# Patient Record
Sex: MALE | Race: WHITE | NOT HISPANIC OR LATINO | Employment: OTHER | URBAN - METROPOLITAN AREA
[De-identification: names, ages, dates, MRNs, and addresses within clinical notes are randomized per-mention and may not be internally consistent; named-entity substitution may affect disease eponyms.]

---

## 2017-11-24 ENCOUNTER — OFFICE VISIT - HEALTHEAST (OUTPATIENT)
Dept: FAMILY MEDICINE | Facility: CLINIC | Age: 72
End: 2017-11-24

## 2017-11-24 DIAGNOSIS — H66.91 RIGHT OTITIS MEDIA, UNSPECIFIED OTITIS MEDIA TYPE: ICD-10-CM

## 2017-11-24 DIAGNOSIS — J02.9 SORE THROAT: ICD-10-CM

## 2017-12-06 ENCOUNTER — RECORDS - HEALTHEAST (OUTPATIENT)
Dept: ADMINISTRATIVE | Facility: OTHER | Age: 72
End: 2017-12-06

## 2018-04-27 ENCOUNTER — OFFICE VISIT - HEALTHEAST (OUTPATIENT)
Dept: FAMILY MEDICINE | Facility: CLINIC | Age: 73
End: 2018-04-27

## 2018-04-27 DIAGNOSIS — K21.9 GASTROESOPHAGEAL REFLUX DISEASE WITHOUT ESOPHAGITIS: ICD-10-CM

## 2018-04-27 DIAGNOSIS — L98.9 NON-HEALING SKIN LESION: ICD-10-CM

## 2018-04-27 DIAGNOSIS — Z13.220 ENCOUNTER FOR SCREENING FOR LIPOID DISORDERS: ICD-10-CM

## 2018-04-27 DIAGNOSIS — Z13.6 SCREENING FOR CARDIOVASCULAR CONDITION: ICD-10-CM

## 2018-04-27 DIAGNOSIS — M10.9 GOUT: ICD-10-CM

## 2018-04-27 DIAGNOSIS — Z00.00 ROUTINE GENERAL MEDICAL EXAMINATION AT A HEALTH CARE FACILITY: ICD-10-CM

## 2018-04-27 DIAGNOSIS — Z12.5 SCREENING FOR MALIGNANT NEOPLASM OF PROSTATE: ICD-10-CM

## 2018-04-27 LAB
ALBUMIN SERPL-MCNC: 3.7 G/DL (ref 3.5–5)
ALP SERPL-CCNC: 84 U/L (ref 45–120)
ALT SERPL W P-5'-P-CCNC: 18 U/L (ref 0–45)
ANION GAP SERPL CALCULATED.3IONS-SCNC: 9 MMOL/L (ref 5–18)
AST SERPL W P-5'-P-CCNC: 24 U/L (ref 0–40)
BILIRUB SERPL-MCNC: 1 MG/DL (ref 0–1)
BUN SERPL-MCNC: 20 MG/DL (ref 8–28)
CALCIUM SERPL-MCNC: 9.8 MG/DL (ref 8.5–10.5)
CHLORIDE BLD-SCNC: 106 MMOL/L (ref 98–107)
CHOLEST SERPL-MCNC: 186 MG/DL
CO2 SERPL-SCNC: 25 MMOL/L (ref 22–31)
CREAT SERPL-MCNC: 1.22 MG/DL (ref 0.7–1.3)
FASTING STATUS PATIENT QL REPORTED: YES
GFR SERPL CREATININE-BSD FRML MDRD: 58 ML/MIN/1.73M2
GLUCOSE BLD-MCNC: 89 MG/DL (ref 70–125)
HDLC SERPL-MCNC: 73 MG/DL
LDLC SERPL CALC-MCNC: 100 MG/DL
POTASSIUM BLD-SCNC: 4.7 MMOL/L (ref 3.5–5)
PROT SERPL-MCNC: 6.5 G/DL (ref 6–8)
PSA SERPL-MCNC: 3.6 NG/ML (ref 0–6.5)
SODIUM SERPL-SCNC: 140 MMOL/L (ref 136–145)
TRIGL SERPL-MCNC: 66 MG/DL

## 2018-04-27 ASSESSMENT — MIFFLIN-ST. JEOR: SCORE: 1380.8

## 2018-05-01 ENCOUNTER — COMMUNICATION - HEALTHEAST (OUTPATIENT)
Dept: FAMILY MEDICINE | Facility: CLINIC | Age: 73
End: 2018-05-01

## 2018-05-01 ENCOUNTER — HOSPITAL ENCOUNTER (OUTPATIENT)
Dept: CT IMAGING | Facility: CLINIC | Age: 73
Discharge: HOME | End: 2018-05-01
Attending: FAMILY MEDICINE

## 2018-05-01 DIAGNOSIS — R93.89 ABNORMAL CT OF THE CHEST: ICD-10-CM

## 2018-05-01 DIAGNOSIS — Z13.6 SCREENING FOR CARDIOVASCULAR CONDITION: ICD-10-CM

## 2018-05-01 LAB
CV CALCIUM SCORE AGATSTON LM: 0
CV CALCIUM SCORING AGATSON LAD: 0
CV CALCIUM SCORING AGATSTON CX: 0
CV CALCIUM SCORING AGATSTON RCA: 0
CV CALCIUM SCORING AGATSTON TOTAL: 0

## 2018-05-02 ENCOUNTER — RECORDS - HEALTHEAST (OUTPATIENT)
Dept: ADMINISTRATIVE | Facility: OTHER | Age: 73
End: 2018-05-02

## 2018-05-03 ENCOUNTER — RECORDS - HEALTHEAST (OUTPATIENT)
Dept: ADMINISTRATIVE | Facility: OTHER | Age: 73
End: 2018-05-03

## 2018-05-07 ENCOUNTER — HOSPITAL ENCOUNTER (OUTPATIENT)
Dept: CT IMAGING | Facility: CLINIC | Age: 73
Discharge: HOME | End: 2018-05-07
Attending: FAMILY MEDICINE

## 2018-05-07 DIAGNOSIS — R93.89 ABNORMAL CT OF THE CHEST: ICD-10-CM

## 2018-05-08 ENCOUNTER — RECORDS - HEALTHEAST (OUTPATIENT)
Dept: ADMINISTRATIVE | Facility: OTHER | Age: 73
End: 2018-05-08

## 2018-06-05 ENCOUNTER — COMMUNICATION - HEALTHEAST (OUTPATIENT)
Dept: SCHEDULING | Facility: CLINIC | Age: 73
End: 2018-06-05

## 2018-06-05 ENCOUNTER — OFFICE VISIT - HEALTHEAST (OUTPATIENT)
Dept: FAMILY MEDICINE | Facility: CLINIC | Age: 73
End: 2018-06-05

## 2018-06-05 DIAGNOSIS — C44.311 BASAL CELL CARCINOMA OF ALA NASI: ICD-10-CM

## 2018-06-05 DIAGNOSIS — H92.21 EAR BLEEDING, RIGHT: ICD-10-CM

## 2018-06-06 ENCOUNTER — RECORDS - HEALTHEAST (OUTPATIENT)
Dept: ADMINISTRATIVE | Facility: OTHER | Age: 73
End: 2018-06-06

## 2019-02-11 ENCOUNTER — RECORDS - HEALTHEAST (OUTPATIENT)
Dept: GENERAL RADIOLOGY | Facility: CLINIC | Age: 74
End: 2019-02-11

## 2019-02-11 ENCOUNTER — OFFICE VISIT - HEALTHEAST (OUTPATIENT)
Dept: FAMILY MEDICINE | Facility: CLINIC | Age: 74
End: 2019-02-11

## 2019-02-11 DIAGNOSIS — M54.42 LUMBAGO WITH SCIATICA, LEFT SIDE: ICD-10-CM

## 2019-02-11 DIAGNOSIS — M54.42 LEFT-SIDED LOW BACK PAIN WITH LEFT-SIDED SCIATICA, UNSPECIFIED CHRONICITY: ICD-10-CM
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
## 2019-02-11 RX ORDER — OXYCODONE HYDROCHLORIDE 5 MG/1
5 TABLET ORAL
Qty: 13 TABLET | Refills: 0 | Status: SHIPPED | OUTPATIENT
Start: 2019-02-11 | End: 2021-09-10

## 2019-02-12 ENCOUNTER — HOSPITAL ENCOUNTER (OUTPATIENT)
Dept: MRI IMAGING | Facility: CLINIC | Age: 74
Discharge: HOME | End: 2019-02-12
Attending: FAMILY MEDICINE

## 2019-02-12 ENCOUNTER — AMBULATORY - HEALTHEAST (OUTPATIENT)
Dept: FAMILY MEDICINE | Facility: CLINIC | Age: 74
End: 2019-02-12

## 2019-02-12 ENCOUNTER — COMMUNICATION - HEALTHEAST (OUTPATIENT)
Dept: FAMILY MEDICINE | Facility: CLINIC | Age: 74
End: 2019-02-12

## 2019-02-12 DIAGNOSIS — M54.16 LUMBAR RADICULOPATHY, CHRONIC: ICD-10-CM

## 2019-02-12 DIAGNOSIS — M54.42 LEFT-SIDED LOW BACK PAIN WITH LEFT-SIDED SCIATICA, UNSPECIFIED CHRONICITY: ICD-10-CM

## 2019-02-12 DIAGNOSIS — M10.9 GOUT: ICD-10-CM

## 2019-02-13 ENCOUNTER — HOSPITAL ENCOUNTER (OUTPATIENT)
Dept: PHYSICAL MEDICINE AND REHAB | Facility: CLINIC | Age: 74
Discharge: HOME | End: 2019-02-13
Attending: PHYSICAL MEDICINE & REHABILITATION

## 2019-02-13 DIAGNOSIS — M54.16 LUMBAR RADICULITIS: ICD-10-CM

## 2019-02-13 DIAGNOSIS — M79.662 PAIN OF LEFT LOWER LEG: ICD-10-CM

## 2019-02-13 DIAGNOSIS — M54.50 ACUTE LEFT-SIDED LOW BACK PAIN WITHOUT SCIATICA: ICD-10-CM
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
## 2019-02-13 DIAGNOSIS — M41.26 OTHER IDIOPATHIC SCOLIOSIS, LUMBAR REGION: ICD-10-CM

## 2019-02-13 DIAGNOSIS — M53.3 SACROILIAC JOINT PAIN: ICD-10-CM

## 2019-02-13 ASSESSMENT — MIFFLIN-ST. JEOR: SCORE: 1366.29

## 2019-03-06 ENCOUNTER — COMMUNICATION - HEALTHEAST (OUTPATIENT)
Dept: PHYSICAL MEDICINE AND REHAB | Facility: CLINIC | Age: 74
End: 2019-03-06

## 2019-04-03 ENCOUNTER — RECORDS - HEALTHEAST (OUTPATIENT)
Dept: ADMINISTRATIVE | Facility: OTHER | Age: 74
End: 2019-04-03

## 2019-05-10 ENCOUNTER — COMMUNICATION - HEALTHEAST (OUTPATIENT)
Dept: FAMILY MEDICINE | Facility: CLINIC | Age: 74
End: 2019-05-10

## 2019-05-10 DIAGNOSIS — M10.9 GOUT: ICD-10-CM

## 2019-05-13 ENCOUNTER — COMMUNICATION - HEALTHEAST (OUTPATIENT)
Dept: FAMILY MEDICINE | Facility: CLINIC | Age: 74
End: 2019-05-13

## 2019-05-13 DIAGNOSIS — M10.9 GOUT: ICD-10-CM

## 2020-05-05 ENCOUNTER — RECORDS - HEALTHEAST (OUTPATIENT)
Dept: ADMINISTRATIVE | Facility: OTHER | Age: 75
End: 2020-05-05

## 2020-08-18 ENCOUNTER — OFFICE VISIT - HEALTHEAST (OUTPATIENT)
Dept: FAMILY MEDICINE | Facility: CLINIC | Age: 75
End: 2020-08-18

## 2020-08-18 DIAGNOSIS — N52.9 ERECTILE DYSFUNCTION, UNSPECIFIED ERECTILE DYSFUNCTION TYPE: ICD-10-CM

## 2020-08-18 DIAGNOSIS — Z13.220 ENCOUNTER FOR SCREENING FOR LIPOID DISORDERS: ICD-10-CM

## 2020-08-18 DIAGNOSIS — Z00.00 ROUTINE GENERAL MEDICAL EXAMINATION AT A HEALTH CARE FACILITY: ICD-10-CM

## 2020-08-18 DIAGNOSIS — Z23 TETANUS-DIPHTHERIA (TD) VACCINATION: ICD-10-CM

## 2020-08-18 DIAGNOSIS — M10.9 GOUT: ICD-10-CM

## 2020-08-18 DIAGNOSIS — Z12.5 SCREENING FOR MALIGNANT NEOPLASM OF PROSTATE: ICD-10-CM

## 2020-08-18 LAB
ALBUMIN SERPL-MCNC: 4 G/DL (ref 3.5–5)
ALP SERPL-CCNC: 73 U/L (ref 45–120)
ALT SERPL W P-5'-P-CCNC: 19 U/L (ref 0–45)
ANION GAP SERPL CALCULATED.3IONS-SCNC: 10 MMOL/L (ref 5–18)
AST SERPL W P-5'-P-CCNC: 32 U/L (ref 0–40)
BILIRUB SERPL-MCNC: 1 MG/DL (ref 0–1)
BUN SERPL-MCNC: 26 MG/DL (ref 8–28)
CALCIUM SERPL-MCNC: 9.2 MG/DL (ref 8.5–10.5)
CHLORIDE BLD-SCNC: 107 MMOL/L (ref 98–107)
CHOLEST SERPL-MCNC: 177 MG/DL
CO2 SERPL-SCNC: 23 MMOL/L (ref 22–31)
CREAT SERPL-MCNC: 1.2 MG/DL (ref 0.7–1.3)
FASTING STATUS PATIENT QL REPORTED: YES
GFR SERPL CREATININE-BSD FRML MDRD: 59 ML/MIN/1.73M2
GLUCOSE BLD-MCNC: 83 MG/DL (ref 70–125)
HDLC SERPL-MCNC: 69 MG/DL
LDLC SERPL CALC-MCNC: 95 MG/DL
POTASSIUM BLD-SCNC: 4.7 MMOL/L (ref 3.5–5)
PROT SERPL-MCNC: 6.5 G/DL (ref 6–8)
PSA SERPL-MCNC: 4.7 NG/ML (ref 0–6.5)
SODIUM SERPL-SCNC: 140 MMOL/L (ref 136–145)
TRIGL SERPL-MCNC: 65 MG/DL

## 2020-08-18 RX ORDER — ALLOPURINOL 100 MG/1
100 TABLET ORAL DAILY
Qty: 90 TABLET | Refills: 3 | Status: SHIPPED | OUTPATIENT
Start: 2020-08-18 | End: 2021-09-10

## 2020-08-18 RX ORDER — TADALAFIL 10 MG/1
10 TABLET ORAL DAILY PRN
Qty: 30 TABLET | Refills: 0 | Status: SHIPPED | OUTPATIENT
Start: 2020-08-18 | End: 2021-09-10

## 2020-08-18 ASSESSMENT — MIFFLIN-ST. JEOR: SCORE: 1373.55

## 2020-09-01 ENCOUNTER — COMMUNICATION - HEALTHEAST (OUTPATIENT)
Dept: FAMILY MEDICINE | Facility: CLINIC | Age: 75
End: 2020-09-01

## 2020-09-01 DIAGNOSIS — M71.9 DISORDER OF BURSAE AND TENDONS IN SHOULDER REGION: ICD-10-CM

## 2020-09-01 DIAGNOSIS — M67.919 DISORDER OF BURSAE AND TENDONS IN SHOULDER REGION: ICD-10-CM

## 2020-09-21 ENCOUNTER — RECORDS - HEALTHEAST (OUTPATIENT)
Dept: ADMINISTRATIVE | Facility: OTHER | Age: 75
End: 2020-09-21

## 2020-11-02 ENCOUNTER — RECORDS - HEALTHEAST (OUTPATIENT)
Dept: ADMINISTRATIVE | Facility: OTHER | Age: 75
End: 2020-11-02

## 2020-11-29 ENCOUNTER — COMMUNICATION - HEALTHEAST (OUTPATIENT)
Dept: FAMILY MEDICINE | Facility: CLINIC | Age: 75
End: 2020-11-29

## 2021-01-20 ENCOUNTER — RECORDS - HEALTHEAST (OUTPATIENT)
Dept: ADMINISTRATIVE | Facility: OTHER | Age: 76
End: 2021-01-20

## 2021-01-21 ENCOUNTER — COMMUNICATION - HEALTHEAST (OUTPATIENT)
Dept: FAMILY MEDICINE | Facility: CLINIC | Age: 76
End: 2021-01-21

## 2021-02-12 ENCOUNTER — AMBULATORY - HEALTHEAST (OUTPATIENT)
Dept: NURSING | Facility: CLINIC | Age: 76
End: 2021-02-12

## 2021-02-16 ENCOUNTER — COMMUNICATION - HEALTHEAST (OUTPATIENT)
Dept: FAMILY MEDICINE | Facility: CLINIC | Age: 76
End: 2021-02-16

## 2021-03-05 ENCOUNTER — AMBULATORY - HEALTHEAST (OUTPATIENT)
Dept: NURSING | Facility: CLINIC | Age: 76
End: 2021-03-05

## 2021-03-31 ENCOUNTER — AMBULATORY - HEALTHEAST (OUTPATIENT)
Dept: FAMILY MEDICINE | Facility: CLINIC | Age: 76
End: 2021-03-31

## 2021-03-31 DIAGNOSIS — Z23 NEED FOR PNEUMOCOCCAL VACCINATION: ICD-10-CM

## 2021-04-01 ENCOUNTER — AMBULATORY - HEALTHEAST (OUTPATIENT)
Dept: NURSING | Facility: CLINIC | Age: 76
End: 2021-04-01

## 2021-04-01 DIAGNOSIS — Z23 NEED FOR PNEUMOCOCCAL VACCINATION: ICD-10-CM

## 2021-05-26 NOTE — TELEPHONE ENCOUNTER
"Patient returned call. Reports he is doing well. \"I am doing the stretches and exercises for the SI and I am back to normal. Thank her for me. She is the one that figured out it wasn't my back, but the SI joint.\"   "

## 2021-05-27 ENCOUNTER — RECORDS - HEALTHEAST (OUTPATIENT)
Dept: ADMINISTRATIVE | Facility: CLINIC | Age: 76
End: 2021-05-27

## 2021-05-28 NOTE — TELEPHONE ENCOUNTER
RN Med Refill note:    Resent to Rx to new pharmacy [facility] per pt request.    Thea Mancia RN   [facility] RN Triage

## 2021-05-28 NOTE — TELEPHONE ENCOUNTER
Refill Request  Did you contact pharmacy: Yes  Medication name:   Requested Prescriptions     Pending Prescriptions Disp Refills     allopurinol (ZYLOPRIM) 100 MG tablet 90 tablet 3     Sig: Take 1 tablet (100 mg total) by mouth daily.     Who prescribed the medication: Dr Forbes        Pharmacy Name and Location: Changing pharmacy to [facility]    Okay to leave a detailed message: yes

## 2021-05-28 NOTE — TELEPHONE ENCOUNTER
RN cannot approve Refill Request    RN can NOT refill this medication PCP messaged that patient is overdue for Labs.       Elizabeth Borges, [facility] Triage/Med Refill 5/10/2019    Requested Prescriptions   Pending Prescriptions Disp Refills     allopurinol (ZYLOPRIM) 100 MG tablet 90 tablet 0     Sig: Take 1 tablet (100 mg total) by mouth daily.       Allopurinol/Febuxostat Refill Protocol  Failed - 5/10/2019  1:15 PM        Failed - LFT or AST or ALT in last 12 months     Albumin   Date Value Ref Range Status   04/27/2018 3.7 3.5 - 5.0 g/dL Final     Bilirubin, Total   Date Value Ref Range Status   04/27/2018 1.0 0.0 - 1.0 mg/dL Final     Alkaline Phosphatase   Date Value Ref Range Status   04/27/2018 84 45 - 120 U/L Final     AST   Date Value Ref Range Status   04/27/2018 24 0 - 40 U/L Final     ALT   Date Value Ref Range Status   04/27/2018 18 0 - 45 U/L Final     Protein, Total   Date Value Ref Range Status   04/27/2018 6.5 6.0 - 8.0 g/dL Final                Passed - Visit with PCP or prescribing provider visit in past 12 months     Last office visit with prescriber/PCP: 2/11/2019 Joaquín Forbes MD OR same dept: 2/11/2019 Joaquín Forbes MD OR same specialty: 2/11/2019 Joaquín Forbes MD  Last physical: 4/27/2018 Last MTM visit: Visit date not found   Next visit within 3 mo: Visit date not found  Next physical within 3 mo: Visit date not found  Prescriber OR PCP: Joaquín Forbes MD  Last diagnosis associated with med order: 1. Gout  - allopurinol (ZYLOPRIM) 100 MG tablet; Take 1 tablet (100 mg total) by mouth daily.  Dispense: 90 tablet; Refill: 0    If protocol passes may refill for 12 months if within 3 months of last provider visit (or a total of 15 months).

## 2021-05-29 ENCOUNTER — RECORDS - HEALTHEAST (OUTPATIENT)
Dept: ADMINISTRATIVE | Facility: CLINIC | Age: 76
End: 2021-05-29

## 2021-05-30 ENCOUNTER — HEALTH MAINTENANCE LETTER (OUTPATIENT)
Age: 76
End: 2021-05-30

## 2021-05-30 ENCOUNTER — RECORDS - HEALTHEAST (OUTPATIENT)
Dept: ADMINISTRATIVE | Facility: CLINIC | Age: 76
End: 2021-05-30

## 2021-05-31 VITALS — BODY MASS INDEX: 25.77 KG/M2 | WEIGHT: 160.9 LBS

## 2021-06-01 VITALS — WEIGHT: 157.2 LBS | BODY MASS INDEX: 25.26 KG/M2 | HEIGHT: 66 IN

## 2021-06-01 VITALS — WEIGHT: 150.4 LBS | BODY MASS INDEX: 24.28 KG/M2

## 2021-06-02 VITALS — BODY MASS INDEX: 25.69 KG/M2 | WEIGHT: 154 LBS | WEIGHT: 159.19 LBS | HEIGHT: 66 IN | BODY MASS INDEX: 24.75 KG/M2

## 2021-06-04 VITALS — BODY MASS INDEX: 25.01 KG/M2 | OXYGEN SATURATION: 99 % | WEIGHT: 155.6 LBS | HEART RATE: 62 BPM | HEIGHT: 66 IN

## 2021-06-10 NOTE — PATIENT INSTRUCTIONS - HE
Patient Education   Personalized Prevention Plan  You are due for the preventive services outlined below.  Your care team is available to assist you in scheduling these services.  If you have already completed any of these items, please share that information with your care team to update in your medical record.  Health Maintenance   Topic Date Due     ZOSTER VACCINES (2 of 3) 01/26/2016     PNEUMOCOCCAL IMMUNIZATION 65+ LOW/MEDIUM RISK (2 of 2 - PPSV23) 10/06/2016     TD 18+ HE  03/05/2018     MEDICARE ANNUAL WELLNESS VISIT  04/27/2019     FALL RISK ASSESSMENT  04/27/2019     INFLUENZA VACCINE RULE BASED (1) 08/01/2020     LIPID  04/27/2023     ADVANCE CARE PLANNING  04/27/2023     HEPATITIS B VACCINES  Aged Out

## 2021-06-10 NOTE — PROGRESS NOTES
Assessment and Plan:       1. Routine general medical examination at a health care facility  - Comprehensive Metabolic Panel    2. Encounter for screening for lipoid disorders  - Lipid Cascade, RANDOM    3. Screening for malignant neoplasm of prostate  - PSA (Prostatic-Specific Antigen), Annual Screen    4. Tetanus-diphtheria (Td) vaccination  - Td, Preservative Free (green label)    5. Erectile dysfunction, unspecified erectile dysfunction type  - tadalafiL (CIALIS) 10 MG tablet; Take 1 tablet (10 mg total) by mouth daily as needed for erectile dysfunction.  Dispense: 30 tablet; Refill: 0    6. Gout  - allopurinoL (ZYLOPRIM) 100 MG tablet; Take 1 tablet (100 mg total) by mouth daily.  Dispense: 90 tablet; Refill: 3  He continues to be quite active and doing well. Does not really have any major concerns today except for need for medications for rectal dysfunction and he did get the prescriptions.  He does not have any cardiovascular disease problems.  We will also update his tetanus shot and get his labs.  We will also refill his allopurinol.  Have him follow-up as needed and if he has any major concerns.  The patient's current medical problems were reviewed.    I have had an Advance Directives discussion with the patient.  The following health maintenance schedule was reviewed with the patient and provided in printed form in the after visit summary:   Health Maintenance   Topic Date Due     ZOSTER VACCINES (2 of 3) 01/26/2016     PNEUMOCOCCAL IMMUNIZATION 65+ LOW/MEDIUM RISK (2 of 2 - PPSV23) 10/06/2016     TD 18+ HE  03/05/2018     MEDICARE ANNUAL WELLNESS VISIT  04/27/2019     FALL RISK ASSESSMENT  04/27/2019     INFLUENZA VACCINE RULE BASED (1) 08/01/2020     LIPID  04/27/2023     ADVANCE CARE PLANNING  04/27/2023     HEPATITIS B VACCINES  Aged Out        Subjective:   Chief Complaint: Rasheed Hanson is an 75 y.o. male here for an Annual Wellness visit.   HPI: 75 years old gentleman who comes in today for  his annual wellness visit.  He has noted that he has remained quite well and remained active.  He is feeling well and going about his normal activities.  He comes in fasted and will like to have his labs done.  He does request for erectile dysfunction medications noted that he has been having a little bit of difficulty having any erection.  He does not have any cardiovascular disease problems.  He also wants to have a refill of his allopurinol.  He noted that whenever he does not take the allopurinol he will be having some issues with gout.  Noted that he has not had gout recently though.    Review of Systems:    Constitutional:Denied any fatigue no fevers no chills.  Has good appetite.  HEENT: Does not have any neck pain.  No difficulty swallowing denies having any postnasal drips.    Respiratory: There is no cough.  No chest wall pain.  Cardiovascular: Denied chest pain shortness of breath or palpitations.  There is no notable lower extremity swelling.    Gastrointestinal: Denies nausea vomiting.  No abdominal pain no diarrhea or constipation.  Endocrine:Has no sensitivity to cold or heat.  Denied undue thirst.   Genitourinary:Has no urinary symptoms, no nocturia.  Musculoskeletal: There is no musculoskeletal pain and swelling.    Skin: He does not have any rashes.   Allergic/Immunologic: Negative.   Neurological: No Numbness  Hematological: Negative.   Psychiatric/Behavioral: No anxiety or depression symptoms.  Please see above.  The rest of the review of systems are negative for all systems.    Patient Care Team:  Joaquín Forbes MD as PCP - General (Family Medicine)  Joaquín Forbes MD as Assigned PCP     Patient Active Problem List   Diagnosis     Acute Gout     Bronchitis     Sinusitis     Hyperlipidemia     Cervical Spondylosis (C5 - C6)     Left Rotator Cuff Tendonitis     Onychomycosis Of The Toenails     Tinea Pedis     Otitis Externa     Lumbar radiculopathy, chronic      Tendonitis     Past Medical History:   Diagnosis Date     Acute Gout     Created by Conversion      Bronchitis     Created by Conversion      Hyperlipidemia     Created by Conversion      Lumbar Radiculopathy     Created by Conversion      Otitis Externa     Created by Conversion  Replacement Utility updated for latest IMO load     Tendonitis     Created by Conversion  Replacement Utility updated for latest IMO load     Tinea Pedis     Created by Conversion       Past Surgical History:   Procedure Laterality Date     LASIK       ROTATOR CUFF REPAIR Right       Family History   Problem Relation Age of Onset     Cancer Mother         bladder     Hypertension Mother      Prostate cancer Maternal Grandfather      Cancer Father         Brain Cancer     Alcohol abuse Brother          oF MVA     Kidney disease Brother      Arthritis Brother         Back problems      Social History     Socioeconomic History     Marital status:      Spouse name: Not on file     Number of children: Not on file     Years of education: Not on file     Highest education level: Not on file   Occupational History     Not on file   Social Needs     Financial resource strain: Not on file     Food insecurity     Worry: Not on file     Inability: Not on file     Transportation needs     Medical: Not on file     Non-medical: Not on file   Tobacco Use     Smoking status: Never Smoker     Smokeless tobacco: Never Used   Substance and Sexual Activity     Alcohol use: Yes     Alcohol/week: 14.0 standard drinks     Types: 14 Glasses of wine per week     Drug use: No     Sexual activity: Yes     Partners: Female   Lifestyle     Physical activity     Days per week: Not on file     Minutes per session: Not on file     Stress: Not on file   Relationships     Social connections     Talks on phone: Not on file     Gets together: Not on file     Attends Jew service: Not on file     Active member of club or organization: Not on file     Attends  "meetings of clubs or organizations: Not on file     Relationship status: Not on file     Intimate partner violence     Fear of current or ex partner: Not on file     Emotionally abused: Not on file     Physically abused: Not on file     Forced sexual activity: Not on file   Other Topics Concern     Not on file   Social History Narrative     Not on file      Current Outpatient Medications   Medication Sig Dispense Refill     allopurinol (ZYLOPRIM) 100 MG tablet Take 1 tablet (100 mg total) by mouth daily. 90 tablet 3     aspirin (ASPIR-81) 81 MG EC tablet Take 1/2 tablet daily       DOCOSAHEXANOIC ACID/EPA (FISH OIL ORAL) Take as directed       GLUCOSAM HCL/CHONDRO FRIEDMAN A/C/MN (GLUCOSAMINE-CHONDROITIN COMPLX ORAL) Take 1 tablet daily       lactobacillus acidophilus (ACIDOPHILUS) cap Take as directed.       MELATONIN ORAL Take by mouth. As directed       multivitamin (ONE A DAY) per tablet Take 1 tablet by mouth daily.       psyllium (METAMUCIL SUGAR FREE) Powd Take 6 g by mouth daily.  0     SAW PALMETTO ORAL Take by mouth.       zinc 50 mg Tab Take as directed       clotrimazole (LOTRIMIN) 1 % cream Apply topically 2 (two) times a day. Until rash is resolved       diclofenac sodium (VOLTAREN) 1 % Gel Apply to lower extremities, 4 GM of gel to affected area 4 times daily. Do not apply more than 16 GM daily to any one affected joint. 1 Tube 3     oxyCODONE (ROXICODONE) 5 MG immediate release tablet Take 1 tablet (5 mg total) by mouth at bedtime as needed for pain. 13 tablet 0     No current facility-administered medications for this visit.       Objective:   Vital Signs:   Visit Vitals  Pulse 62   Ht 5' 6\" (1.676 m)   Wt 155 lb 9.6 oz (70.6 kg)   SpO2 99%   BMI 25.11 kg/m           VisionScreening:  No exam data present     Physical Exam:  General Appearance: Alert, cooperative, no distress, appears stated age  Head: Normocephalic, without obvious abnormality, atraumatic  Eyes: PERRL, conjunctiva/corneas clear, EOM's " intact  Ears: Normal TM's and external ear canals, both ears  Nose: Nares normal, septum midline,mucosa normal, no drainage  Throat: Lips, mucosa, and tongue normal; teeth and gums normal  Neck: Supple, symmetrical, trachea midline, no adenopathy;  thyroid: not enlarged, symmetric, no tenderness.  Back: Symmetric, no curvature, ROM normal, no CVA tenderness  Lungs: Clear to auscultation bilaterally, respirations unlabored  Heart: Regular rate and rhythm, S1 and S2 normal, no murmur, rub, or gallop,  Abdomen: Soft, non-tender, bowel sounds active all four quadrants,  no masses, no organomegaly  Musculoskeletal: Normal range of motion. No joint swelling or deformity.   Extremities: Extremities normal, atraumatic, no cyanosis or edema  Skin: Skin color, texture, turgor normal, no rashes or lesions  Lymph nodes: Cervical, supraclavicular, and axillary nodes normal  Neurologic: He is alert. He has normal reflexes.   Psychiatric: He has a normal mood and affect.       Assessment Results 8/18/2020   Activities of Daily Living No help needed   Instrumental Activities of Daily Living No help needed   Get Up and Go Score -   Mini Cog Total Score 5   Some recent data might be hidden     A Mini-Cog score of 0-2 suggests the possibility of dementia, score of 3-5 suggests no dementia  He does not have any problems with fall and no memory related issues.      Identified Health Risks:

## 2021-06-11 NOTE — TELEPHONE ENCOUNTER
Orders being requested: physical therapy  Reason service is needed/diagnosis: left shoulder soreness that is not injury related  When are orders needed by: non-urgent  Where to send Orders: Fax: [facility] in [place] fax [phone]  Okay to leave detailed message?  No    Patient stated he spoke to Joaquín Forbes MD about this at his recent physical. Patient stated he has been helping his kids doing home improvement projects and he thinks he overworked his left shoulder. Patient stated it's been sore for the last 2 weeks and is not better. Patient stated he's had surgery on the left shoulder before and would like physical therapy to see if it helps him.

## 2021-06-14 NOTE — PROGRESS NOTES
Assessment/Plan:     1. Right otitis media, unspecified otitis media type  The right ear canal was flushed per provider assist. Patient tolerated well and small amount of bloody drainage was removed.  Azithromycin x 5 days.  I did choose to do some otic drops due to the amount of canal swelling/erythema.    - ofloxacin (FLOXIN) 0.3 % otic solution; Administer 5 drops to the right ear 2 (two) times a day for 7 days.  Dispense: 5 mL; Refill: 0  - azithromycin (ZITHROMAX Z-ISAIAS) 250 MG tablet; Take 2 tablets (500 mg) on  Day 1,  followed by 1 tablet (250 mg) once daily on Days 2 through 5.  Dispense: 6 tablet; Refill: 0    2. Sore throat  Rapid strep negative.  Will notify if culture positive.   - Rapid Strep A Screen-Throat  - Group A Strep, RNA Direct Detection, Throat        Subjective:     Rasheed Hanson is a 72 y.o. male who presents with right ear pain and sore throat.  Ear pain started about 1 week ago while hunting.  Patient believes he got some bark stuck in his ear and has been attempting to remove it throughout the week.  He noticed some bloody drainage from the ear today.  Sore throat started 3 days ago; painful to swallow on right side.  No known strep contacts.  No fever, sinus congestion, runny nose, or cough.  Eating and drinking without problem.  OTC treatments include fluids and Ibuprofen.       The following portions of the patient's history were reviewed and updated as appropriate: allergies, current medications, past family history, past medical history, past social history, past surgical history and problem list.    Review of Systems  Pertinent items are noted in HPI.     Objective:     /68 (Patient Site: Right Arm, Patient Position: Sitting, Cuff Size: Adult Regular)  Pulse 68  Temp 97.9  F (36.6  C) (Oral)   Wt 160 lb 14.4 oz (73 kg)  BMI 25.77 kg/m2    General Appearance: Alert, cooperative, no distress, appears stated age  Ears: Right TM erythematous and bulging; canal swollen and  erythematous. Small amounts of bloody drainage. Left TM and canal normal.   Throat: Mild erythema. No tonsillar hypertrophy or exudate  Neck: Supple, symmetrical, trachea midline, no adenopathy  Lungs: Clear to auscultation bilaterally, respirations unlabored  Heart: Regular rate and rhythm, S1 and S2 normal, no murmur, rub, or gallop     Recent Results (from the past 240 hour(s))   Rapid Strep A Screen-Throat    Collection Time: 11/24/17 12:06 PM   Result Value Ref Range    Rapid Strep A Antigen No Group A Strep detected, presumptive negative No Group A Strep detected, presumptive negative         Sindy Garcia NP-C

## 2021-06-17 NOTE — PROGRESS NOTES
Assessment and Plan:     1. Routine general medical examination at a health care facility  Reviewed his last labs and will compare with current lab ordered today.Advised to continue to be physically as he has already been.  - Comprehensive Metabolic Panel  - [facility] LAV    2. Gout  - allopurinol (ZYLOPRIM) 100 MG tablet; Take 1 tablet (100 mg total) by mouth daily.  Dispense: 90 tablet; Refill: 2    3. Encounter for screening for lipoid disorders  - Lipid [place], FASTING; Future  - Lipid [place], FASTING    4. Screening for malignant neoplasm of prostate  - PSA (Prostatic-Specific Antigen), Annual Screen    5. Gastroesophageal reflux disease without esophagitis  - Ambulatory referral to Gastroenterology  Has had a lot of reflux in the past.Currently just using some TUMS intermittently. Advised getting an EGD  To make sure there is no permanent damage.  6. Non-healing skin lesion left Ear.  - Ambulatory referral to Dermatology  Referred to the dermatologist to evaluate the non healing scab on the ear.  7. Screening for cardiovascular condition  - CT Cardiac Calcium Score; Future  Has good Cholesterol and not on medication. Wants to have further risk stratification with  CT Calcium score.    The patient's current medical problems were reviewed as above..    I have had an Advance Directives discussion with the patient.  The following high BMI interventions were performed this visit: encouragement to exercise, weight monitoring and prescribed dietary intake  The following health maintenance schedule was reviewed with the patient and provided in printed form in the after visit summary:   Health Maintenance   Topic Date Due     INFLUENZA VACCINE RULE BASED (1) 08/01/2017     FALL RISK ASSESSMENT  08/24/2017     TD 18+ HE  03/05/2018     ADVANCE DIRECTIVES DISCUSSED WITH PATIENT  12/05/2021     COLONOSCOPY  10/13/2025     PNEUMOCOCCAL POLYSACCHARIDE VACCINE AGE 65 AND OVER  Completed     PNEUMOCOCCAL CONJUGATE VACCINE FOR  ADULTS (PCV13 OR PREVNAR)  Completed     ZOSTER VACCINE  Completed        Subjective:   Chief Complaint: Rasheed Hanson is an 72 y.o. male here for an Annual Wellness visit.   HPI:  Comes in for Annual wellness visit. Noted some abdominal discomfort at the epigastric region.Happens intermittently and sometimes he may need to take some TUMS.Has had to take PPI in the past,but has stopped.Sleeps with the bed propped up.  Had a small flaky area on the ear that he has had for several months. It is still scabbing and not healed well.   He will like to have his Cardiac risk discussed. He does exercise,and previous cholesterol has been normal. Has a moderate risk.Will want to have CT calcium score to help.     Review of Systems:      Review of Systems   Constitutional:Denied any fatigue no fevers no chills.  Has good appetite.  HEENT: Does not have any neck pain.  No difficulty swallowing denies having any postnasal drips.    Respiratory: There is no cough.  No chest wall pain.  Cardiovascular: Denied chest pain shortness of breath or palpitations.  There is no notable lower extremity swelling.    Gastrointestinal: Denies nausea vomiting.  No abdominal pain no diarrhea or constipation.otherwise as in history.  Endocrine:Has no sensitivity to cold or heat.  Denied undue thirst.   Genitourinary:Has no urinary symptoms, no nocturia.  Musculoskeletal: There is no musculoskeletal pain and swelling.    Skin: He does not have any rash otherwise as in history.   Allergic/Immunologic: Negative.   Neurological: No Numbness  Hematological: Negative.   Psychiatric/Behavioral: No anxiety or depression symptoms.    Please see above.  The rest of the review of systems are negative for all systems.    Patient Care Team:  Joaquín Forbes MD as PCP - General (Family Medicine)     Patient Active Problem List   Diagnosis     Acute Gout     Bronchitis     Sinusitis     Hyperlipidemia     Cervical Spondylosis (C5 - C6)     Left  Rotator Cuff Tendonitis     Onychomycosis Of The Toenails     Tinea Pedis     Otitis Externa     Lumbar radiculopathy, chronic     Tendonitis     Past Medical History:   Diagnosis Date     Acute Gout     Created by Conversion      Bronchitis     Created by Conversion      Hyperlipidemia     Created by Conversion      Lumbar Radiculopathy     Created by Conversion      Otitis Externa     Created by Conversion  Replacement Utility updated for latest IMO load     Tendonitis     Created by Conversion  Replacement Utility updated for latest IMO load     Tinea Pedis     Created by Conversion       Past Surgical History:   Procedure Laterality Date     LASIK       ROTATOR CUFF REPAIR Right       Family History   Problem Relation Age of Onset     Cancer Mother      bladder     Hypertension Mother      Prostate cancer Maternal Grandfather      Cancer Father      Brain Cancer     Alcohol abuse Brother       oF MVA     Kidney disease Brother      Arthritis Brother      Back problems      Social History     Social History     Marital status:      Spouse name: N/A     Number of children: N/A     Years of education: N/A     Occupational History     Not on file.     Social History Main Topics     Smoking status: Never Smoker     Smokeless tobacco: Never Used     Alcohol use 8.4 oz/week     14 Glasses of wine per week     Drug use: No     Sexual activity: Yes     Partners: Female     Other Topics Concern     Not on file     Social History Narrative      Current Outpatient Prescriptions   Medication Sig Dispense Refill     allopurinol (ZYLOPRIM) 100 MG tablet TAKE 1 TABLET EVERY DAY 90 tablet 2     aspirin (ASPIR-81) 81 MG EC tablet Take 1/2 tablet daily       clotrimazole (LOTRIMIN) 1 % cream Apply topically 2 (two) times a day. Until rash is resolved       diclofenac sodium (VOLTAREN) 1 % Gel Apply to lower extremities, 4 GM of gel to affected area 4 times daily. Do not apply more than 16 GM daily to any one affected  "joint. 1 Tube 3     DOCOSAHEXANOIC ACID/EPA (FISH OIL ORAL) Take as directed       GLUCOSAM HCL/CHONDRO FRIEDMAN A/C/MN (GLUCOSAMINE-CHONDROITIN COMPLX ORAL) Take 1 tablet daily       lactobacillus acidophilus (ACIDOPHILUS) cap Take as directed.       MELATONIN ORAL Take by mouth. As directed       multivitamin (ONE A DAY) per tablet Take 1 tablet by mouth daily.       psyllium (METAMUCIL SUGAR FREE) Powd Take 6 g by mouth daily.  0     SAW PALMETTO ORAL Take by mouth.       zinc 50 mg Tab Take as directed       diclofenac sodium (VOLTAREN) 1 % Gel Apply 1 application topically 3 (three) times a day. 1 Tube 0     hydrOXYzine (VISTARIL) 25 MG capsule        ibuprofen (ADVIL,MOTRIN) 600 MG tablet Take as directed       oxyCODONE (ROXICODONE) 5 MG immediate release tablet        No current facility-administered medications for this visit.       Objective:   Vital Signs:   Visit Vitals     /68 (Patient Site: Left Arm, Patient Position: Sitting, Cuff Size: Adult Regular)     Pulse (!) 57     Ht 5' 6\" (1.676 m)     Wt 157 lb 3.2 oz (71.3 kg)     BMI 25.37 kg/m2         PHYSICAL EXAM  General Appearance: Alert, cooperative, no distress, appears stated age  Head: Normocephalic, without obvious abnormality, atraumatic  Eyes: PERRL, conjunctiva/corneas clear, EOM's intact  Ears: Normal TM's and external ear canals, both ears.Left sided external ear scaby area. No tenderness.  Nose: Nares normal, septum midline,mucosa normal, no drainage  Throat: Lips, mucosa, and tongue normal; teeth and gums normal  Neck: Supple, symmetrical, trachea midline, no adenopathy;  thyroid: not enlarged, symmetric, no tenderness/mass/nodules; no carotid bruit or JVD  Back: Symmetric, no curvature, ROM normal, no CVA tenderness  Lungs: Clear to auscultation bilaterally, respirations unlabored  Heart: Regular rate and rhythm, S1 and S2 normal, no murmur, rub, or gallop,  Abdomen: Soft, non-tender, bowel sounds active all four quadrants,  no masses, " no organomegaly  Musculoskeletal: Normal range of motion. No joint swelling or deformity.   Extremities: Extremities normal, atraumatic, no cyanosis or edema  Skin: Skin color, texture, turgor normal, no rashes or lesions  Lymph nodes: Cervical, supraclavicular, and axillary nodes normal  Neurologic: He is alert. He has normal reflexes.   Psychiatric: He has a normal mood and affect.       Assessment Results 4/27/2018   Activities of Daily Living No help needed   Instrumental Activities of Daily Living No help needed   Get Up and Go Score Less than 12 seconds   Mini Cog Total Score 5   Some recent data might be hidden     A Mini-Cog score of 0-2 suggests the possibility of dementia, score of 3-5 suggests no dementia    Identified Health Risks:     He is at risk for falling and has been provided with information to reduce the risk of falling at home.Counseled about Physical activities and continue to be active.  Information regarding advance directives (living [place]), including where he can download the appropriate form, was provided to the patient via the AVS.

## 2021-06-18 NOTE — PROGRESS NOTES
Assessment/Plan:        Diagnoses and all orders for this visit:    1. Ear bleeding, right  ofloxacin (FLOXIN) 0.3 % otic solution   2. Basal cell carcinoma of ala nasi         Other orders  -     omeprazole (PRILOSEC) 20 MG capsule;   He also has been feeling as if he is having some sore throat that has started this afternoon.  But it does not bother him as much.  I think that he may have had a possible tympanic membrane perforation though I did not see any opening.  Going to have him started on ofloxacin drops to hopefully prevent any infections.  I have advised him to call to let me know if he continues to have the sore throat and drainage.  I have recently referred him to see the dermatologist because of a lesion in his left external ear.  He was noted to have had basal cell carcinoma of the left side of the ala nasi.  He is going to have Mohs surgery tomorrow and he will let me know if he has any needs.    Subjective:    Patient ID: Rasheed Hanson is a 73 y.o. male.    Ear Drainage    There is pain in the right (He noted sudden onset of discharge that he described as bloody from the right ear.  He did not have any pain to the ear but felt as if there is something within the ear.  He did have a past history of infection in November.) ear. This is a new problem. The current episode started in the past 7 days. The problem has been waxing and waning. There has been no fever. The patient is experiencing no pain. Associated symptoms include ear discharge. Pertinent negatives include no headaches, hearing loss, rhinorrhea or sore throat. Associated symptoms comments: Had an episode of a discharge which was bloody.. He has tried nothing for the symptoms.       The following portions of the patient's history were reviewed and updated as appropriate: allergies, current medications, past family history, past medical history, past social history, past surgical history and problem list.    Review of Systems    Constitutional: Negative for fatigue and fever.   HENT: Positive for ear discharge. Negative for congestion, ear pain, hearing loss, rhinorrhea, sinus pain, sinus pressure and sore throat.    Respiratory: Negative.    Allergic/Immunologic: Negative for environmental allergies.   Neurological: Negative for light-headedness and headaches.     Vitals:    06/05/18 1558   BP: 120/60   Patient Site: Right Arm   Patient Position: Sitting   Cuff Size: Adult Regular   Pulse: 66   SpO2: 98%   Weight: 150 lb 6.4 oz (68.2 kg)             Objective:    Physical Exam   Constitutional: He appears well-developed.   HENT:   Left Ear: Tympanic membrane and ear canal normal.   Nose: Right sinus exhibits no maxillary sinus tenderness and no frontal sinus tenderness. Left sinus exhibits no maxillary sinus tenderness and no frontal sinus tenderness.   Mouth/Throat: Oropharynx is clear and moist.   Right external ear that show some dried blood, there is also noted dried blood on the ear canal of the right side.  There appears to be small bleeding coming out from the lower pole of the tympanic membrane.  Tympanic membrane does look like it is partly retracted.   Neck: Normal range of motion.   Cardiovascular: Normal rate and regular rhythm.    Pulmonary/Chest: Effort normal and breath sounds normal.   Neurological: He is alert.

## 2021-06-23 NOTE — PROGRESS NOTES
Assessment/Plan:        Diagnoses and all orders for this visit:    Fall due to slipping on ice or snow, initial encounter    Left-sided low back pain with left-sided sciatica, unspecified chronicity  -     MR Lumbar Spine Without Contrast; Future; Expected date: 02/11/2019  -     XR Lumbar Spine 2 or 3 VWS; Future; Expected date: 02/11/2019  -     oxyCODONE (ROXICODONE) 5 MG immediate release tablet; Take 1 tablet (5 mg total) by mouth at bedtime as needed for pain.  Dispense: 13 tablet; Refill: 0  I did order for an x-ray and we awaiting radiologist read on the x-ray.  I do think that because of the prior surgery as well as lumbar degeneration, and the fact that the pain is a severe it will be reasonable to also order for an MRI.  We will get those and patient will most likely be seen in the spine care physicians depending on the report.  We will also start him on pain medication while he continues with the Kinesiotape.        Subjective:    Patient ID: Rasheed Hanson is a 73 y.o. male.    He has a history of chronic lumbar radiculopathy is a status post lumbar disc surgery on the right side.  He had a fall at home on the ice and since then has been having pain noted on the left lower back with radiation down to the legs and buttocks.  The worry today is because of the radiation of the pain down to the left lower extremity as well as continuing pain for about a month now.  He has been using ibuprofen and did do a kinesiotaping as well as lidocaine patch.  With those he still has a lot of pain.      Fall   The accident occurred more than 1 week ago (Fell about 4 weeks ago while walking his dog). He fell from a height of 3 to 5 ft. Impact surface: He fell on ice. The point of impact was the head, left shoulder, right shoulder and buttocks. The pain is present in the back. The pain is moderate. The symptoms are aggravated by ambulation, flexion, standing and movement. Pertinent negatives include no bowel  incontinence, fever, headaches, numbness or tingling. He has tried ice and NSAID for the symptoms. The treatment provided mild relief.       The following portions of the patient's history were reviewed and updated as appropriate: allergies, current medications, past family history, past medical history, past social history, past surgical history and problem list.    Review of Systems   Constitutional: Negative for activity change, fatigue and fever.   Cardiovascular: Negative.    Gastrointestinal: Negative for bowel incontinence.   Genitourinary: Negative.    Musculoskeletal: Positive for back pain and gait problem. Negative for neck pain.   Skin: Negative for wound.   Neurological: Negative for tingling, numbness and headaches.     Vitals:    02/11/19 1348   BP: 110/64   Pulse: 64   Resp: 16   Weight: 159 lb 3 oz (72.2 kg)             Objective:    Physical Exam   Constitutional: He is oriented to person, place, and time. He appears well-developed and well-nourished. He appears distressed.   He is in some distress due to pain, he states and intermittently will stand up because of pain.   Cardiovascular: Intact distal pulses.   Pulmonary/Chest: Effort normal.   Musculoskeletal:   Patient has some noted on the left lower lumbar region.  He is unable to sit for long, and unable to twist.  He does walk with antalgic gait.   Neurological: He is alert and oriented to person, place, and time.

## 2021-06-24 NOTE — PROGRESS NOTES
ASSESSMENT: Rasheed Hanson (Wilber) is a 73 y.o. male  with a BMI of 24.86 with past medical history significant for hyperlipidemia, gout, benign prostatic hypertrophy who presents today for new patient evaluation of acute left-sided low back pain of approximately 4 weeks duration.  Patient does have pain going into the left leg, into the lower left leg and ankle.  The etiology of his pain is unclear at this time.  Even the mechanism of injury (a fall - initial encounter) it could be that he has irritated his sacroiliac joint and the pain going down the leg is referred pain.  It is possible that he has radicular leg pain given his significant degenerative changes secondary to a lumbosacral scoliosis.  At this time he is neurologically intact and without any red flag symptoms.    GREG: 40%  WHO-5: 15 (the patient is not interested in behavioral health services)    PLAN:  A shared decision making model was used.  The patient's values and choices were respected.  The following represents what was discussed and decided upon by the physician and the patient.      1.  DIAGNOSTIC TESTS:    -The patient's MRI of the lumbar spine was personally reviewed today by the physician with the physician performing her own interpretation.  This was performed through [facility] in February 2019.  Patient does have a lumbosacral scoliosis.  He does have surgical changes seen at the L3-4 level.  There is significant facet arthropathy seen at the L3-4 facet joints.  The patient does have some left L3-4 and L4-5 foraminal stenosis, though this does appear to be largely mild.  There is some lateral recess stenosis seen at the L2-3, L3-4, L4-5 levels, but is actually worse on the right side compared to the left.  The images were shown to the patient and the findings were explained using a spine model.  -The patient's x-rays of the lumbar spine were personally reviewed today by the physician with the physician performing her own  interpretation.  These were performed in February 2019.  -The patient signed a release of information so that his injection records from [facility] could be obtained.  2.  PHYSICAL THERAPY:  Discussed the importance of core strengthening, ROM, stretching exercises with the patient and how each of these entities is important in decreasing pain.  Explained to the patient that the purpose of physical therapy is to teach the patient a home exercise program.  These exercises need to be performed every day in order to decrease pain and prevent future occurrences of pain.  Likened it to brushing one's teeth.  The patient wanted to go so an order was provided.  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  He would like to go to see [facility] private practice in [place].  An order was provided.  He will need to call Mr. Florian's office to schedule a physical therapy appointment.  3.  MEDICATIONS: Discussed trialing prescription naproxen with the patient.  At this time he would prefer to try over-the-counter naproxen first.  He can take naproxen/Aleve (220 mg over-the-counter tablets) 1 or 2 tablets up to twice a day as needed for pain.  He is encouraged to take this medication with food/water to prevent any stomach upset.  If he would like to try prescription strength naproxen, a prescription can be sent for naproxen 500 mg 1 tablet twice a day as needed for pain.  4.  INTERVENTIONS: Discussed with patient that it is difficult to know exactly where to do an injection to alleviate his pain.  Injections may be diagnostic at first as opposed to therapeutic.  Discussed a left sacroiliac joint injection to start, but recommended that he try conservative treatment first.  The patient was in agreement with this.  If he fails to have relief with conservative treatment, would likely start with a left sacroiliac joint injection.  If he fails  to have relief with this, could consider repeating an epidural steroid injection (potentially would try the same level that he had done through [facility] orth[facility]).  5.  PATIENT EDUCATION:   -Discussed the different etiologies of pain with the patient.  Discussed how imaging does not tell the physician exactly where the pain is coming from.  Discussed the potential diagnosis of sacroiliac joint pain versus lumbar radicular pain.  -All of his questions were answered to his satisfaction today.  He was in agreement with the treatment plan.  6.  FOLLOW-UP:   Nurse navigation is asked to call the patient in 3 weeks.  If he is doing better at that time then he can follow-up as needed.  If he is not doing better, he will have the option to give the physical therapy more time or the prior authorization process could be started for an injection.  The patient was advised that it will typically take 10 business days for insurance to approve an injection once the prior authorization process has been started.  If he has any questions, concerns, or any significant worsening of his pain prior to that time, he is encouraged to contact the clinic either via Share[employer] or via the nurse navigation line, for which the phone number was provided today.      SUBJECTIVE:  Rasheed Hanson (Wilber)  Is a 73 y.o. male who presents today for new patient evaluation of low back pain and left leg pain.  Patient reports the pain started about 4 weeks ago after slip and fall.  He landed on his buttocks.  He reports that for about 2 weeks he was able to get by and manage the pain on his own.  However about 2 weeks after the fall the pain became so bad that he could hardly stand.  Initially he felt that the pain was fairly superficial but then 2 weeks after the fall he felt that it became deeper into the buttock and then started going down the leg.  The pain is located in the left side of the low back and then radiates into the left buttock and  left lateral thigh and lateral lower leg going into the lateral aspect of the left foot.  He occasionally has some numbness and tingling but he states that most of the pain in the leg is pain.  He denies any weakness in the leg.  No symptoms in the right leg.  The patient states that this episode of pain is worse with standing, walking for more than a quarter of a mile.  If he walks for any long distance, he has significantly increased pain when he is trying to sleep at night.  He has been managing the pain with ibuprofen and lidocaine patch.  He is also discovered that Kinesiotape can provide some relief.  Sitting down and lying down typically help alleviate the pain.  However when he tries to sleep at night, he has to get in a very specific position in order to be comfortable to sleep.  This can involve putting a wedge pillow behind his head and then meticulous positioning of his legs.  Sometimes he can sleep face down, again if he meticulously ranges the pillows.  He has not done any physical therapy for this episode of pain.  The last time he did physical therapy was prior to his lumbar decompression surgery in 2016.  Patient does state that he underwent one injection after surgery.  He thinks it was an epidural injection but does not remember the level.  He says that it provided relief for about a year.  He really did not have any significant aggravation of his back pain until the fall 2018, though this was overall largely manageable.  His primary care physician gave him oxycodone 5 mg.  He takes 1 tablet about 3-5 days/week.  He has not continue to do home exercise program from physical therapy, but he does try to stretch on a regular basis and work out at the gym on a regular basis.    The patient does have a second opinion with Dr. Overton scheduled for tomorrow.    Medications:  Reviewed and correct in the chart.      Allergies: Reviewed and Penicillin (rash), Sulfa (rash), Tylenol (irregular  heartbeat).    PMH:  Reviewed and significant for hyperlipidemia, gout, benign prostatic hypertrophy.    PSH:  Reviewed and significant for Lasix surgery and right rotator cuff repair lumbar decompression surgery..    Family History:  Reviewed and significant for hypertension, prostate cancer, kidney disease, osteoarthritis.    Social History: The patient is a retired  for [employer].  He quit smoking about 44 years ago.  He drinks 1-2 glasses of wine per day.  He denies any illicit drug use.    ROS: Positive for left leg pain as well as insomnia when he has increased pain.  Specifically negative for bowel/bladder dysfunction, fevers,chills, appetite changes, unexplained weight loss.   Otherwise 13 systems reviewed are negative.  Please see the patient's intake questionnaire from today for details.      OBJECTIVE:  PHYSICAL EXAMINATION:    CONSTITUTIONAL:  Vital signs as above.  No acute distress.  The patient is well nourished and well groomed.  PSYCHIATRIC:  The patient is awake, alert, oriented to person, place, time and answering questions appropriately with clear speech.    SKIN:  Skin over the face, bilateral lower extremities, and posterior torso is clean, dry, intact without rashes.  Patient does have a lidocaine patch over the left SI joint, but it is not removed for the exam today.  Patient does have a well-healed surgical incision over the lower lumbar spine.  GAIT:  Gait is non-antalgic.  The patient is able to heel and toe walk without significant difficulty.    STANDING EXAMINATION: The patient does not have any significant pain over the left lumbar paraspinal muscles or over the left gluteal muscles.  No tenderness over the structures on the right side.  The patient does have some pain with lumbar flexion and with lumbar extension.  He does not have any significant pain with bilateral lumbar sidebending or bilateral upper body trunk rotation.  MUSCLE STRENGTH:  The patient has 5/5 strength  for the bilateral hip flexors, knee flexors/extensors, ankle dorsiflexors/plantar flexors, great toe extensors, ankle evertors/invertors.  NEUROLOGICAL:  2+/4 patellar, medial hamstring, and achilles reflexes bilaterally.  Downgoing Babinski's bilaterally.  No ankle clonus bilaterally. Sensation to light touch is intact in the bilateral L4, L5, and S1 dermatomes.  VASCULAR:  2/4 dorsalis pedis pulses bilaterally.  Bilateral lower extremities are warm.  There is no pitting edema of the bilateral lower extremities.  ABDOMINAL:  Soft, non-distended, non-tender throughout all quadrants.  No pulsatile mass palpated in the left lower quadrant.  LYMPH NODES:  No palpable or tender inguinal/popliteal lymph nodes.  MUSCULOSKELETAL: Negative straight leg raising test on the right side.  The patient has restricted range of motion of the right hip in a flexed position testing internal/external rotation, more restriction with internal rotation and external rotation.  Fabere's test on the right side does reproduce left low back pain localizing to the left SI joint.  Negative straight leg raising test on the left side.  Patient does have restricted range of motion of the left hip when tested in a flexed position, testing internal/external rotation.  Again more restricted with internal rotation of the left hip and external rotation.  Fabere's test on the left side does reproduce left low back pain localizing to the left SI joint.  Negative standing stork test bilaterally.  He does have more difficulty standing on the left leg compared to the right.

## 2021-06-24 NOTE — TELEPHONE ENCOUNTER
Refill Approved    Rx renewed per Medication Renewal Policy. Medication was last renewed on 4/27/18.    Elizabeth Borges, [facility] Triage/Med Refill 2/13/2019     Requested Prescriptions   Pending Prescriptions Disp Refills     allopurinol (ZYLOPRIM) 100 MG tablet 90 tablet 2     Sig: Take 1 tablet (100 mg total) by mouth daily.    Allopurinol/Febuxostat Refill Protocol  Passed - 2/12/2019  8:33 AM       Passed - LFT or AST or ALT in last 12 months    Albumin   Date Value Ref Range Status   04/27/2018 3.7 3.5 - 5.0 g/dL Final     Bilirubin, Total   Date Value Ref Range Status   04/27/2018 1.0 0.0 - 1.0 mg/dL Final     Alkaline Phosphatase   Date Value Ref Range Status   04/27/2018 84 45 - 120 U/L Final     AST   Date Value Ref Range Status   04/27/2018 24 0 - 40 U/L Final     ALT   Date Value Ref Range Status   04/27/2018 18 0 - 45 U/L Final     Protein, Total   Date Value Ref Range Status   04/27/2018 6.5 6.0 - 8.0 g/dL Final               Passed - Visit with PCP or prescribing provider visit in past 12 months    Last office visit with prescriber/PCP: 2/11/2019 Joaquín Forbes MD OR same dept: 2/11/2019 Joaquín Forbes MD OR same specialty: 2/11/2019 Joaquín Forbes MD  Last physical: 4/27/2018 Last MTM visit: Visit date not found   Next visit within 3 mo: Visit date not found  Next physical within 3 mo: Visit date not found  Prescriber OR PCP: Joaquín Forbes MD  Last diagnosis associated with med order: 1. Gout  - allopurinol (ZYLOPRIM) 100 MG tablet; Take 1 tablet (100 mg total) by mouth daily.  Dispense: 90 tablet; Refill: 2    If protocol passes may refill for 12 months if within 3 months of last provider visit (or a total of 15 months).

## 2021-06-24 NOTE — PATIENT INSTRUCTIONS - HE
An order for physical therapy has been provided today.  You will need to call Rajeev Florian's office to set up Physical Therapy.    It will be very important for you to do your physical therapy exercises on a regular basis to decrease your pain and prevent future flares of pain.    You can take over-the-counter naproxen (220 mg tablet) 1 or 2 tablets up to once or twice a day as needed for pain.  You may want to take this medication with food/water to prevent any stomach upset.  If you would like to try prescription strength naproxen (500 mg), please call the clinic and an order can be provided.    A nurse will call you in 3 weeks to see how you are doing.  If you are doing better at that time, you are welcome to follow-up as needed.  If you are not doing better, the nurse will relay this information to the physician and then further recommendations can be made. Please do not hesitate to contact the clinic at [phone] if you have any questions/concerns or any worsening of your pain prior to that time.  You are also welcome to contact Dr. Machuca via [employer]Le[employer].

## 2021-06-24 NOTE — TELEPHONE ENCOUNTER
Phone call placed to patient in follow up to new patient consultation appointment of 2/13/19. Left message to return call.

## 2021-06-24 NOTE — TELEPHONE ENCOUNTER
Second call placed to patient in follow up to his new patient appointment. Wife reports he is out of town and we should try to reach him in a day or two.

## 2021-09-03 ASSESSMENT — ACTIVITIES OF DAILY LIVING (ADL): CURRENT_FUNCTION: NO ASSISTANCE NEEDED

## 2021-09-10 ENCOUNTER — LAB (OUTPATIENT)
Dept: LAB | Facility: CLINIC | Age: 76
End: 2021-09-10

## 2021-09-10 ENCOUNTER — MYC MEDICAL ADVICE (OUTPATIENT)
Dept: FAMILY MEDICINE | Facility: CLINIC | Age: 76
End: 2021-09-10

## 2021-09-10 ENCOUNTER — OFFICE VISIT (OUTPATIENT)
Dept: FAMILY MEDICINE | Facility: CLINIC | Age: 76
End: 2021-09-10
Payer: COMMERCIAL

## 2021-09-10 VITALS
DIASTOLIC BLOOD PRESSURE: 70 MMHG | SYSTOLIC BLOOD PRESSURE: 118 MMHG | HEART RATE: 64 BPM | HEIGHT: 66 IN | WEIGHT: 156 LBS | BODY MASS INDEX: 25.07 KG/M2

## 2021-09-10 DIAGNOSIS — Z00.00 ENCOUNTER FOR MEDICARE ANNUAL WELLNESS EXAM: ICD-10-CM

## 2021-09-10 DIAGNOSIS — R35.1 NOCTURIA ASSOCIATED WITH BENIGN PROSTATIC HYPERPLASIA: ICD-10-CM

## 2021-09-10 DIAGNOSIS — N52.9 ERECTILE DYSFUNCTION, UNSPECIFIED ERECTILE DYSFUNCTION TYPE: ICD-10-CM

## 2021-09-10 DIAGNOSIS — Z00.00 ROUTINE GENERAL MEDICAL EXAMINATION AT A HEALTH CARE FACILITY: Primary | ICD-10-CM

## 2021-09-10 DIAGNOSIS — Z00.00 ROUTINE GENERAL MEDICAL EXAMINATION AT A HEALTH CARE FACILITY: ICD-10-CM

## 2021-09-10 DIAGNOSIS — N40.1 NOCTURIA ASSOCIATED WITH BENIGN PROSTATIC HYPERPLASIA: ICD-10-CM

## 2021-09-10 DIAGNOSIS — Z12.5 SCREENING FOR MALIGNANT NEOPLASM OF PROSTATE: ICD-10-CM

## 2021-09-10 DIAGNOSIS — M10.00 IDIOPATHIC GOUT, UNSPECIFIED CHRONICITY, UNSPECIFIED SITE: ICD-10-CM

## 2021-09-10 LAB
ALBUMIN SERPL-MCNC: 3.8 G/DL (ref 3.5–5)
ALP SERPL-CCNC: 79 U/L (ref 45–120)
ALT SERPL W P-5'-P-CCNC: 18 U/L (ref 0–45)
ANION GAP SERPL CALCULATED.3IONS-SCNC: 12 MMOL/L (ref 5–18)
AST SERPL W P-5'-P-CCNC: 22 U/L (ref 0–40)
BILIRUB SERPL-MCNC: 0.8 MG/DL (ref 0–1)
BUN SERPL-MCNC: 21 MG/DL (ref 8–28)
CALCIUM SERPL-MCNC: 9.4 MG/DL (ref 8.5–10.5)
CHLORIDE BLD-SCNC: 106 MMOL/L (ref 98–107)
CHOLEST SERPL-MCNC: 182 MG/DL
CO2 SERPL-SCNC: 23 MMOL/L (ref 22–31)
CREAT SERPL-MCNC: 1.27 MG/DL (ref 0.7–1.3)
FASTING STATUS PATIENT QL REPORTED: NORMAL
GFR SERPL CREATININE-BSD FRML MDRD: 54 ML/MIN/1.73M2
GLUCOSE BLD-MCNC: 103 MG/DL (ref 70–125)
HDLC SERPL-MCNC: 68 MG/DL
LDLC SERPL CALC-MCNC: 101 MG/DL
POTASSIUM BLD-SCNC: 4.5 MMOL/L (ref 3.5–5)
PROT SERPL-MCNC: 6.3 G/DL (ref 6–8)
PSA SERPL-MCNC: 3.85 UG/L (ref 0–6.5)
SODIUM SERPL-SCNC: 141 MMOL/L (ref 136–145)
TRIGL SERPL-MCNC: 65 MG/DL

## 2021-09-10 PROCEDURE — 80053 COMPREHEN METABOLIC PANEL: CPT

## 2021-09-10 PROCEDURE — 36415 COLL VENOUS BLD VENIPUNCTURE: CPT

## 2021-09-10 PROCEDURE — 99397 PER PM REEVAL EST PAT 65+ YR: CPT | Performed by: FAMILY MEDICINE

## 2021-09-10 PROCEDURE — G0103 PSA SCREENING: HCPCS

## 2021-09-10 PROCEDURE — 80061 LIPID PANEL: CPT

## 2021-09-10 RX ORDER — TADALAFIL 10 MG/1
10 TABLET ORAL DAILY PRN
Qty: 30 TABLET | Refills: 1 | Status: SHIPPED | OUTPATIENT
Start: 2021-09-10 | End: 2022-10-20

## 2021-09-10 RX ORDER — TAMSULOSIN HYDROCHLORIDE 0.4 MG/1
0.4 CAPSULE ORAL DAILY
Qty: 90 CAPSULE | Refills: 1 | Status: SHIPPED | OUTPATIENT
Start: 2021-09-10 | End: 2022-02-16

## 2021-09-10 RX ORDER — ALLOPURINOL 100 MG/1
100 TABLET ORAL DAILY
Qty: 90 TABLET | Refills: 3 | Status: SHIPPED | OUTPATIENT
Start: 2021-09-10 | End: 2022-07-22

## 2021-09-10 ASSESSMENT — MIFFLIN-ST. JEOR: SCORE: 1375.36

## 2021-09-10 NOTE — PROGRESS NOTES
SUBJECTIVE:   Rasheed Hanson is a 76 year old male who presents for Preventive Visit.      Patient has been advised of split billing requirements and indicates understanding: Yes   Are you in the first 12 months of your Medicare coverage?  No    HPI   He is a Medicare he comes in today for his routine physical exam.  As always he noted no major concerns except for having increasing urination at nighttime.  He has been taking saw palmetto but was advised to stop taking it by some friends.  That had actually caused him to have increasing amount of urination.  And because of that he want to know what his PSA is.  But he is otherwise doing well.  Needs medication refilled.  He continues to be physically active.  Do you feel safe in your environment? Yes    Have you ever done Advance Care Planning? (For example, a Health Directive, POLST, or a discussion with a medical provider or your loved ones about your wishes): Yes, patient states has an Advance Care Planning document and will bring a copy to the clinic.     Fall risk  Fallen 2 or more times in the past year?: No  Any fall with injury in the past year?: No   Cognitive Screening   1) Repeat 3 items (Leader, Season, Table)    2) Clock draw: NORMAL  3) 3 item recall: Recalls 2 objects   Results: ABNORMAL clock, 1-2 items recalled: PROBABLE COGNITIVE IMPAIRMENT,    Mini-CogTM Copyright S Kasia. Licensed by the author for use in [facility]; reprinted with permission (sudeep@.[facility]). All rights reserved.      Do you have sleep apnea, excessive snoring or daytime drowsiness?: no    Reviewed and updated as needed this visit by clinical staff  Tobacco  Allergies  Meds            Family History   Problem Relation Age of Onset     Cancer Mother         bladder     Hypertension Mother      Prostate Cancer Maternal Grandfather      Cancer Father         Brain Cancer     Alcoholism Brother          oF MVA     Kidney Disease Brother      Arthritis Brother          Back problems      Social History     Socioeconomic History     Marital status:      Spouse name: Not on file     Number of children: Not on file     Years of education: Not on file     Highest education level: Not on file   Occupational History     Not on file   Tobacco Use     Smoking status: Never Smoker     Smokeless tobacco: Never Used   Substance and Sexual Activity     Alcohol use: Yes     Alcohol/week: 14.0 standard drinks     Drug use: No     Sexual activity: Yes     Partners: Female   Other Topics Concern     Not on file   Social History Narrative     Not on file     Social Determinants of Health     Financial Resource Strain:      Difficulty of Paying Living Expenses:    Food Insecurity:      Worried About Running Out of Food in the Last Year:      Ran Out of Food in the Last Year:    Transportation Needs:      Lack of Transportation (Medical):      Lack of Transportation (Non-Medical):    Physical Activity:      Days of Exercise per Week:      Minutes of Exercise per Session:    Stress:      Feeling of Stress :    Social Connections:      Frequency of Communication with Friends and Family:      Frequency of Social Gatherings with Friends and Family:      Attends Oriental orthodox Services:      Active Member of Clubs or Organizations:      Attends Club or Organization Meetings:      Marital Status:    Intimate Partner Violence:      Fear of Current or Ex-Partner:      Emotionally Abused:      Physically Abused:      Sexually Abused:       Past Surgical History:   Procedure Laterality Date     ARTHROSCOPY SHOULDER ROTATOR CUFF REPAIR Right      LASIK        Past Medical History:   Diagnosis Date     Acute gouty arthropathy     Created by Conversion      Bronchitis, not specified as acute or chronic     Created by Conversion      Dermatophytosis of foot     Created by Conversion      Enthesopathy     Created by Conversion  Replacement Utility updated for latest IMO load     Infective otitis externa   "   Created by Conversion  Replacement Utility updated for latest IMO load     Other and unspecified hyperlipidemia     Created by Conversion      Thoracic or lumbosacral neuritis or radiculitis, unspecified     Created by Conversion         Reviewed and updated as needed this visit by Provider                Social History     Tobacco Use     Smoking status: Never Smoker     Smokeless tobacco: Never Used   Substance Use Topics     Alcohol use: Yes     Alcohol/week: 14.0 standard drinks     If you drink alcohol do you typically have >3 drinks per day or >7 drinks per week? No    Alcohol Use 9/3/2021   Prescreen: >3 drinks/day or >7 drinks/week? No   No flowsheet data found.          Current providers sharing in care for this patient include:   Patient Care Team:  Joaquín Forbes MD as PCP - General  Joaquín Forbes MD as Assigned PCP    The following health maintenance items are reviewed in Epic and correct as of today:  Health Maintenance Due   Topic Date Due     ANNUAL REVIEW OF HM ORDERS  Never done     HEPATITIS C SCREENING  Never done     ZOSTER IMMUNIZATION (2 of 3) 01/26/2016     FALL RISK ASSESSMENT  08/18/2021     INFLUENZA VACCINE (1) Never done             Review of Systems  Constitutional, HEENT, cardiovascular, pulmonary, GI, , musculoskeletal, neuro, skin, endocrine and psych systems are negative, except as otherwise noted.    OBJECTIVE:   /70   Pulse 64   Ht 1.676 m (5' 6\")   Wt 70.8 kg (156 lb)   BMI 25.18 kg/m   Estimated body mass index is 25.18 kg/m  as calculated from the following:    Height as of this encounter: 1.676 m (5' 6\").    Weight as of this encounter: 70.8 kg (156 lb).  Physical Exam  GENERAL: healthy, alert and no distress  EYES: Eyes grossly normal to inspection, PERRL and conjunctivae and sclerae normal  HENT: ear canals and TM's normal, nose and mouth without ulcers or lesions  NECK: no adenopathy, no asymmetry, masses, or scars and thyroid normal to " "palpation  RESP: lungs clear to auscultation - no rales, rhonchi or wheezes  CV: regular rate and rhythm, normal S1 S2, no S3 or S4, no murmur, click or rub, no peripheral edema and peripheral pulses strong  ABDOMEN: soft, nontender, no hepatosplenomegaly, no masses and bowel sounds normal  MS: no gross musculoskeletal defects noted, no edema  SKIN: no suspicious lesions or rashes  NEURO: Normal strength and tone, mentation intact and speech normal  PSYCH: mentation appears normal, affect normal/bright      ASSESSMENT / PLAN:   Rasheed was seen today for wellness visit.    Diagnoses and all orders for this visit:    Routine general medical examination at a health care facility    Gout    Erectile dysfunction, unspecified erectile dysfunction type  -     tadalafil (CIALIS) 10 MG tablet; Take 1 tablet (10 mg) by mouth daily as needed (Erectile dysfunction.)    Nocturia associated with benign prostatic hyperplasia  -     tamsulosin (FLOMAX) 0.4 MG capsule; Take 1 capsule (0.4 mg) by mouth daily    Encounter for Medicare annual wellness exam    Idiopathic gout, unspecified chronicity, unspecified site  -     allopurinol (ZYLOPRIM) 100 MG tablet; Take 1 tablet (100 mg) by mouth daily        Patient has been advised of split billing requirements and indicates understanding: Yes  COUNSELING:  Reviewed preventive health counseling, as reflected in patient instructions  Special attention given to:       Regular exercise       Healthy diet/nutrition       Prostate cancer screening       and symptoms that is consistent with BPH.  I will look out for his PSA and if it is high I will refer him to talk to the urologist.  He agrees with it that he does not think that he will want to do the biopsy if it comes to that.    Estimated body mass index is 25.18 kg/m  as calculated from the following:    Height as of this encounter: 1.676 m (5' 6\").    Weight as of this encounter: 70.8 kg (156 lb).        He reports that he has never " smoked. He has never used smokeless tobacco.      Appropriate preventive services were discussed with this patient, including applicable screening as appropriate for cardiovascular disease, diabetes, osteopenia/osteoporosis, and glaucoma.  As appropriate for age/gender, discussed screening for colorectal cancer, prostate cancer, breast cancer, and cervical cancer. Checklist reviewing preventive services available has been given to the patient.    Reviewed patients plan of care and provided an AVS. The Basic Care Plan (routine screening as documented in Health Maintenance) for Rasheed meets the Care Plan requirement. This Care Plan has been established and reviewed with the Patient.    Counseling Resources:  ATP IV Guidelines  Pooled Cohorts Equation Calculator  Breast Cancer Risk Calculator  Breast Cancer: Medication to Reduce Risk  FRAX Risk Assessment  ICSI Preventive Guidelines  Dietary Guidelines for Americans, 2010  USDA's MyPlate  ASA Prophylaxis  Lung CA Screening    Joaquín Forbes MD  [facility]    Identified Health Risks:

## 2021-09-10 NOTE — PATIENT INSTRUCTIONS
Patient Education   Personalized Prevention Plan  You are due for the preventive services outlined below.  Your care team is available to assist you in scheduling these services.  If you have already completed any of these items, please share that information with your care team to update in your medical record.  Health Maintenance Due   Topic Date Due     ANNUAL REVIEW OF HM ORDERS  Never done     Hepatitis C Screening  Never done     Zoster (Shingles) Vaccine (2 of 3) 01/26/2016     FALL RISK ASSESSMENT  08/18/2021     Flu Vaccine (1) Never done

## 2021-09-19 ENCOUNTER — HEALTH MAINTENANCE LETTER (OUTPATIENT)
Age: 76
End: 2021-09-19

## 2021-11-02 NOTE — PROGRESS NOTES
Assessment:   Rasheed Hanson (AKA Wilber)  is a 76 year old y.o. male with past medical history significant for hyperlipidemia, gout, benign prostatic hypertrophy who presents today for follow-up regarding left lateral hip pain.  This pain has been present for a few months.  There is no recent injury that triggered this pain.  His pain is consistent with left greater trochanteric bursitis, and he does have weakness of the left hip abductors.  He is neurologically intact and without any red flag symptoms.    Patient was last seen on February 13 of 2019.  At that time, he was complaining of acute left-sided low back pain with left radicular leg pain.  The symptoms resolved within a couple of weeks with stretching and exercises.    PSP:  Dr. Machuca       Plan:     A shared decision making plan was used.  The patient's values and choices were respected.  The following represents what was discussed and decided upon by the physician and the patient.      1.  DIAGNOSTIC TESTS: The patient's MRI of the lumbar spine from February 2019 was personally reviewed today by the physician with physician performing her own interpretation.  There is a lumbosacral scoliosis.  There are surgical changes seen at the L3-4 level.  There is significant facet arthropathy seen at the L3-4 facet joints.  There is left L3-4 and left L4-5 foraminal stenosis, though this is mild.  There is lateral recess stenosis at the L2-3, L3-4, L4-5 levels, but it is actually worse on the right side compared to the left.  -There is no need for updated imaging at this time of his lumbar spine.  -Given his pain was so consistent with greater trochanteric bursitis, I did not feel that there was any need for imaging of the hip.  If he fails to have relief as expected, we could consider an x-ray of the hip and potentially progressed to an MRI if needed.  2.  PHYSICAL THERAPY: The patient was referred to physical therapy back in 2019.  He did complete physical  therapy at that time.  I did offer him to return to physical therapy to work on hip abductor strengthening, but he declined stating he would rather do the exercises on his own at home which is reasonable.  3.  MEDICATIONS:    -He can continue to use diclofenac gel as needed.  -The patient can continue to take over-the-counter naproxen and Tylenol as needed for pain.  He is also taking supplements such as Boswella, turmeric.  4.  INTERVENTIONS: I did offer the patient a left greater trochanteric bursa injection today.  He did want to proceed.  The risks of the injection including infection, bleeding, temporarily worsening pain, damage to surrounding structures was discussed with the patient and he gave verbal consent to proceed and also signed the informed consent form.  A total of 40 mg of Depo-Medrol was used today.  Please see the procedure note below for details regarding this procedure.  -Second dose of the Covid vaccine was March 5 of 2021.  5.  PATIENT EDUCATION:    -I did explain the diagnosis of bursitis and how he has come to have bursitis.  I emphasized that the treatment for bursitis in addition to the injection decreasing the pain quickly, is to actually strengthen the hip abductor muscles.  -Did advise him to avoid submerging the injection site underwater.  However he can take a shower as soon as he gets home.  This on it is just fine for him to use.  -He is asked to monitor the injection site for any redness, drainage, swelling.  If he has any of these signs or experiences any fevers or chills, he is asked to call the clinic immediately or go to the nearest emergency room as this could be a sign of an infection.  -I did explain that it would likely take at least a week for the injection to start working, with the peak effect coming at 2 weeks.  -I did show him the clamshell exercise in addition to the leg lift exercise and explained how he can make these exercises more difficult.  -All of his questions  were answered to his satisfaction today.  He was in agreement the treatment plan.  6.  FOLLOW-UP: 2 weeks from today to evaluate his response to the injection..  If there are any questions/concerns or any significant worsening of pain prior to that time, the patient is asked to call the clinic via the nurse navigation line or via [employer].     Subjective:     Rasheed Hanson (AKA Wilber) is a 76 year old male who presents today for follow-up regarding left lateral hip pain.  The pain has been present for couple of months.  He reminds me that he fell 3 to 4 years ago onto his left hip and he wonders if this has made the side more susceptible to having pain.    Pain is just to the left side of his lateral hip.  He can extend more distally towards the knee but does not go below the knee.  Occasionally radiates into the left groin as well.  No symptoms on the right side.  He rates his pain today at a 3 out of 10.  At worst it is a 7 out of 10.  At best it is a 0 out of 10.  His pain is worse when he presses over the area.  Reports that laying on his left side is significantly painful.  Standing and walking for long periods of time will also aggravate it.  Reports that if he can limit his standing and walking and sit down, this will help with the pain.  Icing it is also been helpful.  He has been trying over-the-counter supplements such as Boswella, turmeric and diclofenac gel.  He also occasionally takes ibuprofen when the pain is really bad.  These are helpful.  He is wondering if he has bursitis and what else can be done for this.    The patient was last seen on February 13 of 2019.  At that time he was complaining of acute left-sided low back pain with left radicular leg pain, but the symptoms resolved after a couple weeks with stretching and exercises.    Past medical history is reviewed and is unchanged for any new medical diagnoses in the interim.      Family history is reviewed and is unchanged in the interim.       Review of Systems:  Positive for numbness/tingling in the left leg.  Positive for headaches and pain that makes it difficult to sleep on his left side at night..  Pertinent negatives include no weakness, fevers, chills, unexplained weight loss, bowel incontinence, bladder incontinence, trips, stumbles, falls.  All others reviewed and are negative.     Objective:   CONSTITUTIONAL:  Vital signs as above.  No acute distress.  The patient is well nourished and well groomed.    PSYCHIATRIC:  The patient is awake, alert, oriented to person, place and time.  The patient is answering questions appropriately with clear speech.  Normal affect.  SKIN:  Skin over the face, posterior torso, bilateral upper and lower extremities is clean, dry, intact without rashes.  MUSCULOSKELETAL:  Gait is non-antalgic.  The patient is able to heel and toe walk without any difficulty.  He does have significant tenderness over the left greater trochanteric bursa.   He has 4/5 strength of the left hip abductors but 5/5 strength of the right hip abductors.  The patient has 5/5 strength for the bilateral hip flexors, knee flexors/extensors, ankle dorsiflexors/plantar flexors, ankle evertors/invertors.  He does have some mild pain with varus test bilaterally, with pain localizing to the ipsilateral groin.  No pain with bilateral hip scouring or hip internal and external rotation bilaterally.  NEUROLOGICAL: 2/4 patellar, medial hamstring, achilles reflexes which are symmetric bilaterally.  No ankle clonus bilaterally.  Sensation to light touch is intact in the bilateral L4, L5, and S1 dermatomes.       Pre-Procedure Diagnosis: Left Greater Trochanteric Bursitis  Post-Procedure Diagnosis: Same    Procedure: Lft Greater Trochanteric Bursa injection    The risks and benefits of the procedure were discussed with the patient and he elected to proceed with the procedure and signed the informed consent form.     A time out was performed, verifying the  patient, type of injection, and laterality.    The most tender area over the  left greater trochanter was marked by indenting the skin (no marker was used). The skin over this area was then cleansed with povidone scrubs x 3. Using a no touch technique, a 22 guage 3.5 inch needle was inserted down to bone and then withdrawn 2-3 mm. After aspiration was negative a 5  mL solution consisting of 1 mL of 40  mg of depomedrol mixed with 4  mL of 0.5% ropivacaine was injected. There was no resistance with injection.    There was no bleeding.  A bandaid was placed over the injection site.    He immediately noticed less tenderness to palpation over the left greater trochanteric bursa area.    Afterwards the patient was immediately able to stand of his own power and noted immediate relief. Advised the patient that he  may shower but should not submerge the left  hip area in water.  If he  has any redness/drainage/swelling over the injection site, or any fevers/chills, he  is asked to call the clinic immediately or go to the nearest Emergency room.

## 2021-11-03 ENCOUNTER — OFFICE VISIT (OUTPATIENT)
Dept: PHYSICAL MEDICINE AND REHAB | Facility: CLINIC | Age: 76
End: 2021-11-03
Payer: COMMERCIAL

## 2021-11-03 VITALS — DIASTOLIC BLOOD PRESSURE: 83 MMHG | TEMPERATURE: 98.3 F | HEART RATE: 50 BPM | SYSTOLIC BLOOD PRESSURE: 148 MMHG

## 2021-11-03 DIAGNOSIS — M25.552 HIP PAIN, LEFT: ICD-10-CM

## 2021-11-03 DIAGNOSIS — M70.62 TROCHANTERIC BURSITIS OF LEFT HIP: Primary | ICD-10-CM

## 2021-11-03 PROCEDURE — 20610 DRAIN/INJ JOINT/BURSA W/O US: CPT | Mod: LT | Performed by: PHYSICAL MEDICINE & REHABILITATION

## 2021-11-03 PROCEDURE — 99213 OFFICE O/P EST LOW 20 MIN: CPT | Mod: 25 | Performed by: PHYSICAL MEDICINE & REHABILITATION

## 2021-11-03 RX ORDER — ROPIVACAINE HYDROCHLORIDE 5 MG/ML
4 INJECTION, SOLUTION EPIDURAL; INFILTRATION; PERINEURAL ONCE
Status: COMPLETED | OUTPATIENT
Start: 2021-11-03 | End: 2021-11-03

## 2021-11-03 RX ORDER — METHYLPREDNISOLONE ACETATE 40 MG/ML
40 INJECTION, SUSPENSION INTRA-ARTICULAR; INTRALESIONAL; INTRAMUSCULAR; SOFT TISSUE ONCE
Status: COMPLETED | OUTPATIENT
Start: 2021-11-03 | End: 2021-11-03

## 2021-11-03 RX ADMIN — ROPIVACAINE HYDROCHLORIDE 4 ML: 5 INJECTION, SOLUTION EPIDURAL; INFILTRATION; PERINEURAL at 10:38

## 2021-11-03 RX ADMIN — METHYLPREDNISOLONE ACETATE 40 MG: 40 INJECTION, SUSPENSION INTRA-ARTICULAR; INTRALESIONAL; INTRAMUSCULAR; SOFT TISSUE at 10:37

## 2021-11-03 ASSESSMENT — PAIN SCALES - GENERAL: PAINLEVEL: MILD PAIN (3)

## 2021-11-03 NOTE — LETTER
11/3/2021         RE: Rasheed Hanson  [address]  [place] [address]        Dear Colleague,    Thank you for referring your patient, Rasheed Hanson, to the [facility]. Please see a copy of my visit note below.    Assessment:   Rasheed Hanson (AKA Wilber)  is a 76 year old y.o. male with past medical history significant for hyperlipidemia, gout, benign prostatic hypertrophy who presents today for follow-up regarding left lateral hip pain.  This pain has been present for a few months.  There is no recent injury that triggered this pain.  His pain is consistent with left greater trochanteric bursitis, and he does have weakness of the left hip abductors.  He is neurologically intact and without any red flag symptoms.    Patient was last seen on February 13 of 2019.  At that time, he was complaining of acute left-sided low back pain with left radicular leg pain.  The symptoms resolved within a couple of weeks with stretching and exercises.    PSP:  Dr. Machuca       Plan:     A shared decision making plan was used.  The patient's values and choices were respected.  The following represents what was discussed and decided upon by the physician and the patient.      1.  DIAGNOSTIC TESTS: The patient's MRI of the lumbar spine from February 2019 was personally reviewed today by the physician with physician performing her own interpretation.  There is a lumbosacral scoliosis.  There are surgical changes seen at the L3-4 level.  There is significant facet arthropathy seen at the L3-4 facet joints.  There is left L3-4 and left L4-5 foraminal stenosis, though this is mild.  There is lateral recess stenosis at the L2-3, L3-4, L4-5 levels, but it is actually worse on the right side compared to the left.  -There is no need for updated imaging at this time of his lumbar spine.  -Given his pain was so consistent with greater trochanteric bursitis, I did not feel that there was any need  for imaging of the hip.  If he fails to have relief as expected, we could consider an x-ray of the hip and potentially progressed to an MRI if needed.  2.  PHYSICAL THERAPY: The patient was referred to physical therapy back in 2019.  He did complete physical therapy at that time.  I did offer him to return to physical therapy to work on hip abductor strengthening, but he declined stating he would rather do the exercises on his own at home which is reasonable.  3.  MEDICATIONS:    -He can continue to use diclofenac gel as needed.  -The patient can continue to take over-the-counter naproxen and Tylenol as needed for pain.  He is also taking supplements such as Boswella, turmeric.  4.  INTERVENTIONS: I did offer the patient a left greater trochanteric bursa injection today.  He did want to proceed.  The risks of the injection including infection, bleeding, temporarily worsening pain, damage to surrounding structures was discussed with the patient and he gave verbal consent to proceed and also signed the informed consent form.  A total of 40 mg of Depo-Medrol was used today.  Please see the procedure note below for details regarding this procedure.  -Second dose of the Covid vaccine was March 5 of 2021.  5.  PATIENT EDUCATION:    -I did explain the diagnosis of bursitis and how he has come to have bursitis.  I emphasized that the treatment for bursitis in addition to the injection decreasing the pain quickly, is to actually strengthen the hip abductor muscles.  -Did advise him to avoid submerging the injection site underwater.  However he can take a shower as soon as he gets home.  This on it is just fine for him to use.  -He is asked to monitor the injection site for any redness, drainage, swelling.  If he has any of these signs or experiences any fevers or chills, he is asked to call the clinic immediately or go to the nearest emergency room as this could be a sign of an infection.  -I did explain that it would likely  take at least a week for the injection to start working, with the peak effect coming at 2 weeks.  -I did show him the clamshell exercise in addition to the leg lift exercise and explained how he can make these exercises more difficult.  -All of his questions were answered to his satisfaction today.  He was in agreement the treatment plan.  6.  FOLLOW-UP: 2 weeks from today to evaluate his response to the injection..  If there are any questions/concerns or any significant worsening of pain prior to that time, the patient is asked to call the clinic via the nurse navigation line or via [employer].     Subjective:     Rasheed Hanson (AKA Wilber) is a 76 year old male who presents today for follow-up regarding left lateral hip pain.  The pain has been present for couple of months.  He reminds me that he fell 3 to 4 years ago onto his left hip and he wonders if this has made the side more susceptible to having pain.    Pain is just to the left side of his lateral hip.  He can extend more distally towards the knee but does not go below the knee.  Occasionally radiates into the left groin as well.  No symptoms on the right side.  He rates his pain today at a 3 out of 10.  At worst it is a 7 out of 10.  At best it is a 0 out of 10.  His pain is worse when he presses over the area.  Reports that laying on his left side is significantly painful.  Standing and walking for long periods of time will also aggravate it.  Reports that if he can limit his standing and walking and sit down, this will help with the pain.  Icing it is also been helpful.  He has been trying over-the-counter supplements such as Boswella, turmeric and diclofenac gel.  He also occasionally takes ibuprofen when the pain is really bad.  These are helpful.  He is wondering if he has bursitis and what else can be done for this.    The patient was last seen on February 13 of 2019.  At that time he was complaining of acute left-sided low back pain with left  radicular leg pain, but the symptoms resolved after a couple weeks with stretching and exercises.    Past medical history is reviewed and is unchanged for any new medical diagnoses in the interim.      Family history is reviewed and is unchanged in the interim.      Review of Systems:  Positive for numbness/tingling in the left leg.  Positive for headaches and pain that makes it difficult to sleep on his left side at night..  Pertinent negatives include no weakness, fevers, chills, unexplained weight loss, bowel incontinence, bladder incontinence, trips, stumbles, falls.  All others reviewed and are negative.     Objective:   CONSTITUTIONAL:  Vital signs as above.  No acute distress.  The patient is well nourished and well groomed.    PSYCHIATRIC:  The patient is awake, alert, oriented to person, place and time.  The patient is answering questions appropriately with clear speech.  Normal affect.  SKIN:  Skin over the face, posterior torso, bilateral upper and lower extremities is clean, dry, intact without rashes.  MUSCULOSKELETAL:  Gait is non-antalgic.  The patient is able to heel and toe walk without any difficulty.  He does have significant tenderness over the left greater trochanteric bursa.   He has 4/5 strength of the left hip abductors but 5/5 strength of the right hip abductors.  The patient has 5/5 strength for the bilateral hip flexors, knee flexors/extensors, ankle dorsiflexors/plantar flexors, ankle evertors/invertors.  He does have some mild pain with varus test bilaterally, with pain localizing to the ipsilateral groin.  No pain with bilateral hip scouring or hip internal and external rotation bilaterally.  NEUROLOGICAL: 2/4 patellar, medial hamstring, achilles reflexes which are symmetric bilaterally.  No ankle clonus bilaterally.  Sensation to light touch is intact in the bilateral L4, L5, and S1 dermatomes.       Pre-Procedure Diagnosis: Left Greater Trochanteric Bursitis  Post-Procedure Diagnosis:  Same    Procedure: Lft Greater Trochanteric Bursa injection    The risks and benefits of the procedure were discussed with the patient and he elected to proceed with the procedure and signed the informed consent form.     A time out was performed, verifying the patient, type of injection, and laterality.    The most tender area over the  left greater trochanter was marked by indenting the skin (no marker was used). The skin over this area was then cleansed with povidone scrubs x 3. Using a no touch technique, a 22 guage 3.5 inch needle was inserted down to bone and then withdrawn 2-3 mm. After aspiration was negative a 5  mL solution consisting of 1 mL of 40  mg of depomedrol mixed with 4  mL of 0.5% ropivacaine was injected. There was no resistance with injection.    There was no bleeding.  A bandaid was placed over the injection site.    He immediately noticed less tenderness to palpation over the left greater trochanteric bursa area.    Afterwards the patient was immediately able to stand of his own power and noted immediate relief. Advised the patient that he  may shower but should not submerge the left  hip area in water.  If he  has any redness/drainage/swelling over the injection site, or any fevers/chills, he  is asked to call the clinic immediately or go to the nearest Emergency room.            Again, thank you for allowing me to participate in the care of your patient.        Sincerely,        Trudy Munson DO

## 2021-11-03 NOTE — PATIENT INSTRUCTIONS
Herb    We did a left trochanteric bursa injection today. Please give the injection a good week to start working. It will take 2 weeks for the medication to reach its maximum effect.    Please monitor the injection site for any redness, drainage, swelling.  If you notice any of these symptoms/signs please call the clinic immediately or to go the nearest emergency room.  Please call if you have any questions/concerns following your injection.    Please perform the following strengthening exercises:  LEG LIFTS:  Lay on your left side (you will also perform this exercises lying on your right side).  Keep your toes pointed in front of you (do not have your toes pointed up towards the ceiling) and then slowly lift your right leg as high as you can.  Hold the leg at the highest point for 1 second and then slowly lower the leg down.  Perform 10-15 repetitions.  Perform 3 sets of this exercise.  Perform this exercise laying on your right side and lifting your left leg.  Eventually increase to 3 sets of 50 repetitions.  If you prefer to do less repetitions but still make this harder, you can put a band around her ankle or a weight on your ankle.  CLAM SHELLS:  Lay on your left side (you will also perform this exercise lying on your right side).  Bend your knees and hips to approximately 90 degrees.  Keep your ankles together.  Lift your right knee up as high as you can, keeping your right ankle touching your left ankle.  Hold your right knee up for 1 second and then slowly lower it back down.  Perform 10-15 repetitions.  Perform 3 sets of this exercise.  Perform this exercises laying on your right side and lifting your left leg.  Eventually increase to 3 sets of 50 repetitions.    If you keep your hip muscles strong, you can prevent your bursitis from returning.    I would like to follow-up with you in 2 weeks from your injection.  We can do this by video visit.  Or you are welcome to come in in person.

## 2022-02-15 DIAGNOSIS — R35.1 NOCTURIA ASSOCIATED WITH BENIGN PROSTATIC HYPERPLASIA: ICD-10-CM

## 2022-02-15 DIAGNOSIS — N40.1 NOCTURIA ASSOCIATED WITH BENIGN PROSTATIC HYPERPLASIA: ICD-10-CM

## 2022-02-15 NOTE — TELEPHONE ENCOUNTER
Pending Prescriptions:                       Disp   Refills    tamsulosin (FLOMAX) 0.4 MG capsule        90 cap*1            Sig: Take 1 capsule (0.4 mg) by mouth daily    Last visit 9/10/21  Last fill 9/10/21

## 2022-02-16 RX ORDER — TAMSULOSIN HYDROCHLORIDE 0.4 MG/1
0.4 CAPSULE ORAL DAILY
Qty: 90 CAPSULE | Refills: 1 | Status: SHIPPED | OUTPATIENT
Start: 2022-02-16 | End: 2022-10-20

## 2022-04-13 NOTE — PROGRESS NOTES
Assessment:   Rasheed Hanson is a 76 year old y.o. male with past medical history significant for hyperlipidemia, gout, BPH who presents today for follow-up regarding acute right low back and right lower extremity pain.  Pain began 6 or 7 days ago after trimming trees.  Pain follows a follows a right L5 pattern.  My review of an MRI lumbar spine from 2019 does show mild right lateral recess stenosis at L4-5.  Suspect this has progressed.  He is neurologically intact.    PSP: Dr. Machuca  Plan:     A shared decision making plan was used.  The patient's values and choices were respected.  The following represents what was discussed and decided upon by the physician assistant and the patient.      1.  DIAGNOSTIC TESTS: I reviewed the MRI lumbar spine from February 2019.  No further diagnostic tests were ordered.    2.  PHYSICAL THERAPY: Patient completed physical therapy in 2019.  He has been doing a home exercise program on a regular basis.  No further physical therapy was ordered.    3.  MEDICATIONS:    -I prescribed a Medrol Dosepak for the patient.  He should not take ibuprofen while he is taking the Medrol Dosepak.  He may resume ibuprofen after he completes an oral dose pack.  -He has never tried gabapentin.  If the steroid pack is not providing adequate relief we could add gabapentin 100 mg titrating up to 3 times daily.    4.  INTERVENTIONS: Patient may benefit from interventional pain management if he fails to improve with conservative measures.  Based on his pain today, I would likely try a right L5-S1 transforaminal epidural steroid injection.      5.  PATIENT EDUCATION: Patient is in agreement the above plan.  All questions were answered.    6.  FOLLOW-UP: Patient is going to follow-up with Dr. Machuca in 10 days.  If he has questions or concerns in the meantime, he should not hesitate to call.    Subjective:     Rasheed Hanson is a 76 year old male who presents today for follow-up regarding acute  right low back pain with radiation into the right lower extremity.  This is a patient of Dr. Blunt.  She last saw the patient November 3, 2021 for left lateral hip pain.  She performed a left trochanteric bursa injection on that date.  Patient reports his left hip pain has resolved.  Right-sided pain flared up 6 to 7 days ago after trimming trees.    Patient complains of right-sided low back pain.  Pain radiates into the right buttock.  He denies significant pain in the thigh but then he describes pain on the right lateral calf to the ankle.  Denies numbness, tingling, weakness down the legs.  He rates his pain today as a 7 out of 10.  At its worst it is a 9 out of 10.  At its best it is a 3 out of 10.  Pain is aggravated with prolonged standing.  Pain is alleviated with taking ibuprofen.    Patient did physical therapy most recently in 2019.  He states he does home exercises on a regular basis.  He is taking ibuprofen for her milligrams twice daily which is helpful and using Voltaren gel as needed which is helpful.     Review of Systems:   Negative for numbness/tingling, weakness, loss of bowel/bladder control, inability to urinate, headache, pain much worse at night, trip/stumble/falls, difficulty swallowing, difficulty with hand skills, fevers, unintentional weight loss.     Objective:   CONSTITUTIONAL:  Vital signs as above.  No acute distress.  The patient is well nourished and well groomed.    PSYCHIATRIC:  The patient is awake, alert, oriented to person, place and time.  The patient is answering questions appropriately with clear speech.  Normal affect.  HEENT: Normocephalic, atraumatic.  Sclera clear.    SKIN:  Skin over the face, posterior torso, bilateral upper and lower extremities is clean, dry, intact without rashes.  VASCULAR: No significant lower extremity edema.  MUSCULOSKELETAL:  Gait is mildly antalgic, favoring the right.  The patient is able to rise onto toes and heels bilaterally without  difficulty.  Mild tenderness over the right sacroiliac joint.  No significant tenderness palpation bilateral lower lumbar paraspinous muscles.     The patient has 5/5 strength for the bilateral hip flexors, knee flexors/extensors, ankle dorsiflexors/plantar flexors, ankle evertors/invertors.    NEUROLOGICAL: 2+ patellar, achilles reflexes which are symmetric bilaterally.  No ankle clonus bilaterally.  Sensation to light touch is intact in the bilateral L4, L5, and S1 dermatomes.       RESULTS: I reviewed the MRI lumbar spine from [facility] dated February 12, 2019.  This shows multilevel lumbar spondylosis in the setting of lumbosacral scoliosis.  At L1-L2 there is minimal left foraminal stenosis.  At L2-3 there is mild spinal canal stenosis and mild bilateral foraminal stenosis.  At L3-4 there is moderate spinal canal stenosis with moderate right and mild left foraminal stenosis.  At L4-5 there is mild to moderate right and mild left foraminal stenosis.  Please see report for further details.

## 2022-04-14 ENCOUNTER — OFFICE VISIT (OUTPATIENT)
Dept: PHYSICAL MEDICINE AND REHAB | Facility: CLINIC | Age: 77
End: 2022-04-14
Payer: COMMERCIAL

## 2022-04-14 VITALS
HEIGHT: 66 IN | HEART RATE: 56 BPM | SYSTOLIC BLOOD PRESSURE: 117 MMHG | WEIGHT: 162 LBS | DIASTOLIC BLOOD PRESSURE: 65 MMHG | BODY MASS INDEX: 26.03 KG/M2

## 2022-04-14 DIAGNOSIS — M54.16 LUMBAR RADICULAR PAIN: Primary | ICD-10-CM

## 2022-04-14 PROCEDURE — 99214 OFFICE O/P EST MOD 30 MIN: CPT | Performed by: PHYSICIAN ASSISTANT

## 2022-04-14 RX ORDER — METHYLPREDNISOLONE 4 MG
TABLET, DOSE PACK ORAL
Qty: 21 TABLET | Refills: 0 | Status: SHIPPED | OUTPATIENT
Start: 2022-04-14 | End: 2022-05-06

## 2022-04-14 ASSESSMENT — PAIN SCALES - GENERAL: PAINLEVEL: SEVERE PAIN (7)

## 2022-04-14 NOTE — PATIENT INSTRUCTIONS
Do not take ibuprofen while you are taking the medrol dose pack.    You can resume ibuprofen after you complete the medrol dose pack.    If you do not feel like the steroid is working well enough please call our nurse line at [phone].   We can add a nerve pain medication called gabapentin.

## 2022-04-14 NOTE — LETTER
4/14/2022         RE: Rasheed Hanson  [address]  [place] [address]        Dear Colleague,    Thank you for referring your patient, Rasheed Hanson, to the [facility]. Please see a copy of my visit note below.    Assessment:   Rasheed Hanson is a 76 year old y.o. male with past medical history significant for hyperlipidemia, gout, BPH who presents today for follow-up regarding acute right low back and right lower extremity pain.  Pain began 6 or 7 days ago after trimming trees.  Pain follows a follows a right L5 pattern.  My review of an MRI lumbar spine from 2019 does show mild right lateral recess stenosis at L4-5.  Suspect this has progressed.  He is neurologically intact.    PSP: Dr. Machuca  Plan:     A shared decision making plan was used.  The patient's values and choices were respected.  The following represents what was discussed and decided upon by the physician assistant and the patient.      1.  DIAGNOSTIC TESTS: I reviewed the MRI lumbar spine from February 2019.  No further diagnostic tests were ordered.    2.  PHYSICAL THERAPY: Patient completed physical therapy in 2019.  He has been doing a home exercise program on a regular basis.  No further physical therapy was ordered.    3.  MEDICATIONS:    -I prescribed a Medrol Dosepak for the patient.  He should not take ibuprofen while he is taking the Medrol Dosepak.  He may resume ibuprofen after he completes an oral dose pack.  -He has never tried gabapentin.  If the steroid pack is not providing adequate relief we could add gabapentin 100 mg titrating up to 3 times daily.    4.  INTERVENTIONS: Patient may benefit from interventional pain management if he fails to improve with conservative measures.  Based on his pain today, I would likely try a right L5-S1 transforaminal epidural steroid injection.      5.  PATIENT EDUCATION: Patient is in agreement the above plan.  All questions were answered.    6.   FOLLOW-UP: Patient is going to follow-up with Dr. Machuca in 10 days.  If he has questions or concerns in the meantime, he should not hesitate to call.    Subjective:     Rasheed Hanson is a 76 year old male who presents today for follow-up regarding acute right low back pain with radiation into the right lower extremity.  This is a patient of Dr. Machuca's.  She last saw the patient November 3, 2021 for left lateral hip pain.  She performed a left trochanteric bursa injection on that date.  Patient reports his left hip pain has resolved.  Right-sided pain flared up 6 to 7 days ago after trimming trees.    Patient complains of right-sided low back pain.  Pain radiates into the right buttock.  He denies significant pain in the thigh but then he describes pain on the right lateral calf to the ankle.  Denies numbness, tingling, weakness down the legs.  He rates his pain today as a 7 out of 10.  At its worst it is a 9 out of 10.  At its best it is a 3 out of 10.  Pain is aggravated with prolonged standing.  Pain is alleviated with taking ibuprofen.    Patient did physical therapy most recently in 2019.  He states he does home exercises on a regular basis.  He is taking ibuprofen for her milligrams twice daily which is helpful and using Voltaren gel as needed which is helpful.     Review of Systems:   Negative for numbness/tingling, weakness, loss of bowel/bladder control, inability to urinate, headache, pain much worse at night, trip/stumble/falls, difficulty swallowing, difficulty with hand skills, fevers, unintentional weight loss.     Objective:   CONSTITUTIONAL:  Vital signs as above.  No acute distress.  The patient is well nourished and well groomed.    PSYCHIATRIC:  The patient is awake, alert, oriented to person, place and time.  The patient is answering questions appropriately with clear speech.  Normal affect.  HEENT: Normocephalic, atraumatic.  Sclera clear.    SKIN:  Skin over the face, posterior torso,  bilateral upper and lower extremities is clean, dry, intact without rashes.  VASCULAR: No significant lower extremity edema.  MUSCULOSKELETAL:  Gait is mildly antalgic, favoring the right.  The patient is able to rise onto toes and heels bilaterally without difficulty.  Mild tenderness over the right sacroiliac joint.  No significant tenderness palpation bilateral lower lumbar paraspinous muscles.     The patient has 5/5 strength for the bilateral hip flexors, knee flexors/extensors, ankle dorsiflexors/plantar flexors, ankle evertors/invertors.    NEUROLOGICAL: 2+ patellar, achilles reflexes which are symmetric bilaterally.  No ankle clonus bilaterally.  Sensation to light touch is intact in the bilateral L4, L5, and S1 dermatomes.       RESULTS: I reviewed the MRI lumbar spine from [facility] dated February 12, 2019.  This shows multilevel lumbar spondylosis in the setting of lumbosacral scoliosis.  At L1-L2 there is minimal left foraminal stenosis.  At L2-3 there is mild spinal canal stenosis and mild bilateral foraminal stenosis.  At L3-4 there is moderate spinal canal stenosis with moderate right and mild left foraminal stenosis.  At L4-5 there is mild to moderate right and mild left foraminal stenosis.  Please see report for further details.          Again, thank you for allowing me to participate in the care of your patient.        Sincerely,        Connie Juárez PA-C

## 2022-04-22 ENCOUNTER — TELEPHONE (OUTPATIENT)
Dept: PHYSICAL MEDICINE AND REHAB | Facility: CLINIC | Age: 77
End: 2022-04-22
Payer: COMMERCIAL

## 2022-04-22 DIAGNOSIS — M54.16 LUMBAR RADICULAR PAIN: Primary | ICD-10-CM

## 2022-04-22 NOTE — TELEPHONE ENCOUNTER
Call placed to pt with provider's response. Radiology # given for patient to call and schedule.     Cancelled follow-up appointment for Monday at patient's request.

## 2022-04-22 NOTE — TELEPHONE ENCOUNTER
PSP:  Dr. Machuca  Last clinic visit:  4/14/2022 with Connie CHICAS  Reason for call: Next steps in treatment plan  Clinical information:  Call received from pt. Pt is scheduled for follow-up appointment with Dr. Machuca on 4/25. Pt states he did not get any relief with the prescribed medrol dose vanessa and he inquires about an updated MRI to determine where an injection would be as the next step.   He does not feel a follow-up appointment would be necessary as he already knows what the next steps would likely be.   Advice given to patient: Pt is aware that Dr. Machuca is out of clinic today. Will call patient back with a response.   Provider to address: Updated MRI for injection request

## 2022-04-29 ENCOUNTER — HOSPITAL ENCOUNTER (OUTPATIENT)
Dept: MRI IMAGING | Facility: HOSPITAL | Age: 77
Discharge: HOME | End: 2022-04-29
Attending: PHYSICIAN ASSISTANT | Admitting: PHYSICIAN ASSISTANT
Payer: COMMERCIAL

## 2022-04-29 DIAGNOSIS — M54.16 LUMBAR RADICULAR PAIN: ICD-10-CM

## 2022-04-29 PROCEDURE — 72148 MRI LUMBAR SPINE W/O DYE: CPT

## 2022-05-03 ENCOUNTER — TELEPHONE (OUTPATIENT)
Dept: PHYSICAL MEDICINE AND REHAB | Facility: CLINIC | Age: 77
End: 2022-05-03
Payer: COMMERCIAL

## 2022-05-03 NOTE — TELEPHONE ENCOUNTER
Pt left voicemail on nurse navigation line inquiring about results. [place] to reach him is [phone].     Call placed to pt at this number. Left message to return call.

## 2022-05-03 NOTE — TELEPHONE ENCOUNTER
----- Message from Connie Juárez PA-C sent at 5/3/2022  7:17 AM CDT -----  Please call this patient and let him know that I reviewed his MRI.  There is severe narrowing around a nerve as it exits out of the spine on the right at L4/5.  This patient should follow up to review the MRI.  He can schedule a follow up with Dr. Machuca (PSP) or me if he prefers.

## 2022-05-04 NOTE — TELEPHONE ENCOUNTER
Patient returned call. Results given. Explained he should return to review the MRI and treatment plan with PSP or can be seen per Connie Juárez PA-C. Patient wanted to be seen per PSP; transferred to scheduling to make the follow up appointment.

## 2022-05-05 NOTE — PROGRESS NOTES
Assessment:   Rasheed Hanson (Wilber)  is a 76 year old y.o. male with past medical history significant for GERD, gout, BPH who presents today for follow-up regarding acute right-sided low back pain with right radicular leg pain that started approximately 1 month ago.  His updated MRI shows Severe right L4-5 foraminal stenosis secondary to his degenerative changes and scoliosis, which is likely the cause of his pain.  There is a spondylolisthesis which causes lateral recess stenosis and that can be impinging on the L5 nerve as well.  He is neurologically intact and without any red flag symptoms.     PSP:  Dr. Machuca       Plan:     A shared decision making plan was used.  The patient's values and choices were respected.  The following represents what was discussed and decided upon by the physician and the patient.      1.  DIAGNOSTIC TESTS: The patient's MRI of the lumbar spine from April 29, 2022 was personally reviewed today by performing her own interpretation.  The patient does have a significant L4-5 spondylolisthesis which causes significant central canal stenosis, lateral recess stenosis, and severe right L4-5 foraminal stenosis.  There is likely right L5 nerve root impingement from the lateral recess stenosis.  There are degenerative changes as affected region.  The images were shown to the patient and the findings were explained using a spine model.   2.  PHYSICAL THERAPY: He last completed physical therapy in 2019.  He has been doing a home exercise program on a regular basis.  I did reoffer him physical therapy at this time.  He was reluctant at first, but I explained how they can teach him nerve glide/flossing exercises that may be more beneficial for him than his previous back exercises, he was willing to go.  He would like to go to a place where he is already established in [place], and I told him he is welcome to take the order wherever he would like to go.  3.  MEDICATIONS:    The  patient has completed a Medrol Dosepak.  He reports that it was helpful while he was taking it but after he discontinued it he no longer had further relief.  -Discussed Gabapentin/Neurontin (nerve pain medication) mechanism of action as well as potential side effects (drowsiness/dizziness) with the patient.  The patient wanted to try this medication so Gabapentin 300 mg was prescribed.  A chart was given with how to increase the dose to a maximum of 3 tablets three times a day.  The lowest therapeutic dose should be used.   The patient is asked to call the clinic if there are any side effects to the Gabapentin or if questions arise as to how to increase the dose.  If he does have side effects, I would recommending decreasing to gabapentin 100 mg capsules and then he can increase up to a maximum of 3 capsules 3 times a day (300 mg 3 times a day) to start.  4.  INTERVENTIONS: I did offer him a right L4-5 transforaminal epidural steroid injection.  I did consider a right L4-5 and L5-S1 TFESI, but given the foraminal stenosis is so severe at the L4-5 level, I think it is reasonable to start with just a single level injection.  If he fails to have significant relief, then we can perform a separate right L5-S1 TFESI  5.  PATIENT EDUCATION:    -Patient was interested in seeing a surgeon.  I explained that with his scoliosis, he would most likely need a fusion surgery.  He states that he is not interested in a fusion surgery and only wants a decompression.  I explained that I am not sure if the surgeon would offer him just a decompression surgery but he is welcome to have a surgery consultation if he would like.  However I recommended that he maxed out all conservative treatment options before considering surgery.  I discussed that he should return to physical therapy, he should try gabapentin, and he should try the epidural steroid injection before considering surgery  -All of his questions were answered to his  satisfaction today.  6.  FOLLOW-UP: Patient will follow up in 1 week for the right L4-5 TFESI and that I would like to see him back 2 weeks after that to evaluate his status/response to the injection.  If there are any questions/concerns or any significant worsening of pain prior to that time, the patient is asked to call the clinic via the nurse navigation line or via [employer].     Subjective:     Rasheed Hanson (AKA Wilber) is a 76 year old male who presents today for follow-up regarding acute flareup of right-sided low back pain and right radicular/sciatic type leg symptoms.  The patient was recently seen by my colleague who placed him on a Medrol Dosepak.  He reports that his symptoms were better while he was taking Advil when he stopped taking it the pain seems to come back.  He has been managing with ibuprofen.  Reports that the pain is mild today.  When does get the pain it is located in the right low back and then it radiates down the right leg going into the lateral lower leg and into the lateral aspect of the ankle/foot.  He denies any numbness or tingling, but he does feel like he has weakness but thinks it is more secondary to the amount of pain that he has.  He is rating his pain today at a 1 out of 10.  At worst it is a 9 out of 10.  At best is a 1 out of 10.  His pain is worse with movement, particularly with twisting.  He has found relief with ibuprofen.  He is not doing physical therapy, but states that he is very active and doing the exercises on his own.  Other than the ibuprofen he only takes glucosamine.  He is wondering what the next steps are and is particularly interested in surgery as he feels that this would be the only way to fix the stenosis.    Past medical history is reviewed and is unchanged for any new medical diagnoses in the interim.      Family history is reviewed and is unchanged in the interim.      Review of Systems:  Positive for sensation of weakness in the right leg..   Pertinent negatives include no numbness, tingling, headaches, fevers, chills, unexplained weight loss, bowel incontinence, bladder incontinence, trips, stumbles, falls.  All others reviewed and are negative.     Objective:   CONSTITUTIONAL:  Vital signs as above.  No acute distress.  The patient is well nourished and well groomed.    PSYCHIATRIC:  The patient is awake, alert, oriented to person, place and time.  The patient is answering questions appropriately with clear speech.  Normal affect.  SKIN:  Skin over the face, posterior torso, bilateral upper and lower extremities is clean, dry, intact without rashes.  MUSCULOSKELETAL:  Gait is non-antalgic.  The patient is able to heel and toe walk without any difficulty.  No significant tenderness over the bilateral lumbar paraspinal muscles.     The patient has 5/5 strength for the bilateral hip flexors, knee flexors/extensors, ankle dorsiflexors/plantar flexors, ankle evertors/invertors.    NEUROLOGICAL: 2/4 patellar, with trace medial hamstring, 1/4 achilles reflexes which are symmetric bilaterally.  No ankle clonus bilaterally.  Sensation to light touch is intact in the bilateral L4, L5, and S1 dermatomes.

## 2022-05-06 ENCOUNTER — OFFICE VISIT (OUTPATIENT)
Dept: PHYSICAL MEDICINE AND REHAB | Facility: CLINIC | Age: 77
End: 2022-05-06
Payer: COMMERCIAL

## 2022-05-06 VITALS — SYSTOLIC BLOOD PRESSURE: 119 MMHG | HEART RATE: 57 BPM | OXYGEN SATURATION: 99 % | DIASTOLIC BLOOD PRESSURE: 64 MMHG

## 2022-05-06 DIAGNOSIS — M54.16 LUMBAR RADICULAR PAIN: Primary | ICD-10-CM

## 2022-05-06 DIAGNOSIS — M48.061 LUMBAR FORAMINAL STENOSIS: ICD-10-CM

## 2022-05-06 PROCEDURE — 99214 OFFICE O/P EST MOD 30 MIN: CPT | Performed by: PHYSICAL MEDICINE & REHABILITATION

## 2022-05-06 RX ORDER — IBUPROFEN 200 MG
400 TABLET ORAL 2 TIMES DAILY PRN
COMMUNITY
End: 2023-12-28

## 2022-05-06 RX ORDER — GABAPENTIN 300 MG/1
CAPSULE ORAL
Qty: 270 CAPSULE | Refills: 2 | Status: SHIPPED | OUTPATIENT
Start: 2022-05-06 | End: 2022-07-06 | Stop reason: SINTOL

## 2022-05-06 ASSESSMENT — PAIN SCALES - GENERAL: PAINLEVEL: NO PAIN (1)

## 2022-05-06 NOTE — PATIENT INSTRUCTIONS
Herb,    Your MRI shows that you have significant right L4-5 foraminal stenosis which I believe is the cause of your back and leg pain.    I do think that you would benefit from going to physical therapy to work on specific exercises for the foraminal stenosis and the nerve root impingement.  There are exercises called nerve glide/flossing exercises that can help with the pain going down your leg.    Please continue to do your other exercises.  You have been doing a great job with doing the exercises on a regular basis.    Gabapentin (nerve pain medication) was prescribed today.  Please use the chart to increase the dose to an amount that controls your pain (do not exceed 3 tablets three times a day).  If you have any questions on how to increase the dose or any side effects to this medication, please call the clinic.    Gabapentin 300mg Dosing Chart    DATE  MORNING AFTERNOON BEDTIME    Day 1 0 0 1    Day 2 0 0 1    Day 3 0 0 1    Day 4 1 0 1    Day 5 1 0 1    Day 6 1 0 1    Day 7 1 1 1    Day 8 1 1 1    Day 9 1 1 1    Day 10 1 1 2    Day 11 1 1 2    Day 12 1 1 2    Day 13 2 1 2    Day 14 2 1 2    Day 15 2 1 2    Day 16 2 2 2    Day 17 2 2 2    Day 18 2 2 2    Day 19 2 2 3    Day 20 2 2 3    Day 21 2 2 3    Day 22 3 2 3    Day 23 3 2 3    Day 24 3 2 3    Day 25 3 3 3    Day 26 3 3 3    Day 27 3 3 3     Continue medication, taking 3 capsules three times daily    Please call if you have any questions regarding how to take your medication  Clinic Phone # [phone]       A right L4-5 transforaminal epidural steroid injection has been ordered today.      Please note that this injection uses cortisone.  The cortisone may somewhat weaken the immune system.  It is unknown how much the immune system is weakened.  It is unknown if it is weakened to the point that you may be more likely to get the COVID-19 virus, or if you do get the COVID-19 virus, if you would be sicker than you would have been if you had not had the  cortisone injection.  If you do not wish to proceed with the injection, please let the nurse/physician know and do NOT schedule the injection.    Please note that since your immune system is weakened from the cortisone, having the FLU or COVID-19 vaccine/shot may be less effective if you have a vaccine within 2 weeks from your cortisone injection.  It is advised to wait 2 weeks after your cortisone injection to have the  FLU or COVID-19 vaccine/shot (or if you have the vaccine/shot first, wait 2 weeks before you have the cortisone injection).    Please schedule this injection at least 1 week  from now to allow time for insurance prior authorization.  On the day of your injection, you cannot be sick or taking antibiotics.  If you become sick and antibiotics are prescribed, please call the clinic so your injection can be rescheduled for once you have completed your antibiotics.  You will need to bring a  with you for your injection.   If you have any questions or concerns prior to your injection, please do not hesitate to call the nurse navigation line at [phone] or contact me through [employer].    Thanks, Herb!  Dr. Machuca

## 2022-05-06 NOTE — LETTER
5/6/2022         RE: Rasheed Hanson  [address]  [facility] [address]        Dear Colleague,    Thank you for referring your patient, Rasheed Hanson, to the [facility]. Please see a copy of my visit note below.    Assessment:   Rasheed Hanson (Wilber)  is a 76 year old y.o. male with past medical history significant for GERD, gout, BPH who presents today for follow-up regarding acute right-sided low back pain with right radicular leg pain that started approximately 1 month ago.  His updated MRI shows Severe right L4-5 foraminal stenosis secondary to his degenerative changes and scoliosis, which is likely the cause of his pain.  There is a spondylolisthesis which causes lateral recess stenosis and that can be impinging on the L5 nerve as well.  He is neurologically intact and without any red flag symptoms.     PSP:  Dr. Machuca       Plan:     A shared decision making plan was used.  The patient's values and choices were respected.  The following represents what was discussed and decided upon by the physician and the patient.      1.  DIAGNOSTIC TESTS: The patient's MRI of the lumbar spine from April 29, 2022 was personally reviewed today by performing her own interpretation.  The patient does have a significant L4-5 spondylolisthesis which causes significant central canal stenosis, lateral recess stenosis, and severe right L4-5 foraminal stenosis.  There is likely right L5 nerve root impingement from the lateral recess stenosis.  There are degenerative changes as affected region.  The images were shown to the patient and the findings were explained using a spine model.   2.  PHYSICAL THERAPY: He last completed physical therapy in 2019.  He has been doing a home exercise program on a regular basis.  I did reoffer him physical therapy at this time.  He was reluctant at first, but I explained how they can teach him nerve glide/flossing exercises that may be more beneficial  for him than his previous back exercises, he was willing to go.  He would like to go to a place where he is already established in [place], and I told him he is welcome to take the order wherever he would like to go.  3.  MEDICATIONS:    The patient has completed a Medrol Dosepak.  He reports that it was helpful while he was taking it but after he discontinued it he no longer had further relief.  -Discussed Gabapentin/Neurontin (nerve pain medication) mechanism of action as well as potential side effects (drowsiness/dizziness) with the patient.  The patient wanted to try this medication so Gabapentin 300 mg was prescribed.  A chart was given with how to increase the dose to a maximum of 3 tablets three times a day.  The lowest therapeutic dose should be used.   The patient is asked to call the clinic if there are any side effects to the Gabapentin or if questions arise as to how to increase the dose.  If he does have side effects, I would recommending decreasing to gabapentin 100 mg capsules and then he can increase up to a maximum of 3 capsules 3 times a day (300 mg 3 times a day) to start.  4.  INTERVENTIONS: I did offer him a right L4-5 transforaminal epidural steroid injection.  I did consider a right L4-5 and L5-S1 TFESI, but given the foraminal stenosis is so severe at the L4-5 level, I think it is reasonable to start with just a single level injection.  If he fails to have significant relief, then we can perform a separate right L5-S1 TFESI  5.  PATIENT EDUCATION:    -Patient was interested in seeing a surgeon.  I explained that with his scoliosis, he would most likely need a fusion surgery.  He states that he is not interested in a fusion surgery and only wants a decompression.  I explained that I am not sure if the surgeon would offer him just a decompression surgery but he is welcome to have a surgery consultation if he would like.  However I recommended that he maxed out all conservative  treatment options before considering surgery.  I discussed that he should return to physical therapy, he should try gabapentin, and he should try the epidural steroid injection before considering surgery  -All of his questions were answered to his satisfaction today.  6.  FOLLOW-UP: Patient will follow up in 1 week for the right L4-5 TFESI and that I would like to see him back 2 weeks after that to evaluate his status/response to the injection.  If there are any questions/concerns or any significant worsening of pain prior to that time, the patient is asked to call the clinic via the nurse navigation line or via [employer].     Subjective:     Rasheed Hanson (AKA Wilber) is a 76 year old male who presents today for follow-up regarding acute flareup of right-sided low back pain and right radicular/sciatic type leg symptoms.  The patient was recently seen by my colleague who placed him on a Medrol Dosepak.  He reports that his symptoms were better while he was taking Advil when he stopped taking it the pain seems to come back.  He has been managing with ibuprofen.  Reports that the pain is mild today.  When does get the pain it is located in the right low back and then it radiates down the right leg going into the lateral lower leg and into the lateral aspect of the ankle/foot.  He denies any numbness or tingling, but he does feel like he has weakness but thinks it is more secondary to the amount of pain that he has.  He is rating his pain today at a 1 out of 10.  At worst it is a 9 out of 10.  At best is a 1 out of 10.  His pain is worse with movement, particularly with twisting.  He has found relief with ibuprofen.  He is not doing physical therapy, but states that he is very active and doing the exercises on his own.  Other than the ibuprofen he only takes glucosamine.  He is wondering what the next steps are and is particularly interested in surgery as he feels that this would be the only way to fix the  stenosis.    Past medical history is reviewed and is unchanged for any new medical diagnoses in the interim.      Family history is reviewed and is unchanged in the interim.      Review of Systems:  Positive for sensation of weakness in the right leg..  Pertinent negatives include no numbness, tingling, headaches, fevers, chills, unexplained weight loss, bowel incontinence, bladder incontinence, trips, stumbles, falls.  All others reviewed and are negative.     Objective:   CONSTITUTIONAL:  Vital signs as above.  No acute distress.  The patient is well nourished and well groomed.    PSYCHIATRIC:  The patient is awake, alert, oriented to person, place and time.  The patient is answering questions appropriately with clear speech.  Normal affect.  SKIN:  Skin over the face, posterior torso, bilateral upper and lower extremities is clean, dry, intact without rashes.  MUSCULOSKELETAL:  Gait is non-antalgic.  The patient is able to heel and toe walk without any difficulty.  No significant tenderness over the bilateral lumbar paraspinal muscles.     The patient has 5/5 strength for the bilateral hip flexors, knee flexors/extensors, ankle dorsiflexors/plantar flexors, ankle evertors/invertors.    NEUROLOGICAL: 2/4 patellar, with trace medial hamstring, 1/4 achilles reflexes which are symmetric bilaterally.  No ankle clonus bilaterally.  Sensation to light touch is intact in the bilateral L4, L5, and S1 dermatomes.               Again, thank you for allowing me to participate in the care of your patient.        Sincerely,        Trudy Munson DO

## 2022-05-13 ENCOUNTER — RADIOLOGY INJECTION OFFICE VISIT (OUTPATIENT)
Dept: PHYSICAL MEDICINE AND REHAB | Facility: CLINIC | Age: 77
End: 2022-05-13
Attending: PHYSICAL MEDICINE & REHABILITATION
Payer: COMMERCIAL

## 2022-05-13 VITALS
RESPIRATION RATE: 16 BRPM | HEART RATE: 54 BPM | OXYGEN SATURATION: 98 % | DIASTOLIC BLOOD PRESSURE: 66 MMHG | SYSTOLIC BLOOD PRESSURE: 128 MMHG | TEMPERATURE: 98.3 F

## 2022-05-13 DIAGNOSIS — M48.061 LUMBAR FORAMINAL STENOSIS: ICD-10-CM

## 2022-05-13 DIAGNOSIS — M54.16 LUMBAR RADICULAR PAIN: ICD-10-CM

## 2022-05-13 PROCEDURE — 64483 NJX AA&/STRD TFRM EPI L/S 1: CPT | Mod: RT | Performed by: PHYSICAL MEDICINE & REHABILITATION

## 2022-05-13 RX ORDER — METHYLPREDNISOLONE ACETATE 80 MG/ML
INJECTION, SUSPENSION INTRA-ARTICULAR; INTRALESIONAL; INTRAMUSCULAR; SOFT TISSUE
Status: COMPLETED | OUTPATIENT
Start: 2022-05-13 | End: 2022-05-13

## 2022-05-13 RX ORDER — LIDOCAINE HYDROCHLORIDE 10 MG/ML
INJECTION, SOLUTION EPIDURAL; INFILTRATION; INTRACAUDAL; PERINEURAL
Status: COMPLETED | OUTPATIENT
Start: 2022-05-13 | End: 2022-05-13

## 2022-05-13 RX ADMIN — LIDOCAINE HYDROCHLORIDE 2 ML: 10 INJECTION, SOLUTION EPIDURAL; INFILTRATION; INTRACAUDAL; PERINEURAL at 09:15

## 2022-05-13 RX ADMIN — METHYLPREDNISOLONE ACETATE 80 MG: 80 INJECTION, SUSPENSION INTRA-ARTICULAR; INTRALESIONAL; INTRAMUSCULAR; SOFT TISSUE at 09:15

## 2022-05-13 ASSESSMENT — PAIN SCALES - GENERAL
PAINLEVEL: NO PAIN (1)
PAINLEVEL: NO PAIN (0)

## 2022-05-13 NOTE — PATIENT INSTRUCTIONS
Follow-up visit with Dr. Machuca in 2 weeks to discuss injection outcome and determine care plan going forward.    Please schedule the right L5-S1 epidural steroid injection a few days after the follow-up visit in the event that injection is needed prior to your upcoming trip.       DISCHARGE INSTRUCTIONS    During office hours (8:00 a.m.- 4:00 p.m.) questions or concerns may be answered  by calling [facility] Navigation Nurses at  [phone].  Messages received after hours will be returned the following business day.      In the case of an emergency, please dial 911 or seek assistance at the nearest Emergency Room/Urgent Care facility.     All Patients:    You may experience an increase in your symptoms for the first 2 days (It may take anywhere between 2 days- 2 weeks for the steroid to have maximum effect).    You may use ice on the injection site, as frequently as 20 minutes each hour if needed.    You may take your pain medicine.    You may continue taking your regular medication after your injection. If you have had a Medial Branch Block you may resume pain medication once your pain diary is completed.    You may shower. No swimming, tub bath or hot tub for 48 hours.  You may remove your bandaid/bandage as soon as you are home.    You may resume light activities, as tolerated.    Resume your usual diet as tolerated.    It is strongly advised that you do not drive for 1-3 hours post injection.    If you have had oral sedation:  Do not drive for 8 hours post injection.      If you have had IV sedation:  Do not drive for 24 hours post injection.  Do not operate hazardous machinery or make important personal/business decisions for 24 hours.      POSSIBLE STEROID SIDE EFFECTS (If steroid/cortisone was used for your procedure)    -If you experience these symptoms, it should only last for a short period    Swelling of the legs              Skin redness (flushing)     Mouth (oral) irritation   Blood sugar  (glucose) levels            Sweats                    Mood changes  Headache  Sleeplessness  Weakened immune system for up to 14 days, which could increase the risk of yasmine the COVID-19 virus and/or experiencing more severe symptoms of the disease, if exposed.  Decreased effectiveness of the flu vaccine if given within 2 weeks of the steroid.         POSSIBLE PROCEDURE SIDE EFFECTS  -Call the Spine Center if you are concerned  Increased Pain           Increased numbness/tingling      Nausea/Vomiting          Bruising/bleeding at site      Redness or swelling                                              Difficulty walking      Weakness           Fever greater than 100.5    *In the event of a severe headache after an epidural steroid injection that is relieved by lying down, please call the [facility] to speak with a clinical staff member*

## 2022-05-24 NOTE — PROGRESS NOTES
Assessment:   Rasheed Hanson (AKA Wilber)  is a 77 year old y.o. male with past medical history significant for GERD, gout, BPH who presents today for follow-up regarding acute right-sided low back pain with right radicular leg pain that started approximately 1 month ago.  His MRI shows severe right L4-5 foraminal stenosis secondary to degenerative changes and scoliosis as well as a spondylolisthesis and lateral recess stenosis.  There does appear to be impingement of the right L5 nerve.  He is most recently status post a right L4-5 TFESI on May 13, 2022.  At this time, he is reporting approximately 85% relief of his pain following this injection he remains neurologically intact and without any red flag symptoms    PSP:  Dr. Machuca       Plan:     A shared decision making plan was used.  The patient's values and choices were respected.  The following represents what was discussed and decided upon by the physician and the patient.      1.  DIAGNOSTIC TESTS: The patient's MRI of the lumbar spine from April 29, 2022 was personally reviewed today by the physician with physician performing her own interpretation.  The patient does have a significant L4-5 spondylolisthesis which causes significant central canal stenosis, lateral recess stenosis and severe right L4-5 foraminal stenosis.  There is likely right L5 nerve root impingement from the lateral recess stenosis.  There are degenerative changes throughout.  -No further diagnostic testing necessary at this time.  2.  PHYSICAL THERAPY: At the last visit, he was given an order for physical therapy.  At this time he has not yet scheduled physical therapy, or at least not scheduled within the system yet.  He may be waiting until he returns from his trip.  I did send him a [employer] message to check on this.  -He previously completed physical therapy in 2019 and has been diligent in doing a home exercise program from that course of physical therapy.  3.  MEDICATIONS:    -At  the last visit he was prescribed gabapentin 300 mg and asked to titrate up to a maximum of 900 mg 3 times a day.  At this time, he is having some slight side effects.  I did prescribe him gabapentin 100 mg capsules.  He can try taking 1 to 3 capsules at bedtime.  He can add 1 to 3 capsules in the morning and/or afternoon.  I am hoping that the lower dose will help provide some pain relief but not cause the drowsiness.  A PDMP check was run today and he is deemed appropriate for continued gabapentin use.  -I did prescribe a Medrol Dosepak for him to take on his trip to [place] just in case he has a flare of pain while he is up there.  He should not take it if his pain remains manageable.  -His prescriptions were initially sent to [place] so they had to be resent to his preferred pharmacy and [place].  -He failed to have long-lasting relief with a Medrol Dosepak.  4.  INTERVENTIONS   -He is scheduled for right L5-S1 transforaminal epidural steroid injection on June 1, 2022.  At this time given that he is doing better I would recommend postponing the injection.  He is nervous that he may have pain before his trip so.  He reports that he will be leaving on June 8 to go to a [place] before he goes on [place] [employer] [place] trip.  I did explain that we can schedule it for June 7, 2022.  If he is doing better at that time he can cancel it, as I do not want to do this injection for prevention.  We should only do it if he is having pain that would limit him from being able to be active and enjoy his trip.  He verbalized understanding and will reschedule it for June 7, 2022.  -The patient is status post a right L4-5 transforaminal epidural steroid injection on May 13, 2022.  At this time he was reporting 85% relief.  5.  PATIENT EDUCATION:    -I did explain that he may need to have a repeat injection at some point down the road but I am hoping that he will have long-lasting relief.  -All of his questions were answered to his  satisfaction today.  He was in agreement the treatment plan  6.  FOLLOW-UP: Patient can schedule the right L5-S1 TFESI on June 7, 2022.  We can cancel it if he is still doing better if there are any questions/concerns or any significant worsening of pain prior to that time, the patient is asked to call the clinic via the nurse navigation line or via [employer].     A total of 31 minutes was spent in the care of this patient on the date of service.    Subjective:     Rasheed Hanson (AKA Wilber)  is a 77 year old male who presents today for follow-up regarding acute flareup of right-sided low back pain and right radicular/sciatic type leg symptoms.  The patient reports that he had approximately 85% relief with the right L4-5 transforaminal epidural steroid injection performed on May 13, 2022.  He says that he still gets some slight twinges in his right calf, but overall things seem to be better.  He is starting to notice a little bit of pain in the left low back and left buttock that wraps around into the left groin but nothing that is overly bothersome to him.  He rates his pain today at a 1 out of 10.  At worst it is a 2 out of 10.  At best is a 0 out of 10.  He reports that any use of his abs or when he bears down such as having a bowel movement will aggravate the pain slightly.  He does feel better with stretching.  He is taking gabapentin at bedtime but he wakes up in the morning groggy with the 300 mg capsule.  He has been hesitant to take it during the day.  Other than the gabapentin he is taking ibuprofen occasionally.  He is wondering if she should have the other injection or not at this point given that he feels significantly better    Past medical history is reviewed and is unchanged for any new medical diagnoses in the interim.      Family history is reviewed and is unchanged in the interim.      Review of Systems:  Positive for numbness and tingling in the right distal lower leg.  Positive for occasional  headaches.  Pertinent negatives include no weakness.  Fevers, chills, unexplained weight loss, bowel incontinence, bladder incontinence, trips, stumbles, falls.  All others reviewed and are negative.     Objective:   CONSTITUTIONAL:  Vital signs as above.  No acute distress.  The patient is well nourished and well groomed.    PSYCHIATRIC:  The patient is awake, alert, oriented to person, place and time.  The patient is answering questions appropriately with clear speech.  Normal affect.  SKIN:  Skin over the face, posterior torso, bilateral upper and lower extremities is clean, dry, intact without rashes.  MUSCULOSKELETAL:  Gait is non-antalgic.  The patient is able to heel and toe walk without any difficulty.  No significant tenderness over the bilateral lumbar paraspinal muscles.      The patient has 5/5 strength for the bilateral hip flexors, knee flexors/extensors, ankle dorsiflexors/plantar flexors, ankle evertors/invertors.    NEUROLOGICAL: 1/4 patellar, medial hamstring, achilles reflexes which are symmetric bilaterally.  No ankle clonus bilaterally.  Sensation to light touch is intact in the bilateral L4, L5, and S1 dermatomes.

## 2022-05-25 ENCOUNTER — OFFICE VISIT (OUTPATIENT)
Dept: PHYSICAL MEDICINE AND REHAB | Facility: CLINIC | Age: 77
End: 2022-05-25
Payer: COMMERCIAL

## 2022-05-25 VITALS — TEMPERATURE: 98.2 F | HEART RATE: 58 BPM | DIASTOLIC BLOOD PRESSURE: 72 MMHG | SYSTOLIC BLOOD PRESSURE: 119 MMHG

## 2022-05-25 DIAGNOSIS — M54.16 LUMBAR RADICULAR PAIN: Primary | ICD-10-CM

## 2022-05-25 DIAGNOSIS — M48.061 LUMBAR FORAMINAL STENOSIS: ICD-10-CM

## 2022-05-25 PROCEDURE — 99214 OFFICE O/P EST MOD 30 MIN: CPT | Performed by: PHYSICAL MEDICINE & REHABILITATION

## 2022-05-25 RX ORDER — METHYLPREDNISOLONE 4 MG
TABLET, DOSE PACK ORAL
Qty: 21 TABLET | Refills: 0 | Status: SHIPPED | OUTPATIENT
Start: 2022-05-25 | End: 2022-07-06

## 2022-05-25 RX ORDER — GABAPENTIN 100 MG/1
CAPSULE ORAL
Qty: 270 CAPSULE | Refills: 1 | Status: SHIPPED | OUTPATIENT
Start: 2022-05-25 | End: 2022-07-06

## 2022-05-25 RX ORDER — GABAPENTIN 100 MG/1
CAPSULE ORAL
Qty: 270 CAPSULE | Refills: 2 | Status: SHIPPED | OUTPATIENT
Start: 2022-05-25 | End: 2022-10-20

## 2022-05-25 ASSESSMENT — PAIN SCALES - GENERAL: PAINLEVEL: NO PAIN (0)

## 2022-05-25 NOTE — PATIENT INSTRUCTIONS
Herb    I have decreased your gabapentin to 100 mg capsules.  You can take 1 capsule (100 mg closed at bedtime or 2 capsules (200 mg) at bedtime.  If you do want to add 1 to 2 capsules in the morning and/or afternoon you are also welcome to do this.  I am hoping that the 100 mg capsule will make you less groggy than the 300 mg capsule that you have currently been taking.    I am prescribing a Medrol Dosepak for you to bring on your trip just in case you have a flareup of your pain.  Please only take this if your pain becomes very severe on your trip.    At this point I would recommend rescheduling your injection for June 7.  If you are still doing well and not having much pain we do not need to move forward with that injection.  But if you do feel like you need the injection before your trip we will be all set on the seventh.    Please do not hesitate to reach out to me through [employer] if you have any questions or concerns before your trip.  Have a wonderful time in [place] and I hope you get to catch and eat a lot of salmon!    Thanks, Herb!  Dr. Machuca

## 2022-05-25 NOTE — LETTER
5/25/2022         RE: Rasheed Hanson  [address]  [facility] [address]        Dear Colleague,    Thank you for referring your patient, Rasheed Hanson, to the [facility]. Please see a copy of my visit note below.    Assessment:   Rasheed Hanson (AKA Wilber)  is a 77 year old y.o. male with past medical history significant for GERD, gout, BPH who presents today for follow-up regarding acute right-sided low back pain with right radicular leg pain that started approximately 1 month ago.  His MRI shows severe right L4-5 foraminal stenosis secondary to degenerative changes and scoliosis as well as a spondylolisthesis and lateral recess stenosis.  There does appear to be impingement of the right L5 nerve.  He is most recently status post a right L4-5 TFESI on May 13, 2022.  At this time, he is reporting approximately 85% relief of his pain following this injection he remains neurologically intact and without any red flag symptoms    PSP:  Dr. Machuca       Plan:     A shared decision making plan was used.  The patient's values and choices were respected.  The following represents what was discussed and decided upon by the physician and the patient.      1.  DIAGNOSTIC TESTS: The patient's MRI of the lumbar spine from April 29, 2022 was personally reviewed today by the physician with physician performing her own interpretation.  The patient does have a significant L4-5 spondylolisthesis which causes significant central canal stenosis, lateral recess stenosis and severe right L4-5 foraminal stenosis.  There is likely right L5 nerve root impingement from the lateral recess stenosis.  There are degenerative changes throughout.  -No further diagnostic testing necessary at this time.  2.  PHYSICAL THERAPY: At the last visit, he was given an order for physical therapy.  At this time he has not yet scheduled physical therapy, or at least not scheduled within the system yet.  He may be  waiting until he returns from his trip.  I did send him a [employer] message to check on this.  -He previously completed physical therapy in 2019 and has been diligent in doing a home exercise program from that course of physical therapy.  3.  MEDICATIONS:    -At the last visit he was prescribed gabapentin 300 mg and asked to titrate up to a maximum of 900 mg 3 times a day.  At this time, he is having some slight side effects.  I did prescribe him gabapentin 100 mg capsules.  He can try taking 1 to 3 capsules at bedtime.  He can add 1 to 3 capsules in the morning and/or afternoon.  I am hoping that the lower dose will help provide some pain relief but not cause the drowsiness.  A PDMP check was run today and he is deemed appropriate for continued gabapentin use.  -I did prescribe a Medrol Dosepak for him to take on his trip to [place] just in case he has a flare of pain while he is up there.  He should not take it if his pain remains manageable.  -His prescriptions were initially sent to [place] so they had to be resent to his preferred pharmacy and [place].  -He failed to have long-lasting relief with a Medrol Dosepak.  4.  INTERVENTIONS   -He is scheduled for right L5-S1 transforaminal epidural steroid injection on June 1, 2022.  At this time given that he is doing better I would recommend postponing the injection.  He is nervous that he may have pain before his trip so.  He reports that he will be leaving on June 8 to go to [place] before he goes on the [employer] trip.  I did explain that we can schedule it for June 7, 2022.  If he is doing better at that time he can cancel it, as I do not want to do this injection for prevention.  We should only do it if he is having pain that would limit him from being able to be active and enjoy his trip.  He verbalized understanding and will reschedule it for June 7, 2022.  -The patient is status post a right L4-5 transforaminal epidural steroid injection on May 13, 2022.   At this time he was reporting 85% relief.  5.  PATIENT EDUCATION:    -I did explain that he may need to have a repeat injection at some point down the road but I am hoping that he will have long-lasting relief.  -All of his questions were answered to his satisfaction today.  He was in agreement the treatment plan  6.  FOLLOW-UP: Patient can schedule the right L5-S1 TFESI on June 7, 2022.  We can cancel it if he is still doing better if there are any questions/concerns or any significant worsening of pain prior to that time, the patient is asked to call the clinic via the nurse navigation line or via [employer].     A total of 31 minutes was spent in the care of this patient on the date of service.    Subjective:     Rasheed Hanson (AKA Wilber)  is a 77 year old male who presents today for follow-up regarding acute flareup of right-sided low back pain and right radicular/sciatic type leg symptoms.  The patient reports that he had approximately 85% relief with the right L4-5 transforaminal epidural steroid injection performed on May 13, 2022.  He says that he still gets some slight twinges in his right calf, but overall things seem to be better.  He is starting to notice a little bit of pain in the left low back and left buttock that wraps around into the left groin but nothing that is overly bothersome to him.  He rates his pain today at a 1 out of 10.  At worst it is a 2 out of 10.  At best is a 0 out of 10.  He reports that any use of his abs or when he bears down such as having a bowel movement will aggravate the pain slightly.  He does feel better with stretching.  He is taking gabapentin at bedtime but he wakes up in the morning groggy with the 300 mg capsule.  He has been hesitant to take it during the day.  Other than the gabapentin he is taking ibuprofen occasionally.  He is wondering if she should have the other injection or not at this point given that he feels significantly better    Past medical history is  reviewed and is unchanged for any new medical diagnoses in the interim.      Family history is reviewed and is unchanged in the interim.      Review of Systems:  Positive for numbness and tingling in the right distal lower leg.  Positive for occasional headaches.  Pertinent negatives include no weakness.  Fevers, chills, unexplained weight loss, bowel incontinence, bladder incontinence, trips, stumbles, falls.  All others reviewed and are negative.     Objective:   CONSTITUTIONAL:  Vital signs as above.  No acute distress.  The patient is well nourished and well groomed.    PSYCHIATRIC:  The patient is awake, alert, oriented to person, place and time.  The patient is answering questions appropriately with clear speech.  Normal affect.  SKIN:  Skin over the face, posterior torso, bilateral upper and lower extremities is clean, dry, intact without rashes.  MUSCULOSKELETAL:  Gait is non-antalgic.  The patient is able to heel and toe walk without any difficulty.  No significant tenderness over the bilateral lumbar paraspinal muscles.      The patient has 5/5 strength for the bilateral hip flexors, knee flexors/extensors, ankle dorsiflexors/plantar flexors, ankle evertors/invertors.    NEUROLOGICAL: 1/4 patellar, medial hamstring, achilles reflexes which are symmetric bilaterally.  No ankle clonus bilaterally.  Sensation to light touch is intact in the bilateral L4, L5, and S1 dermatomes.               Again, thank you for allowing me to participate in the care of your patient.        Sincerely,        Trudy Munson, DO

## 2022-06-03 ENCOUNTER — MYC MEDICAL ADVICE (OUTPATIENT)
Dept: PHYSICAL MEDICINE AND REHAB | Facility: CLINIC | Age: 77
End: 2022-06-03
Payer: COMMERCIAL

## 2022-06-07 NOTE — TELEPHONE ENCOUNTER
Call placed to pt in follow-up to providers [employer] message. Pt scheduled for injection appointment this afternoon.     Patient reports he is still feeling pretty good at this time and he is not in significant pain. We will cancel injection appointment at this time. He does have prescribed medrol dose vanessa to bring on his upcoming trip should he have a pain flare up. Patient will call in the future should his symptoms flare back up and he is in significant pain.

## 2022-07-06 ENCOUNTER — MYC MEDICAL ADVICE (OUTPATIENT)
Dept: PHYSICAL MEDICINE AND REHAB | Facility: CLINIC | Age: 77
End: 2022-07-06

## 2022-07-06 ENCOUNTER — OFFICE VISIT (OUTPATIENT)
Dept: PHYSICAL MEDICINE AND REHAB | Facility: CLINIC | Age: 77
End: 2022-07-06
Payer: COMMERCIAL

## 2022-07-06 VITALS — SYSTOLIC BLOOD PRESSURE: 115 MMHG | TEMPERATURE: 98.2 F | DIASTOLIC BLOOD PRESSURE: 65 MMHG | HEART RATE: 81 BPM

## 2022-07-06 DIAGNOSIS — M16.12 PRIMARY OSTEOARTHRITIS OF LEFT HIP: ICD-10-CM

## 2022-07-06 DIAGNOSIS — M54.16 LUMBAR RADICULAR PAIN: ICD-10-CM

## 2022-07-06 DIAGNOSIS — M16.12 PRIMARY OSTEOARTHRITIS OF LEFT HIP: Primary | ICD-10-CM

## 2022-07-06 DIAGNOSIS — M25.552 HIP PAIN, LEFT: Primary | ICD-10-CM

## 2022-07-06 PROCEDURE — 99214 OFFICE O/P EST MOD 30 MIN: CPT | Performed by: PHYSICAL MEDICINE & REHABILITATION

## 2022-07-06 ASSESSMENT — PAIN SCALES - GENERAL: PAINLEVEL: MILD PAIN (3)

## 2022-07-06 NOTE — LETTER
7/6/2022         RE: Rasheed Hanson  [address]  [facility] [address]        Dear Colleague,    Thank you for referring your patient, Rasheed Hanson, to the [facility]ERY. Please see a copy of my visit note below.    Assessment:   Rasheed Hanson (AKA Wilber)  is a 77 year old y.o. male with past medical history significant for GERD, gout, BPH who presents today for follow-up regarding acute flareup/worsening of left groin pain, which is likely intra-articular hip joint pain secondary to primary osteoarthritis of the left hip.  The patient reports that he has had this pain for the last 6 months, but it did significantly worsened yesterday after working on some projects around the house yesterday.  He is neurologically intact and without any red flag symptoms.    He reports that he continues to have relief of his chronic right low back and right sciatica/lumbar radicular leg pain following the right L4-5 TFESI performed on May 13, 2022.    PSP:  Dr. Machuca       Plan:     A shared decision making plan was used.  The patient's values and choices were respected.  The following represents what was discussed and decided upon by the physician and the patient.      1.  DIAGNOSTIC TESTS:    -The patient's MRI of the lumbar spine from April 29, 2022 was personally reviewed today by the physician with physician performing her own interpretation.  Patient does have a significant L4-5 spondylolisthesis which causes central canal stenosis, lateral recess stenosis, and severe right L4-5 foraminal stenosis.  There is likely right L5 nerve root impingement from the lateral recess stenosis.  There are degenerative changes seen throughout.  The hips do not have any apparent avascular necrosis.  -I did offer to repeat the left hip x-ray, but he declined at this time.  We can order this with the future if needed.  2.  PHYSICAL THERAPY: He was previously referred to physical therapy.  He did  not start physical therapy as he stated that he question the value of performing it at this time.  I explained that I think that it would likely help him learn exercises that he can do that hopefully help prevent pain from returning.  They can also work on his hip, which can help with recovery if he does undergo a total hip arthroplasty.  We did give him a paper copy of the order so that he can take it to his preferred location where he is already established as a patient.  -He did complete physical therapy in 2019.  He is encouraged to continue doing his home exercise program on a regular basis.  3.  MEDICATIONS: His gabapentin dose was previously decreased from 300 mg to 100 mg.  At this time, he is just taking 100 mg at bedtime secondary to having side effects during the day.  I did explain that as long as it is helping him sleep and he is not having side effects in the morning, he can continue to take the 100 mg at bedtime.  He does not need to continue on a daytime dose.  A PDMP check was run today and he is deemed appropriate for continued gabapentin use.  4.  INTERVENTIONS:    -I did offer him a right intra-articular hip joint injection for next week if things do not improve in the next few days.  I would like to give it a little bit of time to see if this flare of pain will calm down.  He is in agreement with this.  -He is status post a right L4-5 TFESI on May 13, 2022 which provided 85% relief and continues to provide good relief  5.  PATIENT EDUCATION:    -I did discuss the diagnosis of hip pain with him.  -I explained the rationale for waiting to perform an injection at this time which he is in agreement with.  -I explained the rationale of performing an injection prior to considering a total hip arthroplasty.  The injection will be both diagnostic and potentially therapeutic if he elects to have it with steroid.  I will help confirm that the hip is the cause of his groin pain.  If he does not have  relief, then I would think that potentially the groin pain is coming from the low back and lumbar radiculitis.  -All of his questions were answered to his satisfaction today.  6.  FOLLOW-UP: I did create a test to check in with him on Monday to see how his hip pain is doing.  If he is doing better at that time that he can follow-up as needed.  If he is not doing better would like to proceed with a hip joint injection, I would asked my colleague order the left hip intra-articular injection performed under ultrasound guidance with either Dr. Oliveros or Dr. Marks.  If there are any questions/concerns or any significant worsening of pain prior to that time, the patient is asked to call the clinic via the nurse navigation line or via [employer].     A total of 31 minutes was spent in the care of this patient on the date of service.    Subjective:     Rasheed Hanson (AKA Wilber) is a 77 year old male who presents today for follow-up regarding acute flareup of left groin pain.  The patient reports that he has had this groin pain since January.  However got significantly worse after yesterday.  Reports that yesterday he was doing some work/projects around the house.  He was walking on concrete and doing some lifting.  He started to get the left buttock pain that radiates into the left groin area and then goes into the proximal left anterior thigh.  Reports that the pain was so bad that he could not really stand, he had to sit down.  Reports that it still really bothering him again today, though not quite as severe as it was yesterday.  The pain just goes into the anterior proximal thigh.  It does not go distal to the knee.  He denies any numbness or tingling, but he does feel like he is weak on the left side secondary to pain.  He continues to report that he has had good relief of the right-sided low back pain and right leg pain following the epidural steroid injection.  Reports that he is a good time on his trip.  He  reports that he had to stretch in the morning and the evening in order to keep the pain at bay, but it did not interfere with any of his activities on his [place] fishing trip.    Overall his pain is worse right now walking and standing.  He does feel better with sitting and laying down.  He did not start physical therapy yet, and questions if he should do so.  He is taking ibuprofen as needed for pain.  He takes the gabapentin just at night, as he states that he was having side effects to it during the day.    Past medical history is reviewed and is unchanged for any new medical diagnoses in the interim.      Family history is reviewed and is unchanged in the interim.      Review of Systems:  Positive for sensation of weakness in the left hip..  Pertinent negatives include no numbness, tingling, headaches fevers, chills, unexplained weight loss, bowel incontinence, bladder incontinence, trips, stumbles, falls.  All others reviewed and are negative.     Objective:   CONSTITUTIONAL:  Vital signs as above.  No acute distress.  The patient is well nourished and well groomed.    PSYCHIATRIC:  The patient is awake, alert, oriented to person, place and time.  The patient is answering questions appropriately with clear speech.  Normal affect.  SKIN:  Skin over the face, posterior torso, bilateral upper and lower extremities is clean, dry, intact without rashes.  MUSCULOSKELETAL:  Gait is is slightly antalgic.  He initially walks with a cane but then is able to walk without the cane.    The patient has 5/5 strength for the bilateral hip flexors, knee flexors/extensors, ankle dorsiflexors/plantar flexors, ankle evertors/invertors.  He does not have any pain with right hip internal or external rotation.  Mallory's test is negative on the right side.  With left hip internal and external rotation he does not have any significant pain, except at the endpoint of motion with left hip internal rotation, he does get reproduction of  the left buttock pain.  Mallory's test is positive on the left side for concordant groin pain on the left side.  NEUROLOGICAL: Trace patellar, medial hamstring, achilles reflexes which are symmetric bilaterally.  No ankle clonus bilaterally.  Sensation to light touch is intact in the bilateral L4, L5, and S1 dermatomes.               Again, thank you for allowing me to participate in the care of your patient.        Sincerely,        Trudy Munson, DO

## 2022-07-06 NOTE — PATIENT INSTRUCTIONS
Herb    I think your current pain is coming from your left hip, secondary to osteoarthritis and degenerative changes of the joint.  The MRI shows the hips just a little bit, but not in detail.  However I was able to rule out that there is any serious condition such as avascular necrosis.    I think we just need to give your hip a couple of days to try and calm down.  I will plan to check in with you through [employer] on Monday.  If you are still feeling like the pain is severe and would like to move forward with a hip joint injection, we can order hip joint injection at that time.    We did give you a copy of the physical therapy order today.  Please bring this to your preferred physical therapy location.  I think that physical therapy will help you, even if you undergo hip surgery, will help speed your recovery.    You can just take the gabapentin at night if it makes you have side effects during the day.    Please let me know if you have any questions, concerns, or if your pain gets worse before I check in with you on Monday.  You are welcome to send me a [employer] message, or you can call the clinic at [phone] (but the [employer] message typically reaches me the fastest).    Thanks, Herb.  Dr. Machuca

## 2022-07-11 NOTE — TELEPHONE ENCOUNTER
Pt called in. Order placed for US guided left intra-articular hip joint injection with either Dr. Oliveros or Dr. Marks. Injection requirements reviewed. Transferred to scheduling to make appt.

## 2022-07-20 ENCOUNTER — TRANSFERRED RECORDS (OUTPATIENT)
Dept: PULMONOLOGY | Facility: OTHER | Age: 77
End: 2022-07-20

## 2022-07-22 ENCOUNTER — RADIOLOGY INJECTION OFFICE VISIT (OUTPATIENT)
Dept: PHYSICAL MEDICINE AND REHAB | Facility: CLINIC | Age: 77
End: 2022-07-22
Attending: PHYSICAL MEDICINE & REHABILITATION
Payer: COMMERCIAL

## 2022-07-22 VITALS
HEART RATE: 59 BPM | DIASTOLIC BLOOD PRESSURE: 68 MMHG | OXYGEN SATURATION: 98 % | TEMPERATURE: 98 F | SYSTOLIC BLOOD PRESSURE: 126 MMHG

## 2022-07-22 DIAGNOSIS — M16.12 PRIMARY OSTEOARTHRITIS OF LEFT HIP: ICD-10-CM

## 2022-07-22 PROCEDURE — 20611 DRAIN/INJ JOINT/BURSA W/US: CPT | Mod: LT | Performed by: PHYSICAL MEDICINE & REHABILITATION

## 2022-07-22 RX ORDER — ROPIVACAINE HYDROCHLORIDE 5 MG/ML
INJECTION, SOLUTION EPIDURAL; INFILTRATION; PERINEURAL
Status: COMPLETED | OUTPATIENT
Start: 2022-07-22 | End: 2022-07-22

## 2022-07-22 RX ORDER — METHYLPREDNISOLONE ACETATE 40 MG/ML
INJECTION, SUSPENSION INTRA-ARTICULAR; INTRALESIONAL; INTRAMUSCULAR; SOFT TISSUE
Status: COMPLETED | OUTPATIENT
Start: 2022-07-22 | End: 2022-07-22

## 2022-07-22 RX ORDER — LIDOCAINE HYDROCHLORIDE 10 MG/ML
INJECTION, SOLUTION EPIDURAL; INFILTRATION; INTRACAUDAL; PERINEURAL
Status: COMPLETED | OUTPATIENT
Start: 2022-07-22 | End: 2022-07-22

## 2022-07-22 RX ADMIN — LIDOCAINE HYDROCHLORIDE 1.5 ML: 10 INJECTION, SOLUTION EPIDURAL; INFILTRATION; INTRACAUDAL; PERINEURAL at 09:19

## 2022-07-22 RX ADMIN — METHYLPREDNISOLONE ACETATE 40 MG: 40 INJECTION, SUSPENSION INTRA-ARTICULAR; INTRALESIONAL; INTRAMUSCULAR; SOFT TISSUE at 09:20

## 2022-07-22 RX ADMIN — ROPIVACAINE HYDROCHLORIDE 4 ML: 5 INJECTION, SOLUTION EPIDURAL; INFILTRATION; PERINEURAL at 09:20

## 2022-07-22 ASSESSMENT — PAIN SCALES - GENERAL: PAINLEVEL: MILD PAIN (2)

## 2022-07-22 NOTE — PROGRESS NOTES
Assessment/Plan:      Rasheed was seen today for injections.    Diagnoses and all orders for this visit:    Primary osteoarthritis of left hip  -     PAIN US Large Joint Injection Unilateral  -     AR ARTHROCENTESIS ASPIR&/INJ MAJOR JT/BURSA W/US         Assessment: Pleasant 77 year old male  with past medical history significant for GERD, gout, BPH  with:    1.  Left hip pain related to primary hip osteoarthritis.      Discussion:    1. Patient presents at the request of Dr. Chuyita Machuca for left hip steroid injection under ultrasound guidance.  He wishes to proceed.  Please see attached procedure note.    2.  Follow-up with Dr. Machuca 2 to 4 weeks.      It was our pleasure caring for your patient today, if there any questions or concerns please do not hesitate to contact us.      Subjective:   Patient ID: Rasheed Hanson is a 77 year old male.    History of Present Illness: Patient presents with left hip pain in the groin left lateral hip with prolonged standing and walking.  Presents at the request of Dr. Trudy Machuca for left hip injection under ultrasound guidance.      Imaging: Coronal imaging of the lumbar spine reviewed today to evaluate the left hip which shows at least mild hip osteoarthritis.    Review of Systems: Denies  fevers, chills, sweats, recent illnesses or antibiotics.           Past Medical History:   Diagnosis Date     Acute gouty arthropathy     Created by Conversion      Bronchitis, not specified as acute or chronic     Created by Conversion      Dermatophytosis of foot     Created by Conversion      Enthesopathy     Created by Conversion  Replacement Utility updated for latest IMO load     Infective otitis externa     Created by Conversion  Replacement Utility updated for latest IMO load     Other and unspecified hyperlipidemia     Created by Conversion      Thoracic or lumbosacral neuritis or radiculitis, unspecified     Created by Conversion        The following portions of the  "patient's history were reviewed and updated as appropriate: allergies, current medications, past family history, past medical history, past social history, past surgical history and problem list.           Objective:   Physical Exam:    /68   Pulse 59   Temp 98  F (36.7  C) (Oral)   SpO2 98%   There is no height or weight on file to calculate BMI.      General: Alert and oriented with normal affect. Attention, knowledge, memory, and language are intact. No acute distress.     Respirations: Unlabored. CV: No lower extremity edema.  Skin: No rashes seen about the left hip.    Gait:  Nonantalgic  Decreased internal rotation of the left hip from supine mildly with reproduction of symptoms.  Sensation is intact to light touch throughout the  lower extremities.     Manual muscle testing reveals:  Right /Left out of 5     5/5 ankle plantar flexors  5/5 ankle dorsiflexors  5/5    ankle evertors    Procedure:    After discussing the risks and benefits of an intra-articular steroid injection to the left hip including but not limited to infection, bleeding/hematoma, avascular necrosis, and steroid flare, informed consent was obtained. A time out was done prior to the procedure. The left proximal femur was surveyed to locate the femoral head and neck. The target was identified. The site was then marked and prepped with ChloraPrep. The skin was anesthetized with 1.5 mL of 1% lidocaine. Under ultrasound visualization, a 22-gauge 3.5\"  needle was advanced to the joint capsule. 5 mL's of injectate containing 4 mL's of 0.5% ropivacaine and 1 mL containing 40 mg of Depo-Medrol was injected after aspiration was negative for heme. The patient tolerated the procedure well and there were no immediate complications.    Preprocedure pain: 2/10   Postprocedure pain: 0/10    "

## 2022-07-22 NOTE — PATIENT INSTRUCTIONS
DISCHARGE INSTRUCTIONS    During office hours (8:00 a.m.- 4:00 p.m.) questions or concerns may be answered  by calling [facility] Navigation Nurses at  [phone].  Messages received after hours will be returned the following business day.      In the case of an emergency, please dial 911 or seek assistance at the nearest Emergency Room/Urgent Care facility.     All Patients:    You may experience an increase in your symptoms for the first 2 days (It may take anywhere between 2 days- 2 weeks for the steroid to have maximum effect).    You may use ice on the injection site, as frequently as 20 minutes each hour if needed.    You may take your pain medicine.    You may continue taking your regular medication after your injection. If you have had a Medial Branch Block you may resume pain medication once your pain diary is completed.    You may shower. No swimming, tub bath or hot tub for 48 hours.  You may remove your bandaid/bandage as soon as you are home.    You may resume light activities, as tolerated.    Resume your usual diet as tolerated.    It is strongly advised that you do not drive for 1-3 hours post injection.    POSSIBLE STEROID SIDE EFFECTS (If steroid/cortisone was used for your procedure)    -If you experience these symptoms, it should only last for a short period    Swelling of the legs              Skin redness (flushing)     Mouth (oral) irritation   Blood sugar (glucose) levels            Sweats                    Mood changes  Headache  Sleeplessness  Weakened immune system for up to 14 days, which could increase the risk of yasmine the COVID-19 virus and/or experiencing more severe symptoms of the disease, if exposed.  Decreased effectiveness of the flu vaccine if given within 2 weeks of the steroid.         POSSIBLE PROCEDURE SIDE EFFECTs    -Call the Spine Center if you are concerned  Increased Pain           Increased numbness/tingling      Nausea/Vomiting          Bruising/bleeding  at site      Redness or swelling                                              Difficulty walking      Weakness           Fever greater than 100.5    *In the event of a severe headache after an epidural steroid injection that is relieved by lying down, please call the [facility] to speak with a clinical staff member*

## 2022-08-16 NOTE — PROGRESS NOTES
Assessment:   Rasheed Hanson (AKA Wilber)  is a 77 year old y.o. male with past medical history significant for GERD, gout, BPH who presents today for follow-up regarding acute flareup/worsening of left groin pain, which is likely intra-articular hip joint pain secondary to primary osteoarthritis of the left hip.  The patient reports that he has had this pain for the last 6 months, but it did significantly worsened yesterday after working on some projects around the house yesterday.  He is neurologically intact and without any red flag symptoms.    He reports that he continues to have relief of his chronic right low back and right sciatica/lumbar radicular leg pain following the right L4-5 TFESI performed on May 13, 2022.    PSP:  Dr. Machuca       Plan:     A shared decision making plan was used.  The patient's values and choices were respected.  The following represents what was discussed and decided upon by the physician and the patient.      1.  DIAGNOSTIC TESTS:    -The patient's MRI of the lumbar spine from April 29, 2022 was personally reviewed today by the physician with physician performing her own interpretation.  Patient does have a significant L4-5 spondylolisthesis which causes central canal stenosis, lateral recess stenosis, and severe right L4-5 foraminal stenosis.  There is likely right L5 nerve root impingement from the lateral recess stenosis.  There are degenerative changes seen throughout.  The hips do not have any apparent avascular necrosis.  -I did offer to repeat the left hip x-ray, but he declined at this time.  We can order this with the future if needed.  2.  PHYSICAL THERAPY: He was previously referred to physical therapy.  He did not start physical therapy as he stated that he question the value of performing it at this time.  I explained that I think that it would likely help him learn exercises that he can do that hopefully help prevent pain from returning.  They can also work on his  We will recheck an a1c with his next labs   hip, which can help with recovery if he does undergo a total hip arthroplasty.  We did give him a paper copy of the order so that he can take it to his preferred location where he is already established as a patient.  -He did complete physical therapy in 2019.  He is encouraged to continue doing his home exercise program on a regular basis.  3.  MEDICATIONS: His gabapentin dose was previously decreased from 300 mg to 100 mg.  At this time, he is just taking 100 mg at bedtime secondary to having side effects during the day.  I did explain that as long as it is helping him sleep and he is not having side effects in the morning, he can continue to take the 100 mg at bedtime.  He does not need to continue on a daytime dose.  A PDMP check was run today and he is deemed appropriate for continued gabapentin use.  4.  INTERVENTIONS:    -I did offer him a right intra-articular hip joint injection for next week if things do not improve in the next few days.  I would like to give it a little bit of time to see if this flare of pain will calm down.  He is in agreement with this.  -He is status post a right L4-5 TFESI on May 13, 2022 which provided 85% relief and continues to provide good relief  5.  PATIENT EDUCATION:    -I did discuss the diagnosis of hip pain with him.  -I explained the rationale for waiting to perform an injection at this time which he is in agreement with.  -I explained the rationale of performing an injection prior to considering a total hip arthroplasty.  The injection will be both diagnostic and potentially therapeutic if he elects to have it with steroid.  I will help confirm that the hip is the cause of his groin pain.  If he does not have relief, then I would think that potentially the groin pain is coming from the low back and lumbar radiculitis.  -All of his questions were answered to his satisfaction today.  6.  FOLLOW-UP: I did create a test to check in with him on Monday to see how his hip pain  is doing.  If he is doing better at that time that he can follow-up as needed.  If he is not doing better would like to proceed with a hip joint injection, I would asked my colleague order the left hip intra-articular injection performed under ultrasound guidance with either Dr. Oliveros or Dr. Marks.  If there are any questions/concerns or any significant worsening of pain prior to that time, the patient is asked to call the clinic via the nurse navigation line or via [employer].     A total of 31 minutes was spent in the care of this patient on the date of service.    Subjective:     Rasheed Hanson (AKA Wilber) is a 77 year old male who presents today for follow-up regarding acute flareup of left groin pain.  The patient reports that he has had this groin pain since January.  However got significantly worse after yesterday.  Reports that yesterday he was doing some work/projects around the house.  He was walking on concrete and doing some lifting.  He started to get the left buttock pain that radiates into the left groin area and then goes into the proximal left anterior thigh.  Reports that the pain was so bad that he could not really stand, he had to sit down.  Reports that it still really bothering him again today, though not quite as severe as it was yesterday.  The pain just goes into the anterior proximal thigh.  It does not go distal to the knee.  He denies any numbness or tingling, but he does feel like he is weak on the left side secondary to pain.  He continues to report that he has had good relief of the right-sided low back pain and right leg pain following the epidural steroid injection.  Reports that he is a good time on his trip.  He reports that he had to stretch in the morning and the evening in order to keep the pain at bay, but it did not interfere with any of his activities on his [place] fishing trip.    Overall his pain is worse right now walking and standing.  He does feel better with  sitting and laying down.  He did not start physical therapy yet, and questions if he should do so.  He is taking ibuprofen as needed for pain.  He takes the gabapentin just at night, as he states that he was having side effects to it during the day.    Past medical history is reviewed and is unchanged for any new medical diagnoses in the interim.      Family history is reviewed and is unchanged in the interim.      Review of Systems:  Positive for sensation of weakness in the left hip..  Pertinent negatives include no numbness, tingling, headaches fevers, chills, unexplained weight loss, bowel incontinence, bladder incontinence, trips, stumbles, falls.  All others reviewed and are negative.     Objective:   CONSTITUTIONAL:  Vital signs as above.  No acute distress.  The patient is well nourished and well groomed.    PSYCHIATRIC:  The patient is awake, alert, oriented to person, place and time.  The patient is answering questions appropriately with clear speech.  Normal affect.  SKIN:  Skin over the face, posterior torso, bilateral upper and lower extremities is clean, dry, intact without rashes.  MUSCULOSKELETAL:  Gait is is slightly antalgic.  He initially walks with a cane but then is able to walk without the cane.    The patient has 5/5 strength for the bilateral hip flexors, knee flexors/extensors, ankle dorsiflexors/plantar flexors, ankle evertors/invertors.  He does not have any pain with right hip internal or external rotation.  Mallory's test is negative on the right side.  With left hip internal and external rotation he does not have any significant pain, except at the endpoint of motion with left hip internal rotation, he does get reproduction of the left buttock pain.  Mallory's test is positive on the left side for concordant groin pain on the left side.  NEUROLOGICAL: Trace patellar, medial hamstring, achilles reflexes which are symmetric bilaterally.  No ankle clonus bilaterally.  Sensation to light touch  is intact in the bilateral L4, L5, and S1 dermatomes.

## 2022-09-01 ENCOUNTER — TRANSFERRED RECORDS (OUTPATIENT)
Dept: HEALTH INFORMATION MANAGEMENT | Facility: CLINIC | Age: 77
End: 2022-09-01

## 2022-10-06 ENCOUNTER — TELEPHONE (OUTPATIENT)
Dept: FAMILY MEDICINE | Facility: CLINIC | Age: 77
End: 2022-10-06

## 2022-10-06 NOTE — TELEPHONE ENCOUNTER
Order/Referral Request    Who is requesting: Patient    Orders being requested: labs PSA, PSH, CMP    Reason service is needed/diagnosis: prior to appt    When are orders needed by: as soon as possible    Has this been discussed with Provider: No    Does patient have a preference on a Group/Provider/Facility? [facility]    Does patient have an appointment scheduled?: No    Where to send orders: N/A    Could we send this information to you in [employer] or would you prefer to receive a phone call?:   Patient would prefer a phone call or [employer]  Okay to leave a detailed message?: Yes at Home number on file [phone] (home) or [place] - [phone]

## 2022-10-07 ENCOUNTER — DOCUMENTATION ONLY (OUTPATIENT)
Dept: LAB | Facility: CLINIC | Age: 77
End: 2022-10-07

## 2022-10-07 DIAGNOSIS — R97.20 ELEVATED PROSTATE SPECIFIC ANTIGEN (PSA): ICD-10-CM

## 2022-10-07 DIAGNOSIS — R79.89 ELEVATED TSH: ICD-10-CM

## 2022-10-07 DIAGNOSIS — Z13.1 SCREENING FOR DIABETES MELLITUS: ICD-10-CM

## 2022-10-07 DIAGNOSIS — Z13.220 SCREENING FOR HYPERLIPIDEMIA: Primary | ICD-10-CM

## 2022-10-07 DIAGNOSIS — Z12.5 SCREENING FOR PROSTATE CANCER: ICD-10-CM

## 2022-10-07 DIAGNOSIS — R29.2 ABNORMAL REFLEX: ICD-10-CM

## 2022-10-10 ENCOUNTER — MYC MEDICAL ADVICE (OUTPATIENT)
Dept: FAMILY MEDICINE | Facility: CLINIC | Age: 77
End: 2022-10-10

## 2022-10-10 DIAGNOSIS — R29.2 ABNORMAL REFLEX: Primary | ICD-10-CM

## 2022-10-10 NOTE — TELEPHONE ENCOUNTER
Please see MyChart from patient needing PCP review.     Is it ok to for patient to wait until appointment with you to discuss is TSH lab is appropriate?       Faby Kamara RN  [facility]

## 2022-10-19 PROBLEM — M25.552 LEFT HIP PAIN: Status: ACTIVE | Noted: 2022-04-01

## 2022-10-20 ENCOUNTER — NURSE TRIAGE (OUTPATIENT)
Dept: NURSING | Facility: CLINIC | Age: 77
End: 2022-10-20

## 2022-10-20 ENCOUNTER — VIRTUAL VISIT (OUTPATIENT)
Dept: FAMILY MEDICINE | Facility: CLINIC | Age: 77
End: 2022-10-20
Payer: COMMERCIAL

## 2022-10-20 VITALS
DIASTOLIC BLOOD PRESSURE: 70 MMHG | OXYGEN SATURATION: 97 % | BODY MASS INDEX: 24.91 KG/M2 | HEIGHT: 66 IN | SYSTOLIC BLOOD PRESSURE: 107 MMHG | HEART RATE: 67 BPM | WEIGHT: 155 LBS

## 2022-10-20 DIAGNOSIS — Z00.00 ENCOUNTER FOR MEDICARE ANNUAL WELLNESS EXAM: Primary | ICD-10-CM

## 2022-10-20 DIAGNOSIS — K59.00 CONSTIPATION, UNSPECIFIED CONSTIPATION TYPE: ICD-10-CM

## 2022-10-20 DIAGNOSIS — N52.9 ERECTILE DYSFUNCTION, UNSPECIFIED ERECTILE DYSFUNCTION TYPE: ICD-10-CM

## 2022-10-20 DIAGNOSIS — R35.1 NOCTURIA ASSOCIATED WITH BENIGN PROSTATIC HYPERPLASIA: ICD-10-CM

## 2022-10-20 DIAGNOSIS — M1A.9XX0 CHRONIC GOUT WITHOUT TOPHUS, UNSPECIFIED CAUSE, UNSPECIFIED SITE: ICD-10-CM

## 2022-10-20 DIAGNOSIS — M54.16 LUMBAR RADICULAR PAIN: ICD-10-CM

## 2022-10-20 DIAGNOSIS — N40.1 NOCTURIA ASSOCIATED WITH BENIGN PROSTATIC HYPERPLASIA: ICD-10-CM

## 2022-10-20 DIAGNOSIS — U07.1 INFECTION DUE TO 2019 NOVEL CORONAVIRUS: ICD-10-CM

## 2022-10-20 PROCEDURE — 99214 OFFICE O/P EST MOD 30 MIN: CPT | Mod: 25 | Performed by: INTERNAL MEDICINE

## 2022-10-20 PROCEDURE — G0439 PPPS, SUBSEQ VISIT: HCPCS | Performed by: INTERNAL MEDICINE

## 2022-10-20 RX ORDER — ALLOPURINOL 100 MG/1
100 TABLET ORAL DAILY
Qty: 90 TABLET | Refills: 3 | Status: SHIPPED | OUTPATIENT
Start: 2022-10-20 | End: 2023-09-12

## 2022-10-20 RX ORDER — GABAPENTIN 100 MG/1
100 CAPSULE ORAL AT BEDTIME
Qty: 90 CAPSULE | Refills: 3 | Status: SHIPPED | OUTPATIENT
Start: 2022-10-20 | End: 2023-09-12

## 2022-10-20 RX ORDER — TAMSULOSIN HYDROCHLORIDE 0.4 MG/1
0.4 CAPSULE ORAL DAILY
Qty: 90 CAPSULE | Refills: 3 | Status: SHIPPED | OUTPATIENT
Start: 2022-10-20 | End: 2023-09-12

## 2022-10-20 RX ORDER — TADALAFIL 10 MG/1
10 TABLET ORAL DAILY PRN
Qty: 30 TABLET | Refills: 3 | Status: SHIPPED | OUTPATIENT
Start: 2022-10-20

## 2022-10-20 ASSESSMENT — ENCOUNTER SYMPTOMS
COUGH: 0
ARTHRALGIAS: 1
SHORTNESS OF BREATH: 0
HEARTBURN: 1
NAUSEA: 1
HEADACHES: 1
CHILLS: 0
FREQUENCY: 1
WEAKNESS: 1
MYALGIAS: 0
DIARRHEA: 0
HEMATURIA: 0
CONSTIPATION: 0
SORE THROAT: 1
HEMATOCHEZIA: 0
PARESTHESIAS: 0
DIZZINESS: 0
ABDOMINAL PAIN: 0
NERVOUS/ANXIOUS: 1
PALPITATIONS: 0
EYE PAIN: 0
FEVER: 0
DYSURIA: 0
JOINT SWELLING: 0

## 2022-10-20 ASSESSMENT — ACTIVITIES OF DAILY LIVING (ADL): CURRENT_FUNCTION: NO ASSISTANCE NEEDED

## 2022-10-20 NOTE — PATIENT INSTRUCTIONS
Patient Education   Personalized Prevention Plan  You are due for the preventive services outlined below.  Your care team is available to assist you in scheduling these services.  If you have already completed any of these items, please share that information with your care team to update in your medical record.  Health Maintenance Due   Topic Date Due     Hepatitis B Vaccine (1 of 3 - 3-dose series) Never done     Zoster (Shingles) Vaccine (2 of 3) 01/26/2016     COVID-19 Vaccine (4 - Booster for Pfizer series) 01/10/2022     Flu Vaccine (1) Never done

## 2022-10-20 NOTE — TELEPHONE ENCOUNTER
He has an appt today for a physical and is wondering if he can still come in, or should he make it virtual, or reschedule.      Tested positive for COVID 10/15/2022-Saturday. Asymptomatic today.  Please call him back to let him know.          Coronavirus (COVID-19) Notification    Caller Name (Patient, parent, daughter/son, grandparent, etc)  Herb the patient    Reason for call  Notify us of Positive Coronavirus (COVID-19) lab results, assess symptoms,  review [facility] recommendations    Lab Result    Lab test:  2019-nCoV rRt-PCR or SARS-CoV-2 PCR    Oropharyngeal AND/OR nasopharyngeal swabs is POSITIVE for 2019-nCoV RNA/SARS-COV-2 PCR (COVID-19 virus)    Gather patient reported symptoms   Assessment   Current Symptoms at time of phone call, reported by patient: (if no symptoms, document No symptoms] None   Date of Symptom(s) onset (if applicable) 10/15/2022     If at time of call, Patients symptoms hare worsened, the Patient should contact 911 or have someone drive them to Emergency Dept promptly:      If Patient calling 911, inform 911 personal that you have tested positive for the Coronavirus (COVID-19).  Place mask on and await 911 to arrive.    If Emergency Dept, If possible, please have another adult drive you to the Emergency Dept but you need to wear mask when in contact with other people.      Monoclonal Antibody Administration    You may be eligible to receive a new treatment with a monoclonal antibody for preventing hospitalization in patients at high risk for complications from COVID-19. This medication is still experimental and available on a limited basis; it is given through an IV and must be given at an infusion center. Please note that not all people who are eligible will receive the medication since it is in limited supply.  Is the patient symptomatic and onset of symptoms within the last 7 days?  Yes  Is the patient interested in a visit with a provider to discuss treatment options?: No.   Reason patient declined:  Not that sick and don't think I will get worse (save for people who possibly need it more)    Review information with Patient    Your result was positive. This means you have COVID-19 (coronavirus).      How can I protect others?    These guidelines are for isolating before returning to work, school or .       If you DO have symptoms:  o Stay home and away from others  - For at least 5 days after your symptoms started, AND   - You are fever free for 24 hours (with no medicine that reduces fever), AND  - Your other symptoms are better.  o Wear a mask for 10 full days any time you are around others.    If you DON'T have symptoms:  o Stay at home and away from others for at least 5 days after your positive test.  o Wear a mask for 10 full days any time you are around others.    There may be different guidelines for healthcare facilities. Please check with the specific sites before arriving.     If you've been told by a doctor that you were severely ill with COVID-19 or are immunocompromised, you should isolate for at least 10 days.    You should not go back to work until you meet the guidelines above for ending your home isolation. You don't need to be retested for COVID-19 before going back to work--studies show that you won't spread the virus if it's been at least 10 days since your symptoms started (or 20 days, if you have a weak immune system).      Would you like me to review some of that information with you now?  Yes    How can I take care of myself?      Get lots of rest. Drink extra fluids (unless a doctor has told you not to).      Take Tylenol (acetaminophen) for fever or pain. If you have liver or kidney problems, ask your family doctor if it's okay to take Tylenol.     Take either:     650 mg (two 325 mg pills) every 4 to 6 hours, or     1,000 mg (two 500 mg pills) every 8 hours as needed.     Note: Do not take more than 3,000 mg in one day. Acetaminophen is found in many  medicines (both prescribed and over-the-counter medicines). Read all labels to be sure you don't take too much.    For children, check the Tylenol bottle for the right dose (based on their age or weight).      If you have other health problems (like cancer, heart failure, an organ transplant or severe kidney disease): Call your specialty clinic if you don't feel better in the next 2 days.      Know when to call 911: Emergency warning signs include:    Trouble breathing or shortness of breath    Pain or pressure in the chest that doesn't go away    Feeling confused like you haven't felt before, or not being able to wake up    Bluish-colored lips or face        If you were tested for an upcoming procedure, please contact your provider for next steps.     Thea Dutton RN  [facility] Nurse Advisor  10/20/2022 at 9:16 AM        Reason for Disposition    [1] HIGH RISK for severe COVID complications (e.g., weak immune system, age > 64 years, obesity with BMI of 30 or higher, pregnant, chronic lung disease or other chronic medical condition) AND [2] COVID symptoms (e.g., cough, fever)  (Exceptions: Already seen by PCP and no new or worsening symptoms.)    Additional Information    Negative: SEVERE difficulty breathing (e.g., struggling for each breath, speaks in single words)    Negative: Difficult to awaken or acting confused (e.g., disoriented, slurred speech)    Negative: Bluish (or gray) lips or face now    Negative: Shock suspected (e.g., cold/pale/clammy skin, too weak to stand, low BP, rapid pulse)    Negative: Sounds like a life-threatening emergency to the triager    Negative: [1] Diagnosed or suspected COVID-19 AND [2] symptoms lasting 3 or more weeks    Negative: [1] COVID-19 exposure AND [2] no symptoms    Negative: COVID-19 vaccine reaction suspected (e.g., fever, headache, muscle aches) occurring 1 to 3 days after getting vaccine    Negative: COVID-19 vaccine, questions about     Negative: [1] Lives with someone known to have influenza (flu test positive) AND [2] flu-like symptoms (e.g., cough, runny nose, sore throat, SOB; with or without fever)    Negative: [1] Adult with possible COVID-19 symptoms AND [2] triager concerned about severity of symptoms or other causes    Negative: COVID-19 and breastfeeding, questions about    Negative: SEVERE or constant chest pain or pressure  (Exception: Mild central chest pain, present only when coughing.)    Negative: MODERATE difficulty breathing (e.g., speaks in phrases, SOB even at rest, pulse 100-120)    Negative: Headache and stiff neck (can't touch chin to chest)    Negative: Oxygen level (e.g., pulse oximetry) 90 percent or lower    Negative: Chest pain or pressure  (Exception: MILD central chest pain, present only when coughing)    Negative: Patient sounds very sick or weak to the triager    Negative: MILD difficulty breathing (e.g., minimal/no SOB at rest, SOB with walking, pulse <100)    Negative: Fever > 103 F (39.4 C)    Negative: [1] Fever > 101 F (38.3 C) AND [2] over 60 years of age    Negative: [1] Fever > 100.0 F (37.8 C) AND [2] bedridden (e.g., nursing home patient, CVA, chronic illness, recovering from surgery)    Protocols used: CORONAVIRUS (COVID-19) DIAGNOSED OR [id number]

## 2022-10-20 NOTE — TELEPHONE ENCOUNTER
Call back to patient.    No answer on home number. LMTCB on his voicemail and advised him not to come in. Ok for virtual visit.    Unsure if message did get recorded as it beeped several times.    Call to cell number. I advised him that it is ok to switch to a virtual visit. He stated that he would like a telephone visit.  Will await call in 1 hr.  Try home number first, and then if no answer, call cell number for visit.      Thea Dutton RN  [facility] Nurse Advisor  10/20/2022 at 10:17 AM

## 2022-10-20 NOTE — PROGRESS NOTES
"SUBJECTIVE:   Wilber is a 77 year old who presents for Preventive Visit.        Are you in the first 12 months of your Medicare coverage?  No    Healthy Habits:     In general, how would you rate your overall health?  Good    Frequency of exercise:  2-3 days/week    Duration of exercise:  45-60 minutes    Do you usually eat at least 4 servings of fruit and vegetables a day, include whole grains    & fiber and avoid regularly eating high fat or \"junk\" foods?  Yes    Taking medications regularly:  Yes    Medication side effects:  Other    Ability to successfully perform activities of daily living:  No assistance needed    Home Safety:  No safety concerns identified    Hearing Impairment:  No hearing concerns    In the past 6 months, have you been bothered by leaking of urine?  No    In general, how would you rate your overall mental or emotional health?  Excellent      PHQ-2 Total Score: 0    Additional concerns today:  Yes    Had COVID started about 6 days ago. Feeling better now. No shortness of breath or chest pain.     History of lower back pain, improved with injection. Going to see physical therapy with some benefit. Left hip with some pain, feeling some pain in the groin- no bulge in groin area.    CKD noted on last labs, wanting for recheck, has been avoiding aspirin/NSAIDs.     Do you feel safe in your environment? Yes    Have you ever done Advance Care Planning? (For example, a Health Directive, POLST, or a discussion with a medical provider or your loved ones about your wishes): Yes, advance care planning is on file.      Fall risk  Fallen 2 or more times in the past year?: No  Any fall with injury in the past year?: No    Cognitive Screening Unable to complete due to virtual visit; need for additional assessment in future face-to-face visit    Do you have sleep apnea, excessive snoring or daytime drowsiness?: yes    Reviewed and updated as needed this visit by clinical staff   Tobacco  Allergies  Meds  " Problems             Reviewed and updated as needed this visit by Provider    Allergies  Meds  Problems            Social History     Tobacco Use     Smoking status: Never     Smokeless tobacco: Never   Substance Use Topics     Alcohol use: Yes     Alcohol/week: 14.0 standard drinks         Alcohol Use 10/20/2022   Prescreen: >3 drinks/day or >7 drinks/week? No   Prescreen: >3 drinks/day or >7 drinks/week? -               Current providers sharing in care for this patient include:   Patient Care Team:  Myke Yates MD as PCP - General (Internal Medicine - Pediatrics)  Joaquín Forbes MD as Assigned PCP  Trudy Munson DO as Assigned Neuroscience Provider    The following health maintenance items are reviewed in Epic and correct as of today:  Health Maintenance   Topic Date Due     HEPATITIS B IMMUNIZATION (1 of 3 - 3-dose series) Never done     ZOSTER IMMUNIZATION (2 of 3) 01/26/2016     COVID-19 Vaccine (4 - Booster for Pfizer series) 01/10/2022     INFLUENZA VACCINE (1) Never done     MEDICARE ANNUAL WELLNESS VISIT  09/10/2022     ANNUAL REVIEW OF HM ORDERS  10/20/2023     FALL RISK ASSESSMENT  10/20/2023     LIPID  09/10/2026     ADVANCE CARE PLANNING  10/20/2027     DTAP/TDAP/TD IMMUNIZATION (2 - Td or Tdap) 08/18/2030     PHQ-2 (once per calendar year)  Completed     Pneumococcal Vaccine: 65+ Years  Completed     IPV IMMUNIZATION  Aged Out     MENINGITIS IMMUNIZATION  Aged Out     HEPATITIS C SCREENING  Discontinued     COLORECTAL CANCER SCREENING  Discontinued       Review of Systems   Constitutional: Negative for chills and fever.   HENT: Positive for sore throat. Negative for congestion, ear pain and hearing loss.    Eyes: Negative for pain and visual disturbance.   Respiratory: Negative for cough and shortness of breath.    Cardiovascular: Negative for chest pain, palpitations and peripheral edema.   Gastrointestinal: Positive for heartburn and nausea. Negative for abdominal  "pain, constipation, diarrhea and hematochezia.   Genitourinary: Positive for frequency, impotence and urgency. Negative for genital sores, hematuria and penile discharge.   Musculoskeletal: Positive for arthralgias. Negative for joint swelling and myalgias.   Skin: Negative for rash.   Neurological: Positive for weakness and headaches. Negative for dizziness and paresthesias.   Psychiatric/Behavioral: Negative for mood changes. The patient is nervous/anxious.          OBJECTIVE:   /70   Pulse 67   Ht 1.676 m (5' 6\")   Wt 70.3 kg (155 lb)   SpO2 97%   BMI 25.02 kg/m   Estimated body mass index is 25.02 kg/m  as calculated from the following:    Height as of this encounter: 1.676 m (5' 6\").    Weight as of this encounter: 70.3 kg (155 lb).  Physical Exam  General: alert, interactive, NAD  HEENT: sclerae clear  Neck: appears supple  Resp: breathing regular and unlabored, speaking in full sentences  MSK: no visible deficits noted  Ext: warm and well perfused without edema        Diagnostic Test Results:  Labs reviewed in Epic    ASSESSMENT / PLAN:       ICD-10-CM    1. Encounter for Medicare annual wellness exam  Z00.00       2. Lumbar radicular pain - doing well with gabapentin 100 mg  Per day, had bad side effect at higher dose where felt unsteady on feet, has been able to be active without issues M54.16 gabapentin (NEURONTIN) 100 MG capsule      3. Erectile dysfunction, unspecified erectile dysfunction type - doing well on current therapy, no side effects N52.9 tadalafil (CIALIS) 10 MG tablet      4. Nocturia associated with benign prostatic hyperplasia - doing well on therapy, discussed use of this with tadalafil N40.1 tamsulosin (FLOMAX) 0.4 MG capsule    R35.1       5. Constipation, unspecified constipation type  K59.00 Multiple Vitamin (MULTI VITAMIN) TABS     Psyllium 33 % POWD      6. Chronic gout without tophus, unspecified cause, unspecified site  M1A.9XX0 allopurinol (ZYLOPRIM) 100 MG tablet   - " "no recent flares, stable on current therapy  Uric acid     CBC with platelets        COVID infection- outside the treatment window for paxlovid, discussed warning signs to wwatch for.     COUNSELING:  Reviewed preventive health counseling, as reflected in patient instructions    Estimated body mass index is 25.02 kg/m  as calculated from the following:    Height as of this encounter: 1.676 m (5' 6\").    Weight as of this encounter: 70.3 kg (155 lb).    Weight management plan: Discussed healthy diet and exercise guidelines    He reports that he has never smoked. He has never used smokeless tobacco.      Appropriate preventive services were discussed with this patient, including applicable screening as appropriate for cardiovascular disease, diabetes, osteopenia/osteoporosis, and glaucoma.  As appropriate for age/gender, discussed screening for colorectal cancer, prostate cancer, breast cancer, and cervical cancer. Checklist reviewing preventive services available has been given to the patient.    Reviewed patients plan of care and provided an AVS. The Basic Care Plan (routine screening as documented in Health Maintenance) for Rasheed meets the Care Plan requirement. This Care Plan has been established and reviewed with the Patient.    Counseling Resources:  ATP IV Guidelines  Pooled Cohorts Equation Calculator  Breast Cancer Risk Calculator  Breast Cancer: Medication to Reduce Risk  FRAX Risk Assessment  ICSI Preventive Guidelines  Dietary Guidelines for Americans, 2010  [employer]'s MyPlate  ASA Prophylaxis  Lung CA Screening    Myke Yates MD  [facility]    Identified Health Risks:  "

## 2022-10-24 ENCOUNTER — LAB (OUTPATIENT)
Dept: LAB | Facility: CLINIC | Age: 77
End: 2022-10-24
Payer: COMMERCIAL

## 2022-10-24 DIAGNOSIS — R29.2 ABNORMAL REFLEX: ICD-10-CM

## 2022-10-24 DIAGNOSIS — Z13.220 SCREENING FOR HYPERLIPIDEMIA: ICD-10-CM

## 2022-10-24 DIAGNOSIS — Z12.5 SCREENING FOR PROSTATE CANCER: ICD-10-CM

## 2022-10-24 DIAGNOSIS — M1A.9XX0 CHRONIC GOUT WITHOUT TOPHUS, UNSPECIFIED CAUSE, UNSPECIFIED SITE: ICD-10-CM

## 2022-10-24 DIAGNOSIS — Z13.1 SCREENING FOR DIABETES MELLITUS: ICD-10-CM

## 2022-10-24 LAB
ALBUMIN SERPL BCG-MCNC: 3.9 G/DL (ref 3.5–5.2)
ALP SERPL-CCNC: 80 U/L (ref 40–129)
ALT SERPL W P-5'-P-CCNC: 10 U/L (ref 10–50)
ANION GAP SERPL CALCULATED.3IONS-SCNC: 8 MMOL/L (ref 7–15)
AST SERPL W P-5'-P-CCNC: 30 U/L (ref 10–50)
BILIRUB SERPL-MCNC: 0.9 MG/DL
BUN SERPL-MCNC: 22.6 MG/DL (ref 8–23)
CALCIUM SERPL-MCNC: 9.4 MG/DL (ref 8.8–10.2)
CHLORIDE SERPL-SCNC: 104 MMOL/L (ref 98–107)
CHOLEST SERPL-MCNC: 146 MG/DL
CREAT SERPL-MCNC: 1.19 MG/DL (ref 0.67–1.17)
DEPRECATED HCO3 PLAS-SCNC: 27 MMOL/L (ref 22–29)
ERYTHROCYTE [DISTWIDTH] IN BLOOD BY AUTOMATED COUNT: 12.2 % (ref 10–15)
GFR SERPL CREATININE-BSD FRML MDRD: 63 ML/MIN/1.73M2
GLUCOSE SERPL-MCNC: 104 MG/DL (ref 70–99)
HCT VFR BLD AUTO: 43 % (ref 40–53)
HDLC SERPL-MCNC: 62 MG/DL
HGB BLD-MCNC: 14.3 G/DL (ref 13.3–17.7)
LDLC SERPL CALC-MCNC: 69 MG/DL
MCH RBC QN AUTO: 32 PG (ref 26.5–33)
MCHC RBC AUTO-ENTMCNC: 33.3 G/DL (ref 31.5–36.5)
MCV RBC AUTO: 96 FL (ref 78–100)
NONHDLC SERPL-MCNC: 84 MG/DL
PLATELET # BLD AUTO: 273 10E3/UL (ref 150–450)
POTASSIUM SERPL-SCNC: 4.7 MMOL/L (ref 3.4–5.3)
PROT SERPL-MCNC: 6.7 G/DL (ref 6.4–8.3)
PSA SERPL-MCNC: 7.96 NG/ML (ref 0–6.5)
RBC # BLD AUTO: 4.47 10E6/UL (ref 4.4–5.9)
SODIUM SERPL-SCNC: 139 MMOL/L (ref 136–145)
T4 FREE SERPL-MCNC: 0.91 NG/DL (ref 0.9–1.7)
TRIGL SERPL-MCNC: 75 MG/DL
TSH SERPL DL<=0.005 MIU/L-ACNC: 7.14 UIU/ML (ref 0.3–4.2)
URATE SERPL-MCNC: 5 MG/DL (ref 3.4–7)
WBC # BLD AUTO: 8.7 10E3/UL (ref 4–11)

## 2022-10-24 PROCEDURE — 80061 LIPID PANEL: CPT

## 2022-10-24 PROCEDURE — 84439 ASSAY OF FREE THYROXINE: CPT

## 2022-10-24 PROCEDURE — 36415 COLL VENOUS BLD VENIPUNCTURE: CPT

## 2022-10-24 PROCEDURE — 84443 ASSAY THYROID STIM HORMONE: CPT

## 2022-10-24 PROCEDURE — 80053 COMPREHEN METABOLIC PANEL: CPT

## 2022-10-24 PROCEDURE — 85027 COMPLETE CBC AUTOMATED: CPT

## 2022-10-24 PROCEDURE — 84550 ASSAY OF BLOOD/URIC ACID: CPT

## 2022-10-24 PROCEDURE — G0103 PSA SCREENING: HCPCS

## 2022-10-25 ENCOUNTER — TRANSFERRED RECORDS (OUTPATIENT)
Dept: HEALTH INFORMATION MANAGEMENT | Facility: CLINIC | Age: 77
End: 2022-10-25

## 2022-10-27 ENCOUNTER — TRANSFERRED RECORDS (OUTPATIENT)
Dept: HEALTH INFORMATION MANAGEMENT | Facility: CLINIC | Age: 77
End: 2022-10-27

## 2022-11-21 ENCOUNTER — HEALTH MAINTENANCE LETTER (OUTPATIENT)
Age: 77
End: 2022-11-21

## 2023-01-20 ENCOUNTER — MYC MEDICAL ADVICE (OUTPATIENT)
Dept: PEDIATRICS | Facility: CLINIC | Age: 78
End: 2023-01-20
Payer: COMMERCIAL

## 2023-01-23 NOTE — TELEPHONE ENCOUNTER
Please see MyChart from patient needing PCP review.       Faby FERRARO RN  [facility]

## 2023-01-31 ENCOUNTER — DOCUMENTATION ONLY (OUTPATIENT)
Dept: LAB | Facility: CLINIC | Age: 78
End: 2023-01-31
Payer: COMMERCIAL

## 2023-01-31 DIAGNOSIS — R97.20 ELEVATED PROSTATE SPECIFIC ANTIGEN (PSA): Primary | ICD-10-CM

## 2023-02-07 ENCOUNTER — LAB (OUTPATIENT)
Dept: LAB | Facility: CLINIC | Age: 78
End: 2023-02-07
Payer: COMMERCIAL

## 2023-02-07 DIAGNOSIS — R97.20 ELEVATED PROSTATE SPECIFIC ANTIGEN (PSA): ICD-10-CM

## 2023-02-07 LAB
ALBUMIN UR-MCNC: NEGATIVE MG/DL
APPEARANCE UR: CLEAR
BILIRUB UR QL STRIP: NEGATIVE
COLOR UR AUTO: YELLOW
GLUCOSE UR STRIP-MCNC: NEGATIVE MG/DL
HGB UR QL STRIP: NEGATIVE
KETONES UR STRIP-MCNC: NEGATIVE MG/DL
LEUKOCYTE ESTERASE UR QL STRIP: NEGATIVE
NITRATE UR QL: NEGATIVE
PH UR STRIP: 6 [PH] (ref 5–8)
RBC #/AREA URNS AUTO: NORMAL /HPF
SP GR UR STRIP: 1.01 (ref 1–1.03)
UROBILINOGEN UR STRIP-ACNC: 0.2 E.U./DL
WBC #/AREA URNS AUTO: NORMAL /HPF

## 2023-02-07 PROCEDURE — 81001 URINALYSIS AUTO W/SCOPE: CPT

## 2023-04-17 ENCOUNTER — MYC MEDICAL ADVICE (OUTPATIENT)
Dept: FAMILY MEDICINE | Facility: CLINIC | Age: 78
End: 2023-04-17
Payer: COMMERCIAL

## 2023-04-18 NOTE — TELEPHONE ENCOUNTER
See MyChart from Patient needing PCP review.  Please respond directly to patient, if at all able.    JESSICA Castelan  [facility]

## 2023-05-04 ENCOUNTER — TRANSFERRED RECORDS (OUTPATIENT)
Dept: HEALTH INFORMATION MANAGEMENT | Facility: CLINIC | Age: 78
End: 2023-05-04
Payer: COMMERCIAL

## 2023-06-21 NOTE — PROGRESS NOTES
Assessment:   Rasheed Hanson is a 78 year old y.o. male with past medical history significant for gout, hyperlipidemia who presents today for follow-up regarding 2 areas of pain.  1.  Chronic right shoulder pain.  Patient has a history of rotator cuff tear 10 years ago status post surgical repair.  3 years ago he began to experience more shoulder pain again.  2.  Chronic left hip pain.  Patient is status post a left intra-articular hip joint injection under ultrasound guidance July 22, 2022 with Dr. Marks which provided 80% relief of his pain for about 8 months.  Over the past 2 to 3 months, same pain returned.       Plan:     A shared decision making plan was used.  The patient's values and choices were respected.  The following represents what was discussed and decided upon by the physician assistant and the patient.      1.  DIAGNOSTIC TESTS:    - Reviewed the MRI lumbar spine.  - No additional diagnostic test were ordered.  I will defer to orthopedics to order imaging for his right shoulder.    2.  PHYSICAL THERAPY: No physical therapy was ordered today.  Patient will continue with home exercises.  - Patient had physical therapy for the low back and left hip July through November 2022  - Patient had physical therapy for left shoulder pain September through November 2020    3.  MEDICATIONS: No changes are made to the patient's medications.  - Patient takes gabapentin 100 to 200 mg at bedtime    4.  INTERVENTIONS:    -I offer the patient repeat left intra-articular hip joint injection under ultrasound guidance with Dr. Marks.  Patient indicated he would like to proceed and an order was placed.    5.  REFERRALS: I entered a referral to orthopedics.  Patient would like to see his previous surgeon, Dr. Weller at [facility].    6.  FOLLOW-UP: Patient to follow-up 2 weeks after his left intra-articular hip joint injection.  If he has questions or concerns in the meantime, he should not hesitate to  antionette.    Subjective:     Rasheed Hanson is a 78 year old male who presents today for follow-up regarding left hip pain and right shoulder pain.    Patient complains of chronic left hip pain.  Patient had a left intra-articular hip joint injection July 22, 2022.  Patient reports that injection provided 80% relief of pain for about 8 months.  Over the past 2 to 3 months, same pain returned.  Patient feels the pain on the left lateral hip, left groin, and left buttock.  Sometimes he feels it on the left iliac crest.  Occasionally the pain radiates down the left lateral thigh toward the knee.  Pain is aggravated with walking.  He also has increased pain when lying on his left side at night.  Pain is alleviated with sitting and stretching.  He has feels weakness and has difficulty lifting his left leg (flexing the hip) at the end of the day.    Patient complains next of right shoulder pain.  Patient reports that he had a rotator cuff tear status post repair 10 years ago.  He also had left shoulder surgery.  3 years ago he began to experience more right shoulder pain again.  He is not sure if he had an injury.  He cannot sleep on his right side because of the pain.  He cannot do as many push-ups as he would like because of his right shoulder pain.  He feels the pain on the anterior, posterior, and lateral aspect of the right shoulder.  He has difficulty reaching overhead, but can do it slowly.  He notices more restriction with reaching behind his back and reaching across his body on the right compared to the left.  When pain is severe he feels a tingling sensation in the right shoulder and sometimes he feels numbness and tingling in his right fingertips.    Overall, patient rates his pain today as a 1 out of 10.  At its worst it is a 5 out of 10.  As best it is a 0 out of 10.    Treatment to date:  - Bilateral shoulder surgery with Dr. Weller years ago  - Physical therapy for the low back and left hip July through  November 2020  - Physical therapy for left shoulder pain September through November 2020  - Left intra-articular hip joint injection under ultrasound guidance July 27, 2022 with 80% relief of pain x8 months  - Right L4-5 transforaminal epidural steroid injection May 13, 2022 which continues to provide relief of right-sided pain  - Gabapentin 100 to 200 mg at bedtime is helpful    Review of Systems:  Positive for numbness/tingling, weakness, pain much worse at night.  Negative for loss of bowel/bladder control, inability to urinate, headache, trip/stumble/falls, difficulty swallowing, difficulty with hand skills, fevers, unintentional weight loss.     Objective:   CONSTITUTIONAL:  Vital signs as above.  No acute distress.  The patient is well nourished and well groomed.    PSYCHIATRIC:  The patient is awake, alert, oriented to person, place and time.  The patient is answering questions appropriately with clear speech.  Normal affect.  HEENT: Normocephalic, atraumatic.  Sclera clear.    SKIN: Exposed skin is clean, dry, intact without rashes.  MUSCULOSKELETAL: The patient has 5/5 strength for the bilateral hip flexors, knee flexors/extensors, ankle dorsi/plantar flexors.  Tender to palpation left greater trochanter.  Tender to palpation about the right shoulder.  Range of motion of the left hip is mildly restricted with internal and external rotation.  Patient has restriction with internal and external rotation of the right shoulder.  He has difficulty reaching overhead on the right.  NEUROLOGICAL: Sensation light touch intact bilateral upper and lower extremities throughout.    RESULTS:    I reviewed the MRI lumbar spine dated April 29, 2022.  At L3-4 there is moderate spinal canal stenosis and moderate bilateral foraminal stenosis.  At L4-5 there is severe facet hypertrophy with grade 1 spondylolisthesis.  There is severe right and mild left foraminal stenosis at this level.

## 2023-06-23 ENCOUNTER — OFFICE VISIT (OUTPATIENT)
Dept: PHYSICAL MEDICINE AND REHAB | Facility: CLINIC | Age: 78
End: 2023-06-23
Payer: COMMERCIAL

## 2023-06-23 VITALS
HEART RATE: 54 BPM | HEIGHT: 66 IN | WEIGHT: 158.2 LBS | DIASTOLIC BLOOD PRESSURE: 67 MMHG | BODY MASS INDEX: 25.43 KG/M2 | SYSTOLIC BLOOD PRESSURE: 118 MMHG

## 2023-06-23 DIAGNOSIS — G89.29 CHRONIC RIGHT SHOULDER PAIN: ICD-10-CM

## 2023-06-23 DIAGNOSIS — M25.511 CHRONIC RIGHT SHOULDER PAIN: ICD-10-CM

## 2023-06-23 DIAGNOSIS — M25.552 HIP PAIN, LEFT: Primary | ICD-10-CM

## 2023-06-23 PROCEDURE — 99214 OFFICE O/P EST MOD 30 MIN: CPT | Performed by: PHYSICIAN ASSISTANT

## 2023-06-23 ASSESSMENT — PAIN SCALES - GENERAL: PAINLEVEL: NO PAIN (1)

## 2023-06-23 NOTE — LETTER
6/23/2023         RE: Rasheed Hanson  [address]  [place] [address]        Dear Colleague,    Thank you for referring your patient, Rasheed Hanson, to the [facility]. Please see a copy of my visit note below.    Assessment:   Rasheed Hanson is a 78 year old y.o. male with past medical history significant for gout, hyperlipidemia who presents today for follow-up regarding 2 areas of pain.  1.  Chronic right shoulder pain.  Patient has a history of rotator cuff tear 10 years ago status post surgical repair.  3 years ago he began to experience more shoulder pain again.  2.  Chronic left hip pain.  Patient is status post a left intra-articular hip joint injection under ultrasound guidance July 22, 2022 with Dr. Marks which provided 80% relief of his pain for about 8 months.  Over the past 2 to 3 months, same pain returned.       Plan:     A shared decision making plan was used.  The patient's values and choices were respected.  The following represents what was discussed and decided upon by the physician assistant and the patient.      1.  DIAGNOSTIC TESTS:    - Reviewed the MRI lumbar spine.  - No additional diagnostic test were ordered.  I will defer to orthopedics to order imaging for his right shoulder.    2.  PHYSICAL THERAPY: No physical therapy was ordered today.  Patient will continue with home exercises.  - Patient had physical therapy for the low back and left hip July through November 2022  - Patient had physical therapy for left shoulder pain September through November 2020    3.  MEDICATIONS: No changes are made to the patient's medications.  - Patient takes gabapentin 100 to 200 mg at bedtime    4.  INTERVENTIONS:    -I offer the patient repeat left intra-articular hip joint injection under ultrasound guidance with Dr. Marks.  Patient indicated he would like to proceed and an order was placed.    5.  REFERRALS: I entered a referral to orthopedics.   Patient would like to see his previous surgeon, Dr. Weller at [facility].    6.  FOLLOW-UP: Patient to follow-up 2 weeks after his left intra-articular hip joint injection.  If he has questions or concerns in the meantime, he should not hesitate to call.    Subjective:     Rasheed Hanson is a 78 year old male who presents today for follow-up regarding left hip pain and right shoulder pain.    Patient complains of chronic left hip pain.  Patient had a left intra-articular hip joint injection July 22, 2022.  Patient reports that injection provided 80% relief of pain for about 8 months.  Over the past 2 to 3 months, same pain returned.  Patient feels the pain on the left lateral hip, left groin, and left buttock.  Sometimes he feels it on the left iliac crest.  Occasionally the pain radiates down the left lateral thigh toward the knee.  Pain is aggravated with walking.  He also has increased pain when lying on his left side at night.  Pain is alleviated with sitting and stretching.  He has feels weakness and has difficulty lifting his left leg (flexing the hip) at the end of the day.    Patient complains next of right shoulder pain.  Patient reports that he had a rotator cuff tear status post repair 10 years ago.  He also had left shoulder surgery.  3 years ago he began to experience more right shoulder pain again.  He is not sure if he had an injury.  He cannot sleep on his right side because of the pain.  He cannot do as many push-ups as he would like because of his right shoulder pain.  He feels the pain on the anterior, posterior, and lateral aspect of the right shoulder.  He has difficulty reaching overhead, but can do it slowly.  He notices more restriction with reaching behind his back and reaching across his body on the right compared to the left.  When pain is severe he feels a tingling sensation in the right shoulder and sometimes he feels numbness and tingling in his right fingertips.    Overall,  patient rates his pain today as a 1 out of 10.  At its worst it is a 5 out of 10.  As best it is a 0 out of 10.    Treatment to date:  - Bilateral shoulder surgery with Dr. Weller years ago  - Physical therapy for the low back and left hip July through November 2020  - Physical therapy for left shoulder pain September through November 2020  - Left intra-articular hip joint injection under ultrasound guidance July 27, 2022 with 80% relief of pain x8 months  - Right L4-5 transforaminal epidural steroid injection May 13, 2022 which continues to provide relief of right-sided pain  - Gabapentin 100 to 200 mg at bedtime is helpful    Review of Systems:  Positive for numbness/tingling, weakness, pain much worse at night.  Negative for loss of bowel/bladder control, inability to urinate, headache, trip/stumble/falls, difficulty swallowing, difficulty with hand skills, fevers, unintentional weight loss.     Objective:   CONSTITUTIONAL:  Vital signs as above.  No acute distress.  The patient is well nourished and well groomed.    PSYCHIATRIC:  The patient is awake, alert, oriented to person, place and time.  The patient is answering questions appropriately with clear speech.  Normal affect.  HEENT: Normocephalic, atraumatic.  Sclera clear.    SKIN: Exposed skin is clean, dry, intact without rashes.  MUSCULOSKELETAL: The patient has 5/5 strength for the bilateral hip flexors, knee flexors/extensors, ankle dorsi/plantar flexors.  Tender to palpation left greater trochanter.  Tender to palpation about the right shoulder.  Range of motion of the left hip is mildly restricted with internal and external rotation.  Patient has restriction with internal and external rotation of the right shoulder.  He has difficulty reaching overhead on the right.  NEUROLOGICAL: Sensation light touch intact bilateral upper and lower extremities throughout.    RESULTS:    I reviewed the MRI lumbar spine dated April 29, 2022.  At L3-4 there is moderate  spinal canal stenosis and moderate bilateral foraminal stenosis.  At L4-5 there is severe facet hypertrophy with grade 1 spondylolisthesis.  There is severe right and mild left foraminal stenosis at this level.           Again, thank you for allowing me to participate in the care of your patient.        Sincerely,        Connie Juárez PA-C

## 2023-06-23 NOTE — PATIENT INSTRUCTIONS
A left hip joint injection has been ordered today.      Please note that this injection uses cortisone.  The cortisone may somewhat weaken the immune system.  It is unknown how much the immune system is weakened.  It is unknown if it is weakened to the point that you may be more likely to get the COVID-19 virus, or if you do get the COVID-19 virus, if you would be sicker than you would have been if you had not had the cortisone injection.  If you do not wish to proceed with the injection, please let the nurse/physician know and do NOT schedule the injection.    Please note that since your immune system is weakened from the cortisone, having any vaccine/shot may be less effective if you have this vaccine within 2 weeks from your cortisone injection.  It is advised to wait 2 weeks after your cortisone injection to have any vaccine (or if you have a vaccine first, wait 2 weeks before you have the cortisone injection).    Please schedule this injection at least 1 week  from now to allow time for insurance prior authorization.  On the day of your injection, you cannot be sick or taking antibiotics.  If you become sick and are prescribed, please call the clinic so your injection can be rescheduled for once you have completed your antibiotics.  You will need to bring a  with you for your injection.   If you have any questions or concerns prior to your injection, please do not hesitate to call the nurse navigation line at [phone] or contact Connie Juárez through Medium.

## 2023-08-04 ENCOUNTER — RADIOLOGY INJECTION OFFICE VISIT (OUTPATIENT)
Dept: PHYSICAL MEDICINE AND REHAB | Facility: CLINIC | Age: 78
End: 2023-08-04
Attending: PHYSICIAN ASSISTANT
Payer: COMMERCIAL

## 2023-08-04 ENCOUNTER — HOSPITAL ENCOUNTER (OUTPATIENT)
Dept: RADIOLOGY | Facility: HOSPITAL | Age: 78
Discharge: HOME | End: 2023-08-04
Attending: PHYSICAL MEDICINE & REHABILITATION | Admitting: PHYSICAL MEDICINE & REHABILITATION
Payer: COMMERCIAL

## 2023-08-04 VITALS — DIASTOLIC BLOOD PRESSURE: 65 MMHG | SYSTOLIC BLOOD PRESSURE: 110 MMHG | HEART RATE: 50 BPM

## 2023-08-04 DIAGNOSIS — M79.18 MYOFASCIAL PAIN: ICD-10-CM

## 2023-08-04 DIAGNOSIS — M25.511 CHRONIC RIGHT SHOULDER PAIN: ICD-10-CM

## 2023-08-04 DIAGNOSIS — G89.29 CHRONIC RIGHT SHOULDER PAIN: ICD-10-CM

## 2023-08-04 DIAGNOSIS — M25.511 CHRONIC RIGHT SHOULDER PAIN: Primary | ICD-10-CM

## 2023-08-04 DIAGNOSIS — M25.552 HIP PAIN, LEFT: ICD-10-CM

## 2023-08-04 DIAGNOSIS — G89.29 CHRONIC RIGHT SHOULDER PAIN: Primary | ICD-10-CM

## 2023-08-04 DIAGNOSIS — M16.12 PRIMARY OSTEOARTHRITIS OF LEFT HIP: ICD-10-CM

## 2023-08-04 PROCEDURE — 99214 OFFICE O/P EST MOD 30 MIN: CPT | Performed by: PHYSICAL MEDICINE & REHABILITATION

## 2023-08-04 PROCEDURE — 73030 X-RAY EXAM OF SHOULDER: CPT | Mod: RT

## 2023-08-04 ASSESSMENT — PAIN SCALES - GENERAL: PAINLEVEL: NO PAIN (1)

## 2023-08-04 NOTE — PATIENT INSTRUCTIONS
Right shoulder Xray ordered today.  Please call Radiology at [phone].       2. Continue with physical therapy.

## 2023-08-04 NOTE — PROGRESS NOTES
Assessment/Plan:      Rasheed was seen today for injections.    Diagnoses and all orders for this visit:    Chronic right shoulder pain  -     Cancel: XR Shoulder Right 2 Views; Future  -     Cancel: XR Shoulder Right 2 Views; Future    Myofascial pain  -     Cancel: XR Shoulder Right 2 Views; Future    Hip pain, left    Primary osteoarthritis of left hip    Other orders  -     PAIN US Large Joint Injection Unilateral         Assessment: Pleasant 78 year old male  with past medical history significant for GERD, gout, BPH  with:     1.  Right shoulder pain, chronic, likely glenohumeral joint osteoarthritis.  History of right shoulder surgery.    2.  Myofascial pain right parascapular region.    3.  Left hip pain related to primary hip osteoarthritis.  Resolved.    4.  Some left greater trochanter pain consistent with trochanteric bursitis.          Discussion:    1.  I discussed the diagnosis and treatment options.  With regards to the left hip injection, his pain is markedly improved he has a little tenderness along the IT band may have some greater trochanteric bursitis and IT band syndrome but overall his hip is doing quite well.  We discussed the risks and benefits of doing versus not performing the injection and he agreed to hold off on the left hip injection.    2.  History of right shoulder surgery with significant right shoulder pain over the past several weeks.  Decreased range of motion question glenohumeral joint osteoarthritis versus rotator cuff impingement.  Likely has greater fairly significant glenohumeral joint osteoarthritis.  We will obtain plain films of the right shoulder to evaluate.    3.  He is in physical therapy continue physical therapy for now.    4.    [facility] and discuss treatment such as right glenohumeral joint injection.           It was our pleasure caring for your patient today, if there any questions or concerns please do not hesitate to  contact us.      Subjective:   Patient ID: Rasheed Hanson is a 78 year old male.    History of Present Illness: Patient presents for left hip injection today at the request of Connie Juárez.  He has left hip osteoarthritis which is relatively mild and is had some significant pain in the left hip along the greater trochanter.  Was originally scheduled for left hip injection under ultrasound however his pain is markedly improved.  Still gets some groin pain with prolonged walking but has not bothered her much recently and also only mild pain along the lateral thigh IT band region but that is well-tolerated now.    Over the past few weeks he has had increased pain in the right shoulder with laying on his right side to compensate for the left hip.  Significant right shoulder pain worse with using his arm raising over his head.  He has right upper trapezius pain radiating to the deltoid with any movement of the arm.  No radiation down the arm.  No paresthesias or weakness.  History of shoulder surgery in the past.  No recent imaging or injections.  Has had physical therapy and trigger point injections.     Images: Lumbar MRI coronal imaging reveals mild osteoarthritis of the left hip.      I did review CT chest from May 2018 images and report which revealed middle lobe groundglass opacities which resolved  Per radiology.  I was able to evaluate his glenohumeral joints there is sufficient joint space throughout the bilateral glenohumeral joints although appears to have some mild subchondral sclerosis of the right humeral head      Review of Systems:  Patient denies fever, chills, sweats, recent illnesses or antibiotics.       Past Medical History:   Diagnosis Date    Acute gouty arthropathy     Created by Conversion     Bronchitis, not specified as acute or chronic     Created by Conversion     Dermatophytosis of foot     Created by Conversion     Enthesopathy     Created by Conversion  Replacement Utility updated for  latest IMO load    Infective otitis externa     Created by Conversion  Replacement Utility updated for latest IMO load    Other and unspecified hyperlipidemia     Created by Conversion     Thoracic or lumbosacral neuritis or radiculitis, unspecified     Created by Conversion        The following portions of the patient's history were reviewed and updated as appropriate: allergies, current medications, past family history, past medical history, past social history, past surgical history and problem list.           Objective:   Physical Exam:    /65   Pulse 50   There is no height or weight on file to calculate BMI.      General: Alert and oriented with normal affect. Attention, knowledge, memory, and language are intact. No acute distress.   Eyes: Sclerae are clear.  Respirations: Unlabored. CV: No lower extremity edema.  Skin: No rashes seen.    Gait:  Nonantalgic.  No skin tenderness over the left greater trochanter but has some tenderness over the IT band slightly distal to the greater trochanter.  Decreased right shoulder abduction.  Tenderness over the supraspinatus.  Decreased internal rotation of the right arm more affected than external rotation positive Taveras and Neer's on the right.  Sensation is intact to light touch throughout the upper  extremities.  Reflexes are   negative Hoffmans.      Manual muscle testing reveals:  Right /Left out of 5     5/5 elbow flexors  5/5 elbow extensors  5/5 wrist extensors  5/5 interosseus  5/5 finger flexors

## 2023-08-08 DIAGNOSIS — M25.511 CHRONIC RIGHT SHOULDER PAIN: Primary | ICD-10-CM

## 2023-08-08 DIAGNOSIS — G89.29 CHRONIC RIGHT SHOULDER PAIN: Primary | ICD-10-CM

## 2023-08-09 DIAGNOSIS — M19.011 OSTEOARTHRITIS OF GLENOHUMERAL JOINT, RIGHT: Primary | ICD-10-CM

## 2023-09-12 DIAGNOSIS — N40.1 NOCTURIA ASSOCIATED WITH BENIGN PROSTATIC HYPERPLASIA: ICD-10-CM

## 2023-09-12 DIAGNOSIS — M54.16 LUMBAR RADICULAR PAIN: ICD-10-CM

## 2023-09-12 DIAGNOSIS — M1A.9XX0 CHRONIC GOUT WITHOUT TOPHUS, UNSPECIFIED CAUSE, UNSPECIFIED SITE: ICD-10-CM

## 2023-09-12 DIAGNOSIS — R35.1 NOCTURIA ASSOCIATED WITH BENIGN PROSTATIC HYPERPLASIA: ICD-10-CM

## 2023-09-12 RX ORDER — ALLOPURINOL 100 MG/1
100 TABLET ORAL DAILY
Qty: 90 TABLET | Refills: 3 | Status: SHIPPED | OUTPATIENT
Start: 2023-09-12

## 2023-09-12 RX ORDER — TAMSULOSIN HYDROCHLORIDE 0.4 MG/1
0.4 CAPSULE ORAL DAILY
Qty: 90 CAPSULE | Refills: 3 | Status: SHIPPED | OUTPATIENT
Start: 2023-09-12

## 2023-09-12 RX ORDER — GABAPENTIN 100 MG/1
100 CAPSULE ORAL AT BEDTIME
Qty: 90 CAPSULE | Refills: 3 | Status: SHIPPED | OUTPATIENT
Start: 2023-09-12

## 2023-10-06 ENCOUNTER — RADIOLOGY INJECTION OFFICE VISIT (OUTPATIENT)
Dept: PHYSICAL MEDICINE AND REHAB | Facility: CLINIC | Age: 78
End: 2023-10-06
Attending: PHYSICAL MEDICINE & REHABILITATION
Payer: COMMERCIAL

## 2023-10-06 VITALS — DIASTOLIC BLOOD PRESSURE: 70 MMHG | HEART RATE: 80 BPM | SYSTOLIC BLOOD PRESSURE: 125 MMHG | TEMPERATURE: 97.8 F

## 2023-10-06 DIAGNOSIS — M19.011 OSTEOARTHRITIS OF GLENOHUMERAL JOINT, RIGHT: ICD-10-CM

## 2023-10-06 PROCEDURE — 20611 DRAIN/INJ JOINT/BURSA W/US: CPT | Mod: RT | Performed by: PHYSICAL MEDICINE & REHABILITATION

## 2023-10-06 RX ORDER — LIDOCAINE HYDROCHLORIDE 10 MG/ML
INJECTION, SOLUTION EPIDURAL; INFILTRATION; INTRACAUDAL; PERINEURAL
Status: COMPLETED | OUTPATIENT
Start: 2023-10-06 | End: 2023-10-06

## 2023-10-06 RX ORDER — ROPIVACAINE HYDROCHLORIDE 5 MG/ML
INJECTION, SOLUTION EPIDURAL; INFILTRATION; PERINEURAL
Status: COMPLETED | OUTPATIENT
Start: 2023-10-06 | End: 2023-10-06

## 2023-10-06 RX ORDER — METHYLPREDNISOLONE ACETATE 40 MG/ML
INJECTION, SUSPENSION INTRA-ARTICULAR; INTRALESIONAL; INTRAMUSCULAR; SOFT TISSUE
Status: COMPLETED | OUTPATIENT
Start: 2023-10-06 | End: 2023-10-06

## 2023-10-06 RX ADMIN — LIDOCAINE HYDROCHLORIDE 1 ML: 10 INJECTION, SOLUTION EPIDURAL; INFILTRATION; INTRACAUDAL; PERINEURAL at 10:02

## 2023-10-06 RX ADMIN — METHYLPREDNISOLONE ACETATE 40 MG: 40 INJECTION, SUSPENSION INTRA-ARTICULAR; INTRALESIONAL; INTRAMUSCULAR; SOFT TISSUE at 10:02

## 2023-10-06 RX ADMIN — ROPIVACAINE HYDROCHLORIDE 2 ML: 5 INJECTION, SOLUTION EPIDURAL; INFILTRATION; PERINEURAL at 10:02

## 2023-10-06 ASSESSMENT — PAIN SCALES - GENERAL: PAINLEVEL: MILD PAIN (2)

## 2023-10-06 NOTE — PATIENT INSTRUCTIONS
Trudy Munson, DO                                                    Padmini Chan, HALEIGH Marks,  DO                                                                                DEANA Stevens, DO                                                                                       DISCHARGE INSTRUCTIONS  During office hours (8:00 a.m.- 4:30 p.m.) questions or concerns may be answered  by calling Spine Navigation Nurses at [phone]. If you experience any problems after hours please call [phone] and you will be connected to [facility].     All Patients:  You may experience an increase in your symptoms or have some soreness from the injection for the first 2 days (It may take anywhere between 2 days- 2 weeks for the steroid to have maximum effect).  You may use ice on the injection site, as frequently as 20 minutes each hour if needed.  You may continue taking your regular medication.  You may shower. No swimming, tub bath or hot tub for 48 hours.  You may remove your   bandaid/bandage as soon as you are home.  You may resume light activities, as tolerated unless otherwise directed.  Resume your usual diet as tolerated.      POSSIBLE STEROID SIDE EFFECTS   (If steroid/cortisone was used for your procedure)  -If you experience these symptoms, it should only last for a short period  Swelling of the legs        Skin redness (flushing)  Mouth (oral) irritation   Blood sugar (glucose) levels      Sweats                          Mood changes  Severe headache                  Sleeplessness            POSSIBLE PROCEDURE SIDE EFFECTS  -Call the Spine Center if you are concerned  Increased Pain      Bruising/bleeding at site                  Redness or swelling  Fever greater than 100.5            Diffuse rash            THESE INSTRUCTIONS HAVE BEEN EXPLAINED TO THE PATIENT AND THE PATIENT/PATIENT REPRESENTATITVE HAS VERBALIZED UNDERSTANDING.  A COPY OF THE  INSTRUCTIONS HAVE BEEN GIVEN TO THE PATIENT/PATIENT REPRESENTATIVE.

## 2023-10-06 NOTE — PROGRESS NOTES
Assessment/Plan:      Rasheed was seen today for injections.    Diagnoses and all orders for this visit:    Osteoarthritis of glenohumeral joint, right  -     PAIN US Large Joint Injection Unilateral  -     [place] ARTHROCENTESIS ASPIR&/INJ MAJOR JT/BURSA W/US         Assessment: Pleasant 78 year old male  with past medical history significant for GERD, gout, BPH  with:     1.  Right shoulder pain, chronic, likely glenohumeral joint osteoarthritis.  History of right shoulder surgery.             Discussion:    1.  Patient presents for right glenohumeral joint injection today under ultrasound.  Agrees to proceed.  Please see attached procedure note.      It was our pleasure caring for your patient today, if there any questions or concerns please do not hesitate to contact us.      Subjective:   Patient ID: Rasheed Hanson is a 78 year old male.    History of Present Illness:  Patient presents today for right glenohumeral joint injection under ultrasound.  Having right shoulder pain with internal/external rotation and abduction.           Review of Systems:  Denies fevers, chills, sweats, recent illnesses or antibiotics.         Past Medical History:   Diagnosis Date    Acute gouty arthropathy     Created by Conversion     Bronchitis, not specified as acute or chronic     Created by Conversion     Dermatophytosis of foot     Created by Conversion     Enthesopathy     Created by Conversion  Replacement Utility updated for latest IMO load    Infective otitis externa     Created by Conversion  Replacement Utility updated for latest IMO load    Other and unspecified hyperlipidemia     Created by Conversion     Thoracic or lumbosacral neuritis or radiculitis, unspecified     Created by Conversion        The following portions of the patient's history were reviewed and updated as appropriate: allergies, current medications, past family history, past medical history, past social history, past surgical history and problem  list.           Objective:   Physical Exam:    /70   Pulse 80   Temp 97.8  F (36.6  C) (Oral)   There is no height or weight on file to calculate BMI.      General: Alert and oriented with normal affect. Attention, knowledge, memory, and language are intact. No acute distress.   Eyes: Sclerae are clear.  Respirations: Unlabored.  Skin: No rashes about the right shoulder.  Gait:  Nonantalgic  Decreased range of motion right shoulder internal/external rotation.  Sensation is intact to light touch throughout the upper   extremities.  Reflexes are negative Hoffmans.      Manual muscle testing reveals:  Right /Left out of 5     5/5 elbow flexors  5/5 elbow extensors  5/5 wrist extensors  5/5 interosseus  5/5 finger flexors       Procedure Note:    After discussing the risks and benefits of a right glenohumeral joint injection under ultrasound guidance, informed consent was obtained. The patient and physician agreed on the injection site prior to the procedure.  A verbal timeout was done prior to the procedure.  With the patient seated, from posterior lateral approach, The right shoulder region was surveyed with the ultrasound unit to identify the target.  The skin was marked and prepped with chloraprep.  The skin was then anesthetized with a skin wheal followed by infiltration with 1 mL of 1% lidocaine.  Under direct ultrasound visualization, a 25-gauge 2 inch needle was used to inject 3 milliliters of injectate containing 2 milliliters of 0.5% ropivacaine and 1 milliliter containing 40 mg of depo-medrol after aspiration was negative for heme. The patient tolerated the procedure well and there no immediate complications. Images stored in PACS.

## 2023-11-01 ENCOUNTER — TRANSFERRED RECORDS (OUTPATIENT)
Dept: HEALTH INFORMATION MANAGEMENT | Facility: CLINIC | Age: 78
End: 2023-11-01
Payer: COMMERCIAL

## 2023-11-09 ENCOUNTER — TRANSFERRED RECORDS (OUTPATIENT)
Dept: HEALTH INFORMATION MANAGEMENT | Facility: CLINIC | Age: 78
End: 2023-11-09
Payer: COMMERCIAL

## 2023-11-20 ENCOUNTER — MYC MEDICAL ADVICE (OUTPATIENT)
Dept: FAMILY MEDICINE | Facility: CLINIC | Age: 78
End: 2023-11-20
Payer: COMMERCIAL

## 2023-11-20 DIAGNOSIS — Z13.220 SCREENING FOR HYPERLIPIDEMIA: ICD-10-CM

## 2023-11-20 DIAGNOSIS — R29.2 ABNORMAL REFLEX: ICD-10-CM

## 2023-11-20 DIAGNOSIS — M10.9 ACUTE GOUT, UNSPECIFIED CAUSE, UNSPECIFIED SITE: Primary | ICD-10-CM

## 2023-11-20 ASSESSMENT — ENCOUNTER SYMPTOMS
DIZZINESS: 0
PALPITATIONS: 0
DYSURIA: 0
FEVER: 0
JOINT SWELLING: 0
MYALGIAS: 1
COUGH: 0
PARESTHESIAS: 0
DIARRHEA: 0
HEMATOCHEZIA: 0
HEADACHES: 0
CONSTIPATION: 0
WEAKNESS: 0
NERVOUS/ANXIOUS: 1
FREQUENCY: 0
SORE THROAT: 0
EYE PAIN: 0
ABDOMINAL PAIN: 0
ARTHRALGIAS: 1
HEARTBURN: 1
HEMATURIA: 0
SHORTNESS OF BREATH: 0
NAUSEA: 0
CHILLS: 0

## 2023-11-20 ASSESSMENT — ACTIVITIES OF DAILY LIVING (ADL): CURRENT_FUNCTION: NO ASSISTANCE NEEDED

## 2023-11-24 ENCOUNTER — LAB (OUTPATIENT)
Dept: LAB | Facility: CLINIC | Age: 78
End: 2023-11-24
Payer: COMMERCIAL

## 2023-11-24 DIAGNOSIS — R73.09 ELEVATED GLUCOSE: ICD-10-CM

## 2023-11-24 DIAGNOSIS — E03.8 OTHER SPECIFIED HYPOTHYROIDISM: Primary | ICD-10-CM

## 2023-11-24 DIAGNOSIS — R29.2 ABNORMAL REFLEX: ICD-10-CM

## 2023-11-24 DIAGNOSIS — M10.9 ACUTE GOUT, UNSPECIFIED CAUSE, UNSPECIFIED SITE: ICD-10-CM

## 2023-11-24 DIAGNOSIS — Z13.220 SCREENING FOR HYPERLIPIDEMIA: ICD-10-CM

## 2023-11-24 LAB
ALBUMIN SERPL BCG-MCNC: 4.1 G/DL (ref 3.5–5.2)
ALP SERPL-CCNC: 78 U/L (ref 40–150)
ALT SERPL W P-5'-P-CCNC: 20 U/L (ref 0–70)
ANION GAP SERPL CALCULATED.3IONS-SCNC: 10 MMOL/L (ref 7–15)
AST SERPL W P-5'-P-CCNC: 28 U/L (ref 0–45)
BASOPHILS # BLD AUTO: 0.1 10E3/UL (ref 0–0.2)
BASOPHILS NFR BLD AUTO: 1 %
BILIRUB SERPL-MCNC: 0.7 MG/DL
BUN SERPL-MCNC: 23.8 MG/DL (ref 8–23)
CALCIUM SERPL-MCNC: 9.5 MG/DL (ref 8.8–10.2)
CHLORIDE SERPL-SCNC: 105 MMOL/L (ref 98–107)
CHOLEST SERPL-MCNC: 200 MG/DL
CREAT SERPL-MCNC: 1.25 MG/DL (ref 0.67–1.17)
DEPRECATED HCO3 PLAS-SCNC: 25 MMOL/L (ref 22–29)
EGFRCR SERPLBLD CKD-EPI 2021: 59 ML/MIN/1.73M2
EOSINOPHIL # BLD AUTO: 0.4 10E3/UL (ref 0–0.7)
EOSINOPHIL NFR BLD AUTO: 6 %
ERYTHROCYTE [DISTWIDTH] IN BLOOD BY AUTOMATED COUNT: 13 % (ref 10–15)
GLUCOSE SERPL-MCNC: 114 MG/DL (ref 70–99)
HCT VFR BLD AUTO: 44.4 % (ref 40–53)
HDLC SERPL-MCNC: 82 MG/DL
HGB BLD-MCNC: 14.9 G/DL (ref 13.3–17.7)
IMM GRANULOCYTES # BLD: 0 10E3/UL
IMM GRANULOCYTES NFR BLD: 0 %
LDLC SERPL CALC-MCNC: 106 MG/DL
LYMPHOCYTES # BLD AUTO: 1.7 10E3/UL (ref 0.8–5.3)
LYMPHOCYTES NFR BLD AUTO: 26 %
MCH RBC QN AUTO: 32.4 PG (ref 26.5–33)
MCHC RBC AUTO-ENTMCNC: 33.6 G/DL (ref 31.5–36.5)
MCV RBC AUTO: 97 FL (ref 78–100)
MONOCYTES # BLD AUTO: 0.7 10E3/UL (ref 0–1.3)
MONOCYTES NFR BLD AUTO: 10 %
NEUTROPHILS # BLD AUTO: 3.9 10E3/UL (ref 1.6–8.3)
NEUTROPHILS NFR BLD AUTO: 58 %
NONHDLC SERPL-MCNC: 118 MG/DL
PLATELET # BLD AUTO: 242 10E3/UL (ref 150–450)
POTASSIUM SERPL-SCNC: 4.7 MMOL/L (ref 3.4–5.3)
PROT SERPL-MCNC: 6.6 G/DL (ref 6.4–8.3)
RBC # BLD AUTO: 4.6 10E6/UL (ref 4.4–5.9)
SODIUM SERPL-SCNC: 140 MMOL/L (ref 135–145)
T4 FREE SERPL-MCNC: 0.97 NG/DL (ref 0.9–1.7)
TRIGL SERPL-MCNC: 59 MG/DL
TSH SERPL DL<=0.005 MIU/L-ACNC: 10.4 UIU/ML (ref 0.3–4.2)
URATE SERPL-MCNC: 4.6 MG/DL (ref 3.4–7)
WBC # BLD AUTO: 6.7 10E3/UL (ref 4–11)

## 2023-11-24 PROCEDURE — 84439 ASSAY OF FREE THYROXINE: CPT

## 2023-11-24 PROCEDURE — 85025 COMPLETE CBC W/AUTO DIFF WBC: CPT

## 2023-11-24 PROCEDURE — 36415 COLL VENOUS BLD VENIPUNCTURE: CPT

## 2023-11-24 PROCEDURE — 84550 ASSAY OF BLOOD/URIC ACID: CPT

## 2023-11-24 PROCEDURE — 80053 COMPREHEN METABOLIC PANEL: CPT

## 2023-11-24 PROCEDURE — 84443 ASSAY THYROID STIM HORMONE: CPT

## 2023-11-24 PROCEDURE — 80061 LIPID PANEL: CPT

## 2023-11-24 PROCEDURE — 83036 HEMOGLOBIN GLYCOSYLATED A1C: CPT

## 2023-11-24 RX ORDER — LEVOTHYROXINE SODIUM 50 UG/1
50 TABLET ORAL DAILY
Qty: 90 TABLET | Refills: 3 | Status: SHIPPED | OUTPATIENT
Start: 2023-11-24 | End: 2023-12-19

## 2023-11-25 ENCOUNTER — HEALTH MAINTENANCE LETTER (OUTPATIENT)
Age: 78
End: 2023-11-25

## 2023-11-27 ENCOUNTER — OFFICE VISIT (OUTPATIENT)
Dept: FAMILY MEDICINE | Facility: CLINIC | Age: 78
End: 2023-11-27
Payer: COMMERCIAL

## 2023-11-27 VITALS
HEART RATE: 54 BPM | TEMPERATURE: 97.3 F | RESPIRATION RATE: 12 BRPM | OXYGEN SATURATION: 99 % | WEIGHT: 163.2 LBS | DIASTOLIC BLOOD PRESSURE: 68 MMHG | HEIGHT: 66 IN | SYSTOLIC BLOOD PRESSURE: 118 MMHG | BODY MASS INDEX: 26.23 KG/M2

## 2023-11-27 DIAGNOSIS — E03.8 OTHER SPECIFIED HYPOTHYROIDISM: ICD-10-CM

## 2023-11-27 DIAGNOSIS — C61 MALIGNANT TUMOR OF PROSTATE (H): ICD-10-CM

## 2023-11-27 DIAGNOSIS — Z00.00 ENCOUNTER FOR MEDICARE ANNUAL WELLNESS EXAM: Primary | ICD-10-CM

## 2023-11-27 DIAGNOSIS — N18.31 STAGE 3A CHRONIC KIDNEY DISEASE (H): ICD-10-CM

## 2023-11-27 DIAGNOSIS — M41.56 SCOLIOSIS OF LUMBAR REGION DUE TO DEGENERATIVE DISEASE OF SPINE IN ADULT: ICD-10-CM

## 2023-11-27 DIAGNOSIS — R73.09 ELEVATED GLUCOSE: ICD-10-CM

## 2023-11-27 LAB — HBA1C MFR BLD: 5.1 % (ref 0–5.6)

## 2023-11-27 PROCEDURE — G0439 PPPS, SUBSEQ VISIT: HCPCS | Performed by: INTERNAL MEDICINE

## 2023-11-27 PROCEDURE — 99214 OFFICE O/P EST MOD 30 MIN: CPT | Mod: 25 | Performed by: INTERNAL MEDICINE

## 2023-11-27 ASSESSMENT — ENCOUNTER SYMPTOMS
DIZZINESS: 0
PALPITATIONS: 0
FREQUENCY: 0
SORE THROAT: 0
CHILLS: 0
NAUSEA: 0
EYE PAIN: 0
MYALGIAS: 1
FEVER: 0
ARTHRALGIAS: 1
SHORTNESS OF BREATH: 0
DIARRHEA: 0
JOINT SWELLING: 0
PARESTHESIAS: 0
ABDOMINAL PAIN: 0
HEMATURIA: 0
HEARTBURN: 1
HEADACHES: 0
NERVOUS/ANXIOUS: 1
HEMATOCHEZIA: 0
CONSTIPATION: 0
COUGH: 0
WEAKNESS: 0
DYSURIA: 0

## 2023-11-27 ASSESSMENT — ACTIVITIES OF DAILY LIVING (ADL): CURRENT_FUNCTION: NO ASSISTANCE NEEDED

## 2023-11-27 NOTE — PROGRESS NOTES
"SUBJECTIVE:   Wilber is a 78 year old, presenting for the following:  No chief complaint on file.        11/27/2023     2:17 PM   Additional Questions   Roomed by Elizabeth MERCADO MA       Are you in the first 12 months of your Medicare coverage?  No    Healthy Habits:     In general, how would you rate your overall health?  Good    Frequency of exercise:  2-3 days/week    Duration of exercise:  Greater than 60 minutes    Do you usually eat at least 4 servings of fruit and vegetables a day, include whole grains    & fiber and avoid regularly eating high fat or \"junk\" foods?  Yes    Taking medications regularly:  Yes    Medication side effects:  Not applicable    Ability to successfully perform activities of daily living:  No assistance needed    Home Safety:  No safety concerns identified    Hearing Impairment:  No hearing concerns    In the past 6 months, have you been bothered by leaking of urine?  No    In general, how would you rate your overall mental or emotional health?  Good    Additional concerns today:  No      Elevated TSH- noted slight weight gain. Ok with starting the levothyroxine- wife on armour thyroid and may want to consider this.    Using gabapentin once at night- feels working with pain at night, using tumeric as well.     Prostate cancer- following PSA testing, was not interested in radiation before, did not tolerate biopsy well.     Today's PHQ-2 Score:       11/26/2023     3:42 PM   PHQ-2 ( 1999 [employer])   Q1: Little interest or pleasure in doing things 0   Q2: Feeling down, depressed or hopeless 0   PHQ-2 Score 0   Q1: Little interest or pleasure in doing things Not at all   Q2: Feeling down, depressed or hopeless Not at all   PHQ-2 Score 0           Have you ever done Advance Care Planning? (For example, a Health Directive, POLST, or a discussion with a medical provider or your loved ones about your wishes): Yes, patient states has an Advance Care Planning document and will bring a copy to the " clinic.       Fall risk  Fallen 2 or more times in the past year?: No  Any fall with injury in the past year?: No    Cognitive Screening   1) Repeat 3 items (Leader, Season, Table)    2) Clock draw: NORMAL  3) 3 item recall: Recalls 2 objects   Results: NORMAL clock, 1-2 items recalled: COGNITIVE IMPAIRMENT LESS LIKELY    Mini-CogTM Copyright RANDEE Pedroza. Licensed by the author for use in [facility]; reprinted with permission (sudeep@[facility]). All rights reserved.      Do you have sleep apnea, excessive snoring or daytime drowsiness? : yes    Reviewed and updated as needed this visit by clinical staff   Tobacco  Allergies  Meds  Problems  Med Hx  Surg Hx  Fam Hx          Reviewed and updated as needed this visit by Provider   Tobacco  Allergies  Meds  Problems  Med Hx  Surg Hx  Fam Hx         Social History     Tobacco Use    Smoking status: Never     Passive exposure: Never    Smokeless tobacco: Never   Substance Use Topics    Alcohol use: Yes     Alcohol/week: 14.0 standard drinks of alcohol             11/20/2023     9:48 AM   Alcohol Use   Prescreen: >3 drinks/day or >7 drinks/week? No     Do you have a current opioid prescription? No  Do you use any other controlled substances or medications that are not prescribed by a provider? None              Current providers sharing in care for this patient include:   Patient Care Team:  Myke Yates MD as PCP - General (Internal Medicine - Pediatrics)  Trudy Munson DO as Assigned Neuroscience Provider  Myke Yates MD as Assigned PCP  Siobhan Almazan PA-C as Physician Assistant (Urology)    The following health maintenance items are reviewed in Epic and correct as of today:  Health Maintenance   Topic Date Due    MICROALBUMIN  Never done    RSV VACCINE (Pregnancy & 60+) (1 - 1-dose 60+ series) Never done    INFLUENZA VACCINE (1) Never done    COVID-19 Vaccine (5 - 2023-24 season) 09/01/2023    MEDICARE ANNUAL  "WELLNESS VISIT  10/20/2023    BMP  11/24/2024    LIPID  11/24/2024    TSH W/FREE T4 REFLEX  11/24/2024    HEMOGLOBIN  11/24/2024    ANNUAL REVIEW OF HM ORDERS  11/27/2024    FALL RISK ASSESSMENT  11/27/2024    ADVANCE CARE PLANNING  11/27/2028    DTAP/TDAP/TD IMMUNIZATION (3 - Td or Tdap) 08/18/2030    PHQ-2 (once per calendar year)  Completed    Pneumococcal Vaccine: 65+ Years  Completed    URINALYSIS  Completed    IPV IMMUNIZATION  Aged Out    HPV IMMUNIZATION  Aged Out    MENINGITIS IMMUNIZATION  Aged Out    RSV MONOCLONAL ANTIBODY  Aged Out    HEPATITIS C SCREENING  Discontinued    COLORECTAL CANCER SCREENING  Discontinued    ZOSTER IMMUNIZATION  Discontinued               Review of Systems   Constitutional:  Negative for chills and fever.   HENT:  Positive for ear pain. Negative for congestion, hearing loss and sore throat.    Eyes:  Negative for pain and visual disturbance.   Respiratory:  Negative for cough and shortness of breath.    Cardiovascular:  Negative for chest pain, palpitations and peripheral edema.   Gastrointestinal:  Positive for heartburn. Negative for abdominal pain, constipation, diarrhea, hematochezia and nausea.   Genitourinary:  Positive for impotence and urgency. Negative for dysuria, frequency, genital sores, hematuria and penile discharge.   Musculoskeletal:  Positive for arthralgias and myalgias. Negative for joint swelling.   Skin:  Negative for rash.   Neurological:  Negative for dizziness, weakness, headaches and paresthesias.   Psychiatric/Behavioral:  Negative for mood changes. The patient is nervous/anxious.          OBJECTIVE:   /68 (BP Location: Left arm, Patient Position: Sitting, Cuff Size: Adult Regular)   Pulse 54   Temp 97.3  F (36.3  C) (Temporal)   Resp 12   Ht 1.676 m (5' 6\")   Wt 74 kg (163 lb 3.2 oz)   SpO2 99%   BMI 26.34 kg/m   Estimated body mass index is 26.34 kg/m  as calculated from the following:    Height as of this encounter: 1.676 m (5' 6\").    " Weight as of this encounter: 74 kg (163 lb 3.2 oz).  Physical Exam  GENERAL: healthy, alert and no distress  EYES: Eyes grossly normal to inspection, PERRL and conjunctivae and sclerae normal  HENT: ear canals and TM's normal, nose and mouth without ulcers or lesions  NECK: no adenopathy, no asymmetry, masses, or scars and thyroid normal to palpation  RESP: lungs clear to auscultation - no rales, rhonchi or wheezes  CV: regular rate and rhythm, normal S1 S2, no S3 or S4, no murmur, click or rub, no peripheral edema and peripheral pulses strong  ABDOMEN: soft, nontender, no hepatosplenomegaly, no masses and bowel sounds normal  MS: no gross musculoskeletal defects noted, no edema  SKIN: no suspicious lesions or rashes  PSYCH: mentation appears normal, affect normal/bright    Diagnostic Test Results:  Labs reviewed in Epic    ASSESSMENT / PLAN:       ICD-10-CM    1. Encounter for Medicare annual wellness exam  Z00.00 Reviewed recent labs with patient. Discussed routine health guidance      2. Stage 3a chronic kidney disease (H)  N18.31 Basic metabolic panel  (Ca, Cl, CO2, Creat, Gluc, K, Na, BUN)   Noted recent trend- will continue to follow  Albumin Random Urine Quantitative with Creat Ratio      3. Elevated glucose  R73.09 Hemoglobin A1c   Normal A1C   4. Malignant tumor of prostate (H)  C61 Following with urology, does not wish to have more biopsy done. Following PSA numbers      5. Other hyperlipidemia  E78.49 LDL in ok range      6. Other specified hypothyroidism  E03.8 Starting levothyroxine based on last labs- trending up over time. Patient notes that he may want armour thyroid- discussed this can be harder to keep consistent results. He will start levothyroxine      7. Scoliosis of lumbar region due to degenerative disease of spine in adult  M41.56 Stable on nightly low dose gabapentin                COUNSELING:  Reviewed preventive health counseling, as reflected in patient instructions      BMI:   Estimated  "body mass index is 26.34 kg/m  as calculated from the following:    Height as of this encounter: 1.676 m (5' 6\").    Weight as of this encounter: 74 kg (163 lb 3.2 oz).         He reports that he has never smoked. He has never been exposed to tobacco smoke. He has never used smokeless tobacco.      Appropriate preventive services were discussed with this patient, including applicable screening as appropriate for fall prevention, nutrition, physical activity, Tobacco-use cessation, weight loss and cognition.  Checklist reviewing preventive services available has been given to the patient.    Reviewed patients plan of care and provided an AVS. The Basic Care Plan (routine screening as documented in Health Maintenance) for Rasheed meets the Care Plan requirement. This Care Plan has been established and reviewed with the Patient.        Myke Yates MD  [facility]    Identified Health Risks:  I have reviewed Opioid Use Disorder and Substance Use Disorder risk factors and made any needed referrals.   "

## 2023-11-27 NOTE — PATIENT INSTRUCTIONS
We will continue to follow your kidney function over time.    Can start with 25 mcg per day of the levothyroxine for a week or two then can increase to 50 mcg per day after that.     We will do labs in 2 months to recheck the kidneys, the thyroid as well.This can be a lab only appointment.     I will add on lab to check the blood sugar more (A1C).     Check at the pharmacy for the RSV vaccine. This can help prevent significant lung infections for you. Medicare typically does not cover this in clinic and may cover it at your pharmacy.    Keep up the good work with staying active.     Myke Yates MD

## 2023-11-27 NOTE — PROGRESS NOTES
Assessment:   Rasheed Hanson is a 78 y.o. male with past medical history significant for prostate cancer, gout, hyperlipidemia who presents today for follow-up regarding 4 areas of pain:  1.  Chronic right shoulder pain.  Patient has a history of rotator cuff tear 10 years ago status post surgical repair.  3 years ago he began to experience more shoulder pain again.  Patient is status post a right glenohumeral joint injection under ultrasound guidance October 6, 2023 which has provided 90% relief of pain.  2.  Chronic left hip pain due to osteoarthritis.  Patient is status post a left intra-articular hip joint injection under ultrasound guidance July 22, 2022 with Dr. Marks which provided 80% relief of his pain for about 8 months.  Pain is gradually worsening.  3.  Chronic left low back pain. My review of an MRI lumbar spine from April 2022 shows multilevel spondylosis.  At L3-4 there is moderate spinal canal stenosis and moderate bilateral foraminal stenosis.  At L4-5 there is severe facet hypertrophy with grade 1 spondylolisthesis.  There is severe right and mild left foraminal stenosis at this level.  Suspect he is symptomatic from the lower lumbar facet arthropathy.  He had reproduction of pain with lumbar facet loading maneuvers on the left.  3.  Chronic left axial neck pain.  Patient has not had any imaging of the cervical spine.  Suspect osteoarthritis affecting the upper cervical facets.  - Patient is neurologically intact.       Plan:     A shared decision making plan was used.  The patient's values and choices were respected.  The following represents what was discussed and decided upon by the physician assistant and the patient.      1.  DIAGNOSTIC TESTS:    - Reviewed the MRI lumbar spine.  - I reviewed the x-ray right shoulder.  - I ordered an MRI cervical spine for further evaluation.  He does have prostate cancer.    2.  PHYSICAL THERAPY: No physical therapy was ordered today.  Patient will  continue with home exercises.  If patient to physical therapy for neck pain about 5 years ago and continues to do home exercises.  - Patient had physical therapy for the low back and left hip July through November 2022.  - Patient had physical therapy for left shoulder pain September through November 2020.    3.  MEDICATIONS: No changes are made to the patient's medications.  - Patient takes gabapentin 100 to 200 mg at bedtime    4.  INTERVENTIONS:    -I offer the patient a repeat left intra-articular hip joint injection under ultrasound guidance.  Patient indicated he would like to proceed and an order was placed.  - If right shoulder pain returns we could repeat the right shoulder joint injection.  - We briefly discussed radiofrequency ablation for the cervical and/or lumbar spine.  We will discuss further at his follow-up visit.    5.  PATIENT EDUCATION: Patient is in agreement the above plan.  All questions were answered.    6.  FOLLOW-UP: Patient will follow-up with me 2 weeks after his left hip joint injection.  If he has questions or concerns in the meantime, he should not hesitate to call.    Subjective:     Rasheed Hanson is a 78 year old male who presents today for follow-up regarding neck pain, back pain, right shoulder pain, left hip pain.    Patient underwent a right glenohumeral joint injection October 3, 2023.  This has provided 90% relief of pain.  He has only mild residual soreness in his right shoulder worse at night which he can manage with exercises.    Patient complains next of left hip pain.  Pain involves the left buttock, lateral hip, and extends into the left groin.  He states this feels like the same pain he had before his left intra-articular hip joint injection July 2022.  He is interested in a repeat injection.    Patient complains next of left low back pain.  Pain is okay in the left lower lumbar region.  He states it feels separate from his hip pain.    Patient complains last of  left neck pain.  This is a chronic issue but has been getting worse recently.  Pain is located in the left upper cervical region.  He denies any pain into the shoulder or down the arms.  He denies headaches associated with the pain.    Overall, patient rates his pain today as a 3 out of 10.  At its worst it is a 7 out of 10.  At its best it is a 1 out of 10.  Pain is aggravated with walking more than 1 mile and driving in the car.  Pain is alleviated with sitting in his easy chair.  Denies any new symptoms since he was last seen.  Denies any numbness or tingling down the arms or legs.    Treatment to date:  - Bilateral shoulder surgery with Dr. Weller years ago  - Physical therapy for the low back and left hip July through November 2020  - Physical therapy for left shoulder pain September through November 2020  - Right glenohumeral joint injection under ultrasound guidance October 6, 2023 with 90% relief  - Left intra-articular hip joint injection under ultrasound guidance July 22, 2022 with 80% relief of pain x8 months  - Right L4-5 transforaminal epidural steroid injection May 13, 2022 which continues to provide relief of right-sided pain  - Gabapentin 100 to 200 mg at bedtime is helpful    Review of Systems:  Positive for weakness, pain much worse at night.  Negative for numbness/tingling, loss of bowel/bladder control, inability to urinate, headache, trip/double/falls, difficulty swallowing, difficulty with hand skills, fevers, unintentional weight loss.     Objective:   CONSTITUTIONAL:  Vital signs as above.  No acute distress.  The patient is well nourished and well groomed.    PSYCHIATRIC:  The patient is awake, alert, oriented to person, place and time.  The patient is answering questions appropriately with clear speech.  Normal affect.  HEENT: Normocephalic, atraumatic.  Sclera clear.    SKIN: Exposed skin is clean, dry, intact without rashes.  MUSCULOSKELETAL: The patient has 5/5 strength for the bilateral  shoulder abductors, elbow flexors/extensors, wrist extensors, finger flexors/abductors, hip flexors, knee flexors/extensors, ankle dorsi/plantar flexors.  Tender to palpation left upper cervical facets.  Tender to palpation left lower lumbar paraspinous muscles L4-5 and L5-S1.  Tender to palpation left greater trochanter.  Cervical range of motion is restricted in all directions.  He has a positive Kemps maneuver left.  Patient has reproduction of left-sided low back pain with lumbar facet loading maneuvers.  He has mild restriction with internal and external rotation of the left hip with reproduction of pain at end range of motion.  NEUROLOGICAL: Sensation light touch intact bilateral upper and lower extremities throughout.  Negative Julia sign bilaterally.  No ankle clonus.    RESULTS:    I reviewed the MRI lumbar spine dated April 29, 2022.  At L3-4 there is moderate spinal canal stenosis and moderate bilateral foraminal stenosis.  At L4-5 there is severe facet hypertrophy with grade 1 spondylolisthesis.  There is severe right and mild left foraminal stenosis at this level.     I reviewed the x-ray right shoulder from [facility] dated August 4, 2023.  This shows postoperative changes of a prior rotator cuff repair.  There is mild degenerative changes of the acromioclavicular and glenohumeral joints.

## 2023-11-30 ENCOUNTER — OFFICE VISIT (OUTPATIENT)
Dept: PHYSICAL MEDICINE AND REHAB | Facility: CLINIC | Age: 78
End: 2023-11-30
Payer: COMMERCIAL

## 2023-11-30 VITALS
SYSTOLIC BLOOD PRESSURE: 100 MMHG | DIASTOLIC BLOOD PRESSURE: 57 MMHG | BODY MASS INDEX: 25.76 KG/M2 | HEART RATE: 59 BPM | HEIGHT: 66 IN | WEIGHT: 160.3 LBS

## 2023-11-30 DIAGNOSIS — M19.011 OSTEOARTHRITIS OF GLENOHUMERAL JOINT, RIGHT: ICD-10-CM

## 2023-11-30 DIAGNOSIS — M16.12 PRIMARY OSTEOARTHRITIS OF LEFT HIP: ICD-10-CM

## 2023-11-30 DIAGNOSIS — M54.2 CERVICALGIA: Primary | ICD-10-CM

## 2023-11-30 DIAGNOSIS — M47.816 LUMBAR FACET ARTHROPATHY: ICD-10-CM

## 2023-11-30 PROCEDURE — 99214 OFFICE O/P EST MOD 30 MIN: CPT | Performed by: PHYSICIAN ASSISTANT

## 2023-11-30 ASSESSMENT — PAIN SCALES - GENERAL: PAINLEVEL: MILD PAIN (3)

## 2023-11-30 NOTE — LETTER
11/30/2023         RE: Rasheed Hanson  [address] [place] [place] [facility] [address]        Dear Colleague,    Thank you for referring your patient, Rasheed Hanson, to the [facility]. Please see a copy of my visit note below.    Assessment:   Rasheed Hanson is a 78 y.o. male with past medical history significant for prostate cancer, gout, hyperlipidemia who presents today for follow-up regarding 4 areas of pain:  1.  Chronic right shoulder pain.  Patient has a history of rotator cuff tear 10 years ago status post surgical repair.  3 years ago he began to experience more shoulder pain again.  Patient is status post a right glenohumeral joint injection under ultrasound guidance October 6, 2023 which has provided 90% relief of pain.  2.  Chronic left hip pain due to osteoarthritis.  Patient is status post a left intra-articular hip joint injection under ultrasound guidance July 22, 2022 with Dr. Marks which provided 80% relief of his pain for about 8 months.  Pain is gradually worsening.  3.  Chronic left low back pain. My review of an MRI lumbar spine from April 2022 shows multilevel spondylosis.  At L3-4 there is moderate spinal canal stenosis and moderate bilateral foraminal stenosis.  At L4-5 there is severe facet hypertrophy with grade 1 spondylolisthesis.  There is severe right and mild left foraminal stenosis at this level.  Suspect he is symptomatic from the lower lumbar facet arthropathy.  He had reproduction of pain with lumbar facet loading maneuvers on the left.  3.  Chronic left axial neck pain.  Patient has not had any imaging of the cervical spine.  Suspect osteoarthritis affecting the upper cervical facets.  - Patient is neurologically intact.       Plan:     A shared decision making plan was used.  The patient's values and choices were respected.  The following represents what was discussed and decided upon by the physician assistant and the patient.      1.   DIAGNOSTIC TESTS:    - Reviewed the MRI lumbar spine.  - I reviewed the x-ray right shoulder.  - I ordered an MRI cervical spine for further evaluation.  He does have prostate cancer.    2.  PHYSICAL THERAPY: No physical therapy was ordered today.  Patient will continue with home exercises.  If patient to physical therapy for neck pain about 5 years ago and continues to do home exercises.  - Patient had physical therapy for the low back and left hip July through November 2022.  - Patient had physical therapy for left shoulder pain September through November 2020.    3.  MEDICATIONS: No changes are made to the patient's medications.  - Patient takes gabapentin 100 to 200 mg at bedtime    4.  INTERVENTIONS:    -I offer the patient a repeat left intra-articular hip joint injection under ultrasound guidance.  Patient indicated he would like to proceed and an order was placed.  - If right shoulder pain returns we could repeat the right shoulder joint injection.  - We briefly discussed radiofrequency ablation for the cervical and/or lumbar spine.  We will discuss further at his follow-up visit.    5.  PATIENT EDUCATION: Patient is in agreement the above plan.  All questions were answered.    6.  FOLLOW-UP: Patient will follow-up with me 2 weeks after his left hip joint injection.  If he has questions or concerns in the meantime, he should not hesitate to call.    Subjective:     Rasheed Hanson is a 78 year old male who presents today for follow-up regarding neck pain, back pain, right shoulder pain, left hip pain.    Patient underwent a right glenohumeral joint injection October 3, 2023.  This has provided 90% relief of pain.  He has only mild residual soreness in his right shoulder worse at night which he can manage with exercises.    Patient complains next of left hip pain.  Pain involves the left buttock, lateral hip, and extends into the left groin.  He states this feels like the same pain he had before his left  intra-articular hip joint injection July 2022.  He is interested in a repeat injection.    Patient complains next of left low back pain.  Pain is okay in the left lower lumbar region.  He states it feels separate from his hip pain.    Patient complains last of left neck pain.  This is a chronic issue but has been getting worse recently.  Pain is located in the left upper cervical region.  He denies any pain into the shoulder or down the arms.  He denies headaches associated with the pain.    Overall, patient rates his pain today as a 3 out of 10.  At its worst it is a 7 out of 10.  At its best it is a 1 out of 10.  Pain is aggravated with walking more than 1 mile and driving in the car.  Pain is alleviated with sitting in his easy chair.  Denies any new symptoms since he was last seen.  Denies any numbness or tingling down the arms or legs.    Treatment to date:  - Bilateral shoulder surgery with Dr. Weller years ago  - Physical therapy for the low back and left hip July through November 2020  - Physical therapy for left shoulder pain September through November 2020  - Right glenohumeral joint injection under ultrasound guidance October 6, 2023 with 90% relief  - Left intra-articular hip joint injection under ultrasound guidance July 22, 2022 with 80% relief of pain x8 months  - Right L4-5 transforaminal epidural steroid injection May 13, 2022 which continues to provide relief of right-sided pain  - Gabapentin 100 to 200 mg at bedtime is helpful    Review of Systems:  Positive for weakness, pain much worse at night.  Negative for numbness/tingling, loss of bowel/bladder control, inability to urinate, headache, trip/double/falls, difficulty swallowing, difficulty with hand skills, fevers, unintentional weight loss.     Objective:   CONSTITUTIONAL:  Vital signs as above.  No acute distress.  The patient is well nourished and well groomed.    PSYCHIATRIC:  The patient is awake, alert, oriented to person, place and time.   The patient is answering questions appropriately with clear speech.  Normal affect.  HEENT: Normocephalic, atraumatic.  Sclera clear.    SKIN: Exposed skin is clean, dry, intact without rashes.  MUSCULOSKELETAL: The patient has 5/5 strength for the bilateral shoulder abductors, elbow flexors/extensors, wrist extensors, finger flexors/abductors, hip flexors, knee flexors/extensors, ankle dorsi/plantar flexors.  Tender to palpation left upper cervical facets.  Tender to palpation left lower lumbar paraspinous muscles L4-5 and L5-S1.  Tender to palpation left greater trochanter.  Cervical range of motion is restricted in all directions.  He has a positive Kemps maneuver left.  Patient has reproduction of left-sided low back pain with lumbar facet loading maneuvers.  He has mild restriction with internal and external rotation of the left hip with reproduction of pain at end range of motion.  NEUROLOGICAL: Sensation light touch intact bilateral upper and lower extremities throughout.  Negative Julia sign bilaterally.  No ankle clonus.    RESULTS:    I reviewed the MRI lumbar spine dated April 29, 2022.  At L3-4 there is moderate spinal canal stenosis and moderate bilateral foraminal stenosis.  At L4-5 there is severe facet hypertrophy with grade 1 spondylolisthesis.  There is severe right and mild left foraminal stenosis at this level.     I reviewed the x-ray right shoulder from [facility] dated August 4, 2023.  This shows postoperative changes of a prior rotator cuff repair.  There is mild degenerative changes of the acromioclavicular and glenohumeral joints.      Again, thank you for allowing me to participate in the care of your patient.        Sincerely,        Connie Juárez PA-C

## 2023-11-30 NOTE — PATIENT INSTRUCTIONS
I ordered an MRI of your cervical spine for further evaluation of your neck pain:  [facility] Scheduling    Please call [phone] to schedule your image(s) (select option#1).         A left hip joint injection has been ordered today.      Please note that this injection uses cortisone.  The cortisone may somewhat weaken the immune system.  It is unknown how much the immune system is weakened.  It is unknown if it is weakened to the point that you may be more likely to get the COVID-19 virus, or if you do get the COVID-19 virus, if you would be sicker than you would have been if you had not had the cortisone injection.  If you do not wish to proceed with the injection, please let the nurse/physician know and do NOT schedule the injection.    Please note that since your immune system is weakened from the cortisone, having any vaccine/shot may be less effective if you have this vaccine within 2 weeks from your cortisone injection.  It is advised to wait 2 weeks after your cortisone injection to have any vaccine (or if you have a vaccine first, wait 2 weeks before you have the cortisone injection).    Please schedule this injection at least 1 week  from now to allow time for insurance prior authorization.  On the day of your injection, you cannot be sick or taking antibiotics.  If you become sick and are prescribed, please call the clinic so your injection can be rescheduled for once you have completed your antibiotics.    If you have any questions or concerns prior to your injection, please do not hesitate to call the nurse navigation line at [phone] or contact Connie Juárez through [employer].

## 2023-12-13 ENCOUNTER — MYC MEDICAL ADVICE (OUTPATIENT)
Dept: FAMILY MEDICINE | Facility: CLINIC | Age: 78
End: 2023-12-13
Payer: COMMERCIAL

## 2023-12-13 DIAGNOSIS — E03.8 OTHER SPECIFIED HYPOTHYROIDISM: Primary | ICD-10-CM

## 2023-12-18 NOTE — TELEPHONE ENCOUNTER
See COLTEN from patient. Pt self adjusted their synthroid med frequency    Beulah Mccloud RN  [facility]

## 2023-12-19 RX ORDER — LEVOTHYROXINE SODIUM 25 UG/1
25 TABLET ORAL DAILY
Qty: 90 TABLET | Refills: 3 | Status: SHIPPED | OUTPATIENT
Start: 2023-12-19 | End: 2024-03-07

## 2023-12-28 ENCOUNTER — RADIOLOGY INJECTION OFFICE VISIT (OUTPATIENT)
Dept: PHYSICAL MEDICINE AND REHAB | Facility: CLINIC | Age: 78
End: 2023-12-28
Attending: PHYSICIAN ASSISTANT
Payer: COMMERCIAL

## 2023-12-28 VITALS
TEMPERATURE: 97.8 F | HEART RATE: 58 BPM | DIASTOLIC BLOOD PRESSURE: 67 MMHG | SYSTOLIC BLOOD PRESSURE: 117 MMHG | OXYGEN SATURATION: 100 %

## 2023-12-28 DIAGNOSIS — M16.12 PRIMARY OSTEOARTHRITIS OF LEFT HIP: ICD-10-CM

## 2023-12-28 PROCEDURE — 20611 DRAIN/INJ JOINT/BURSA W/US: CPT | Mod: LT | Performed by: PHYSICAL MEDICINE & REHABILITATION

## 2023-12-28 RX ORDER — LIDOCAINE HYDROCHLORIDE 10 MG/ML
INJECTION, SOLUTION EPIDURAL; INFILTRATION; INTRACAUDAL; PERINEURAL
Status: COMPLETED | OUTPATIENT
Start: 2023-12-28 | End: 2023-12-28

## 2023-12-28 RX ORDER — TRIAMCINOLONE ACETONIDE 40 MG/ML
INJECTION, SUSPENSION INTRA-ARTICULAR; INTRAMUSCULAR
Status: COMPLETED | OUTPATIENT
Start: 2023-12-28 | End: 2023-12-28

## 2023-12-28 RX ADMIN — TRIAMCINOLONE ACETONIDE 40 MG: 40 INJECTION, SUSPENSION INTRA-ARTICULAR; INTRAMUSCULAR at 11:47

## 2023-12-28 RX ADMIN — LIDOCAINE HYDROCHLORIDE 5 ML: 10 INJECTION, SOLUTION EPIDURAL; INFILTRATION; INTRACAUDAL; PERINEURAL at 11:46

## 2023-12-28 ASSESSMENT — PAIN SCALES - GENERAL
PAINLEVEL: MILD PAIN (2)
PAINLEVEL: NO PAIN (0)

## 2023-12-28 NOTE — PATIENT INSTRUCTIONS
DISCHARGE INSTRUCTIONS    During office hours (8:00 a.m.- 4:00 p.m.) questions or concerns may be answered  by calling [facility] Navigation Nurses at  [phone].  Messages received after hours will be returned the following business day.      In the case of an emergency, please dial 911 or seek assistance at the nearest Emergency Room/Urgent Care facility.     All Patients:  You may experience an increase in your symptoms for the first 2 days (It may take anywhere between 2 days- 2 weeks for the steroid to have maximum effect).    You may use ice on the injection site, as frequently as 20 minutes each hour if needed.    You may take your pain medicine.    You may continue taking your regular medication.    You may shower. No swimming, tub bath or hot tub for 48 hours.  You may remove your bandaid/bandage as soon as you are home.    You may resume light activities, as tolerated.    Resume your usual diet as tolerated.    It is strongly advised that you do not drive for 1-3 hours post injection.    If you have had oral sedation:  Do not drive for 8 hours post injection.      If you have had IV sedation:  Do not drive for 24 hours post injection.  Do not operate hazardous machinery or make important personal/business decisions for 24 hours.    POSSIBLE STEROID SIDE EFFECTS (If steroid/cortisone was used for your procedure)    -If you experience these symptoms, it should only last for a short period    Swelling of the legs              Skin redness (flushing)     Mouth (oral) irritation   Blood sugar (glucose) levels            Sweats                   Mood changes  Headache  Weakened immune system for up to 14 days, which could increase the risk of yasmine the COVID-19 virus and/or experiencing more severe symptoms of the disease, if exposed.         POSSIBLE PROCEDURE SIDE EFFECTS    -Call the Spine Center if you are concerned    Increased Pain           Increased numbness/tingling      Nausea/Vomiting           Bruising/bleeding at site      Redness or swelling                                              Difficulty walking      Weakness          Fever greater than 100.5    *In the event of a severe headache after an epidural steroid injection that is relieved by lying down, please call the [facility] [place] to speak with a clinical staff member*

## 2024-01-02 ENCOUNTER — MYC MEDICAL ADVICE (OUTPATIENT)
Dept: FAMILY MEDICINE | Facility: CLINIC | Age: 79
End: 2024-01-02
Payer: COMMERCIAL

## 2024-02-19 ENCOUNTER — LAB (OUTPATIENT)
Dept: LAB | Facility: CLINIC | Age: 79
End: 2024-02-19
Payer: COMMERCIAL

## 2024-02-19 ENCOUNTER — MYC MEDICAL ADVICE (OUTPATIENT)
Dept: FAMILY MEDICINE | Facility: CLINIC | Age: 79
End: 2024-02-19

## 2024-02-19 DIAGNOSIS — Z12.5 PROSTATE CANCER SCREENING: Primary | ICD-10-CM

## 2024-02-19 DIAGNOSIS — Z12.5 PROSTATE CANCER SCREENING: ICD-10-CM

## 2024-02-19 DIAGNOSIS — E03.8 OTHER SPECIFIED HYPOTHYROIDISM: ICD-10-CM

## 2024-02-19 DIAGNOSIS — N18.31 STAGE 3A CHRONIC KIDNEY DISEASE (H): ICD-10-CM

## 2024-02-19 LAB
ANION GAP SERPL CALCULATED.3IONS-SCNC: 10 MMOL/L (ref 7–15)
BUN SERPL-MCNC: 21 MG/DL (ref 8–23)
CALCIUM SERPL-MCNC: 9.6 MG/DL (ref 8.8–10.2)
CHLORIDE SERPL-SCNC: 104 MMOL/L (ref 98–107)
CREAT SERPL-MCNC: 1.14 MG/DL (ref 0.67–1.17)
CREAT UR-MCNC: 21.9 MG/DL
DEPRECATED HCO3 PLAS-SCNC: 25 MMOL/L (ref 22–29)
EGFRCR SERPLBLD CKD-EPI 2021: 65 ML/MIN/1.73M2
GLUCOSE SERPL-MCNC: 87 MG/DL (ref 70–99)
MICROALBUMIN UR-MCNC: <12 MG/L
MICROALBUMIN/CREAT UR: NORMAL MG/G{CREAT}
POTASSIUM SERPL-SCNC: 4.3 MMOL/L (ref 3.4–5.3)
PSA SERPL DL<=0.01 NG/ML-MCNC: 6.29 NG/ML (ref 0–6.5)
SODIUM SERPL-SCNC: 139 MMOL/L (ref 135–145)
T4 FREE SERPL-MCNC: 1.16 NG/DL (ref 0.9–1.7)
TSH SERPL DL<=0.005 MIU/L-ACNC: 8.8 UIU/ML (ref 0.3–4.2)

## 2024-02-19 PROCEDURE — 84443 ASSAY THYROID STIM HORMONE: CPT

## 2024-02-19 PROCEDURE — 80048 BASIC METABOLIC PNL TOTAL CA: CPT

## 2024-02-19 PROCEDURE — 82043 UR ALBUMIN QUANTITATIVE: CPT

## 2024-02-19 PROCEDURE — 84439 ASSAY OF FREE THYROXINE: CPT

## 2024-02-19 PROCEDURE — 82570 ASSAY OF URINE CREATININE: CPT

## 2024-02-19 PROCEDURE — G0103 PSA SCREENING: HCPCS

## 2024-02-19 PROCEDURE — 36415 COLL VENOUS BLD VENIPUNCTURE: CPT

## 2024-02-21 ENCOUNTER — MYC MEDICAL ADVICE (OUTPATIENT)
Dept: FAMILY MEDICINE | Facility: CLINIC | Age: 79
End: 2024-02-21
Payer: COMMERCIAL

## 2024-02-22 NOTE — TELEPHONE ENCOUNTER
See CHRISI from patient.    JESSICA Castelan  [facility]

## 2024-03-05 ENCOUNTER — OFFICE VISIT (OUTPATIENT)
Dept: FAMILY MEDICINE | Facility: CLINIC | Age: 79
End: 2024-03-05
Payer: COMMERCIAL

## 2024-03-05 ENCOUNTER — ANCILLARY PROCEDURE (OUTPATIENT)
Dept: GENERAL RADIOLOGY | Facility: CLINIC | Age: 79
End: 2024-03-05
Attending: NURSE PRACTITIONER
Payer: COMMERCIAL

## 2024-03-05 VITALS
HEART RATE: 61 BPM | WEIGHT: 164.5 LBS | HEIGHT: 66 IN | DIASTOLIC BLOOD PRESSURE: 68 MMHG | TEMPERATURE: 98.8 F | OXYGEN SATURATION: 95 % | SYSTOLIC BLOOD PRESSURE: 108 MMHG | RESPIRATION RATE: 12 BRPM | BODY MASS INDEX: 26.44 KG/M2

## 2024-03-05 DIAGNOSIS — R06.2 WHEEZING: ICD-10-CM

## 2024-03-05 DIAGNOSIS — J18.9 COMMUNITY ACQUIRED PNEUMONIA OF LEFT LUNG, UNSPECIFIED PART OF LUNG: Primary | ICD-10-CM

## 2024-03-05 PROCEDURE — 71046 X-RAY EXAM CHEST 2 VIEWS: CPT | Mod: TC | Performed by: RADIOLOGY

## 2024-03-05 PROCEDURE — 99214 OFFICE O/P EST MOD 30 MIN: CPT | Mod: 25 | Performed by: NURSE PRACTITIONER

## 2024-03-05 PROCEDURE — 94640 AIRWAY INHALATION TREATMENT: CPT | Performed by: NURSE PRACTITIONER

## 2024-03-05 RX ORDER — DOXYCYCLINE 100 MG/1
100 CAPSULE ORAL 2 TIMES DAILY
Qty: 14 CAPSULE | Refills: 0 | Status: SHIPPED | OUTPATIENT
Start: 2024-03-05 | End: 2024-03-12

## 2024-03-05 RX ORDER — ALBUTEROL SULFATE 0.83 MG/ML
2.5 SOLUTION RESPIRATORY (INHALATION) ONCE
Status: COMPLETED | OUTPATIENT
Start: 2024-03-05 | End: 2024-03-05

## 2024-03-05 RX ADMIN — ALBUTEROL SULFATE 2.5 MG: 0.83 SOLUTION RESPIRATORY (INHALATION) at 15:16

## 2024-03-05 NOTE — PROGRESS NOTES
"  Assessment & Plan     Community acquired pneumonia of left lung, unspecified part of lung  Radiology read of x-ray area consistent with inflammation versus infection.  Based on patient's symptoms I will treat for pneumonia today.  If not improving I would recommend he be seen again in clinic.    - doxycycline hyclate (VIBRAMYCIN) 100 MG capsule; Take 1 capsule (100 mg) by mouth 2 times daily for 7 days    Wheezing  Albuterol neb given in clinic today.  This did seem to help with symptoms.  Wheezing improved after nebulizer.  - XR Chest 2 Views; Future  - albuterol (PROVENTIL) neb solution 2.5 mg                  Subjective   Herb is a 78 year old, presenting for the following health issues:  Asthma      3/5/2024     2:24 PM   Additional Questions   Roomed by JOSESITO CHAMPAGNE     History of Present Illness       Reason for visit:  Congestion in lungs    He eats 4 or more servings of fruits and vegetables daily.He consumes 0 sweetened beverage(s) daily.He exercises with enough effort to increase his heart rate 10 to 19 minutes per day.  He exercises with enough effort to increase his heart rate 3 or less days per week. He is missing 1 dose(s) of medications per week.   Symptoms starting about 2 days ago.   Feels like is still wheezing. Worrying that lungs are filling with fluid.   Had negative covid test at home today.  NO  diagnosis of asthma.   Cold air makes symptoms worse and gets wheezing.   NO high fevers.                   Objective    /68 (BP Location: Right arm, Patient Position: Sitting, Cuff Size: Adult Regular)   Pulse 61   Temp 98.8  F (37.1  C) (Oral)   Resp 12   Ht 1.676 m (5' 6\")   Wt 74.6 kg (164 lb 8 oz)   SpO2 95%   BMI 26.55 kg/m    Body mass index is 26.55 kg/m .  Physical Exam  Vitals reviewed.   HENT:      Right Ear: Hearing, tympanic membrane, ear canal and external ear normal.      Left Ear: Hearing, tympanic membrane, ear canal and external ear normal.      Nose: Nose normal.      " Mouth/Throat:      Mouth: Mucous membranes are moist.      Pharynx: Oropharynx is clear. No posterior oropharyngeal erythema.      Tonsils: No tonsillar exudate.   Cardiovascular:      Rate and Rhythm: Normal rate and regular rhythm.   Pulmonary:      Effort: Pulmonary effort is normal.      Breath sounds: Wheezing present.   Neurological:      General: No focal deficit present.      Mental Status: He is alert and oriented to person, place, and time.          XR Chest 2 Views    Result Date: 3/5/2024  EXAM: XR CHEST 2 VIEWS LOCATION: [facility] DATE: 3/5/2024 INDICATION:  Wheezing COMPARISON: Chest CT 05/07/2018     IMPRESSION: Subtle left infrahilar opacities may be infectious/inflammatory. Remaining lungs are clear. No pleural effusion or pneumothorax. Normal heart size.          Signed Electronically by: FERDINAND SOLIS CNP

## 2024-03-06 ENCOUNTER — MYC MEDICAL ADVICE (OUTPATIENT)
Dept: FAMILY MEDICINE | Facility: CLINIC | Age: 79
End: 2024-03-06
Payer: COMMERCIAL

## 2024-03-06 DIAGNOSIS — E03.8 OTHER SPECIFIED HYPOTHYROIDISM: Primary | ICD-10-CM

## 2024-03-07 RX ORDER — THYROID 15 MG/1
15 TABLET ORAL DAILY
Qty: 90 TABLET | Refills: 3 | Status: SHIPPED | OUTPATIENT
Start: 2024-03-07

## 2024-03-07 NOTE — TELEPHONE ENCOUNTER
See MyChart from patient needing PCP review.    Pt interested in taking alternate thyroid med. Can pt submit an eVisit for this or OV/VV?    Beulah Mccloud RN  [facility]

## 2024-03-12 DIAGNOSIS — J40 BRONCHITIS: Primary | ICD-10-CM

## 2024-03-12 RX ORDER — ALBUTEROL SULFATE 0.83 MG/ML
2.5 SOLUTION RESPIRATORY (INHALATION) EVERY 6 HOURS PRN
Qty: 90 ML | Refills: 0 | Status: SHIPPED | OUTPATIENT
Start: 2024-03-12

## 2024-03-29 ENCOUNTER — MYC MEDICAL ADVICE (OUTPATIENT)
Dept: FAMILY MEDICINE | Facility: CLINIC | Age: 79
End: 2024-03-29
Payer: COMMERCIAL

## 2024-03-29 NOTE — TELEPHONE ENCOUNTER
Please see My Chart message:  Patient has started his Thyroid medication and would like to know when you would like follow up and labs redrawn.

## 2024-05-18 ENCOUNTER — OFFICE VISIT (OUTPATIENT)
Dept: FAMILY MEDICINE | Facility: CLINIC | Age: 79
End: 2024-05-18
Payer: COMMERCIAL

## 2024-05-18 VITALS
BODY MASS INDEX: 26.52 KG/M2 | WEIGHT: 165 LBS | RESPIRATION RATE: 14 BRPM | DIASTOLIC BLOOD PRESSURE: 65 MMHG | SYSTOLIC BLOOD PRESSURE: 110 MMHG | HEART RATE: 57 BPM | OXYGEN SATURATION: 99 % | TEMPERATURE: 97.9 F | HEIGHT: 66 IN

## 2024-05-18 DIAGNOSIS — A69.20 ERYTHEMA MIGRANS (LYME DISEASE): Primary | ICD-10-CM

## 2024-05-18 PROCEDURE — 99213 OFFICE O/P EST LOW 20 MIN: CPT | Performed by: STUDENT IN AN ORGANIZED HEALTH CARE EDUCATION/TRAINING PROGRAM

## 2024-05-18 RX ORDER — DOXYCYCLINE 100 MG/1
100 CAPSULE ORAL 2 TIMES DAILY
Qty: 20 CAPSULE | Refills: 0 | Status: SHIPPED | OUTPATIENT
Start: 2024-05-18 | End: 2024-05-28

## 2024-05-18 NOTE — PROGRESS NOTES
"Assessment & Plan     Erythema migrans (Lyme disease)  Pulled tick off about a week ago, unsure how long it was attached. Now has red ring around the bite that is growing. Advised treatment with doxycycline for 10 days and follow up  if any changes in symptoms.   - doxycycline hyclate (VIBRAMYCIN) 100 MG capsule  Dispense: 20 capsule; Refill: 0         No follow-ups on file.    FERDINAND Palacios CNP  [facility]     Herb is a 79 year old male who presents to clinic today for the following health issues:  Chief Complaint   Patient presents with    Insect Bites     Has a tick bite with a red ring on right upper in arm size of  quarter- seems to be getting bigger and had a black dot in the center     HPI      Review of Systems  Constitutional, HEENT, cardiovascular, pulmonary, gi and gu systems are negative, except as otherwise noted.      Objective    /65   Pulse 57   Temp 97.9  F (36.6  C) (Oral)   Resp 14   Ht 1.676 m (5' 6\")   Wt 74.8 kg (165 lb)   SpO2 99%   BMI 26.63 kg/m    Physical Exam   GENERAL: alert and no distress  MS: no gross musculoskeletal defects noted, no edema  SKIN: punctate lesion on right upper inner arm with red [place] around the lesion  NEURO: Normal strength and tone, mentation intact and speech normal  PSYCH: mentation appears normal, affect normal/bright          "

## 2024-05-28 ENCOUNTER — TELEPHONE (OUTPATIENT)
Dept: PHYSICAL MEDICINE AND REHAB | Facility: CLINIC | Age: 79
End: 2024-05-28
Payer: COMMERCIAL

## 2024-05-28 DIAGNOSIS — M16.12 PRIMARY OSTEOARTHRITIS OF LEFT HIP: Primary | ICD-10-CM

## 2024-05-28 NOTE — TELEPHONE ENCOUNTER
[phone]    Reason for call:  Other   Patient called regarding (reason for call): Injection  Additional comments: Pt requesting a repeat Left intra-articular hip joint injection under US guidance, last done with Rohini 2023    Phone number to reach patient:  Home number on file [phone] (home)    Can we leave a detailed message on this number?  YES    Travel screening: Not Applicable            Howard Braulio  Complex   [facility]  Pain Management

## 2024-05-29 NOTE — TELEPHONE ENCOUNTER
Pt returned call. Pt reports he got 90% relief with last US guided left intra-articular hip joint injection. His same, left hip pain returned days ago and he would like to proceed with repeat injection.   Order placed. Transferred pt to scheduling to make appt.

## 2024-06-06 ENCOUNTER — RADIOLOGY INJECTION OFFICE VISIT (OUTPATIENT)
Dept: PHYSICAL MEDICINE AND REHAB | Facility: CLINIC | Age: 79
End: 2024-06-06
Attending: PHYSICIAN ASSISTANT
Payer: COMMERCIAL

## 2024-06-06 VITALS
HEART RATE: 76 BPM | DIASTOLIC BLOOD PRESSURE: 56 MMHG | HEIGHT: 66 IN | SYSTOLIC BLOOD PRESSURE: 121 MMHG | WEIGHT: 160.4 LBS | BODY MASS INDEX: 25.78 KG/M2 | OXYGEN SATURATION: 97 %

## 2024-06-06 DIAGNOSIS — M16.12 PRIMARY OSTEOARTHRITIS OF LEFT HIP: ICD-10-CM

## 2024-06-06 PROCEDURE — 20611 DRAIN/INJ JOINT/BURSA W/US: CPT | Mod: LT | Performed by: PHYSICAL MEDICINE & REHABILITATION

## 2024-06-06 RX ORDER — ROPIVACAINE HYDROCHLORIDE 5 MG/ML
INJECTION, SOLUTION EPIDURAL; INFILTRATION; PERINEURAL
Status: COMPLETED | OUTPATIENT
Start: 2024-06-06 | End: 2024-06-06

## 2024-06-06 RX ORDER — TRIAMCINOLONE ACETONIDE 40 MG/ML
INJECTION, SUSPENSION INTRA-ARTICULAR; INTRAMUSCULAR
Status: COMPLETED | OUTPATIENT
Start: 2024-06-06 | End: 2024-06-06

## 2024-06-06 RX ORDER — LIDOCAINE HYDROCHLORIDE 10 MG/ML
INJECTION, SOLUTION EPIDURAL; INFILTRATION; INTRACAUDAL; PERINEURAL
Status: COMPLETED | OUTPATIENT
Start: 2024-06-06 | End: 2024-06-06

## 2024-06-06 RX ADMIN — TRIAMCINOLONE ACETONIDE 40 MG: 40 INJECTION, SUSPENSION INTRA-ARTICULAR; INTRAMUSCULAR at 14:18

## 2024-06-06 RX ADMIN — LIDOCAINE HYDROCHLORIDE 2 ML: 10 INJECTION, SOLUTION EPIDURAL; INFILTRATION; INTRACAUDAL; PERINEURAL at 14:17

## 2024-06-06 RX ADMIN — ROPIVACAINE HYDROCHLORIDE 4 ML: 5 INJECTION, SOLUTION EPIDURAL; INFILTRATION; PERINEURAL at 14:18

## 2024-06-06 ASSESSMENT — PAIN SCALES - GENERAL
PAINLEVEL: NO PAIN (0)
PAINLEVEL: NO PAIN (1)

## 2024-06-06 NOTE — PATIENT INSTRUCTIONS
Follow-up visit with JUAN FRANCISCO Stevens in 2 weeks if symptoms worsen or fail to improve.  Otherwise, please follow-up on an as-needed basis going forward.       DISCHARGE INSTRUCTIONS    During office hours (8:00 a.m.- 4:00 p.m.) questions or concerns may be answered  by calling [facility] Navigation Nurses at  [phone].  Messages received after hours will be returned the following business day.      In the case of an emergency, please dial 911 or seek assistance at the nearest Emergency Room/Urgent Care facility.     All Patients:    You may experience an increase in your symptoms for the first 2 days (It may take anywhere between 2 days- 2 weeks for the steroid to have maximum effect).    You may use ice on the injection site, as frequently as 20 minutes each hour if needed.    You may take your pain medicine.    You may continue taking your regular medication after your injection. If you have had a Medial Branch Block you may resume pain medication once your pain diary is completed.    You may shower. No swimming, tub bath or hot tub for 48 hours.  You may remove your bandaid/bandage as soon as you are home.    You may resume light activities, as tolerated.    Resume your usual diet as tolerated.    It is strongly advised that you do not drive for 1-3 hours post injection.    If you have had oral sedation:  Do not drive for 8 hours post injection.      If you have had IV sedation:  Do not drive for 24 hours post injection.  Do not operate hazardous machinery or make important personal/business decisions for 24 hours.      POSSIBLE STEROID SIDE EFFECTS (If steroid/cortisone was used for your procedure)    -If you experience these symptoms, it should only last for a short period    Swelling of the legs              Skin redness (flushing)     Mouth (oral) irritation   Blood sugar (glucose) levels            Sweats                    Mood changes  Headache  Sleeplessness  Weakened immune system for up to 14 days,  which could increase the risk of yasmine the COVID-19 virus and/or experiencing more severe symptoms of the disease, if exposed.  Decreased effectiveness of the flu vaccine if given within 2 weeks of the steroid.         POSSIBLE PROCEDURE SIDE EFFECTS  -Call the Spine Center if you are concerned  Increased Pain           Increased numbness/tingling      Nausea/Vomiting          Bruising/bleeding at site      Redness or swelling                                              Difficulty walking      Weakness           Fever greater than 100.5    *In the event of a severe headache after an epidural steroid injection that is relieved by lying down, please call the [facility] to speak with a clinical staff member*

## 2024-06-06 NOTE — PROGRESS NOTES
Assessment/Plan:      Wilber was seen today for injections.    Diagnoses and all orders for this visit:    Primary osteoarthritis of left hip  -     PAIN US Large Joint Injection Unilateral  -     MA ARTHROCENTESIS ASPIR&/INJ MAJOR JT/BURSA W/US    Other orders  -     lidocaine (PF) (XYLOCAINE) 1 % injection  -     triamcinolone (KENALOG-40) injection  -     ROPivacaine (NAROPIN) 0.5% injection         Assessment: Pleasant 79 year old male  with past medical history significant for prostate cancer, gout, hyperlipidemia with:    1.  Left hip pain secondary to osteoarthritis.  Prior injection July 2022 received 80% relief for approximately 8 months for the left hip and and also had injection right hip December 2023.      Discussion:    1.  Patient presents for injection of the left hip today.  This with ultrasound guidance.  He agrees to proceed.  Please see attached procedure note.    2.  Follow-up with Connie Juárez in 2 to 4 weeks.      It was our pleasure caring for your patient today, if there any questions or concerns please do not hesitate to contact us.      Subjective:   Patient ID: Rasheed Hanson is a 79 year old male.    History of Present Illness: Patient presents at the request of Connie Juárez for left hip injection under ultrasound guidance.  Patient has left hip and groin pain with a C sign over the anterior lateral hip with prolonged walking.  Has had injection in the left hip which very effective in the past.  Here for repeat injection.           Review of Systems:    Patient denies fevers, chills, sweats, recent illnesses or antibiotics.           Past Medical History:   Diagnosis Date    Acute gouty arthropathy     Created by Conversion     Bronchitis, not specified as acute or chronic     Created by Conversion     Dermatophytosis of foot     Created by Conversion     Enthesopathy     Created by Conversion  Replacement Utility updated for latest IMO load    Infective otitis externa     Created by  "Conversion  Replacement Utility updated for latest IMO load    Other and unspecified hyperlipidemia     Created by Conversion     Thoracic or lumbosacral neuritis or radiculitis, unspecified     Created by Conversion        The following portions of the patient's history were reviewed and updated as appropriate: allergies, current medications, past family history, past medical history, past social history, past surgical history and problem list.           Objective:   Physical Exam:    /56 (BP Location: Left arm, Patient Position: Sitting, Cuff Size: Adult Regular)   Pulse 76   Ht 5' 6\" (1.676 m)   Wt 160 lb 6.4 oz (72.8 kg)   SpO2 97%   BMI 25.89 kg/m    Body mass index is 25.89 kg/m .      General: Alert and oriented with normal affect. Attention, knowledge, memory, and language are intact. No acute distress.   Eyes: Sclerae are clear.  Respirations: Unlabored. CV: No lower extremity edema.    Gait:  Nonantalgic.  Full internal rotation of the left hip.  Tenderness over left greater trochanter.    Sensation is intact to light touch throughout the lower extremities.       Manual muscle testing reveals:  Right /Left out of 5     5/5 ankle plantar flexors  5/5 ankle dorsiflexors  5/5   ankle evertors    Procedure:    After discussing the risks and benefits of an intra-articular steroid injection to the left hip including but not limited to infection, bleeding/hematoma, avascular necrosis, and steroid flare, informed consent was obtained. A time out was done prior to the procedure. The left proximal femur was surveyed to locate the femoral head and neck. The target was identified. The site was then marked and prepped with ChloraPrep. The skin was anesthetized with 1 mL of 1% lidocaine. Under ultrasound visualization, a 22-gauge 3.5\"  needle was advanced to the joint capsule. 5 mL's of injectate containing 4 mL's of 0.5% ropivacaine and 1 mL containing 40 mg of Kenalog was injected after aspiration was " negative for heme. The patient tolerated the procedure well and there were no immediate complications. Images stored in PACS.    Preprocedure pain: 1/10   Postprocedure pain: 0/10

## 2024-06-15 ENCOUNTER — MYC MEDICAL ADVICE (OUTPATIENT)
Dept: FAMILY MEDICINE | Facility: CLINIC | Age: 79
End: 2024-06-15
Payer: COMMERCIAL

## 2024-10-28 DIAGNOSIS — R35.1 NOCTURIA ASSOCIATED WITH BENIGN PROSTATIC HYPERPLASIA: ICD-10-CM

## 2024-10-28 DIAGNOSIS — N40.1 NOCTURIA ASSOCIATED WITH BENIGN PROSTATIC HYPERPLASIA: ICD-10-CM

## 2024-10-28 RX ORDER — TAMSULOSIN HYDROCHLORIDE 0.4 MG/1
0.4 CAPSULE ORAL DAILY
Qty: 90 CAPSULE | Refills: 3 | Status: SHIPPED | OUTPATIENT
Start: 2024-10-28

## 2024-10-28 NOTE — TELEPHONE ENCOUNTER
Pending Prescriptions:                       Disp   Refills    tamsulosin (FLOMAX) 0.4 MG capsule        90 cap*3            Sig: Take 1 capsule (0.4 mg) by mouth daily.    Pharmacy:    [facility] - [place], [place] - [address] AT [facility]

## 2024-11-11 ENCOUNTER — TRANSFERRED RECORDS (OUTPATIENT)
Dept: HEALTH INFORMATION MANAGEMENT | Facility: CLINIC | Age: 79
End: 2024-11-11

## 2024-11-12 ENCOUNTER — TRANSFERRED RECORDS (OUTPATIENT)
Dept: HEALTH INFORMATION MANAGEMENT | Facility: CLINIC | Age: 79
End: 2024-11-12
Payer: COMMERCIAL

## 2024-11-19 ENCOUNTER — TRANSFERRED RECORDS (OUTPATIENT)
Dept: HEALTH INFORMATION MANAGEMENT | Facility: CLINIC | Age: 79
End: 2024-11-19

## 2024-11-22 ENCOUNTER — TRANSFERRED RECORDS (OUTPATIENT)
Dept: HEALTH INFORMATION MANAGEMENT | Facility: CLINIC | Age: 79
End: 2024-11-22
Payer: COMMERCIAL

## 2024-12-14 ENCOUNTER — HOSPITAL ENCOUNTER (EMERGENCY)
Facility: HOSPITAL | Age: 79
Discharge: HOME | End: 2024-12-15
Attending: STUDENT IN AN ORGANIZED HEALTH CARE EDUCATION/TRAINING PROGRAM | Admitting: STUDENT IN AN ORGANIZED HEALTH CARE EDUCATION/TRAINING PROGRAM
Payer: COMMERCIAL

## 2024-12-14 ENCOUNTER — APPOINTMENT (OUTPATIENT)
Dept: CT IMAGING | Facility: HOSPITAL | Age: 79
End: 2024-12-14
Payer: COMMERCIAL

## 2024-12-14 DIAGNOSIS — R07.81 RIB PAIN ON RIGHT SIDE: ICD-10-CM

## 2024-12-14 DIAGNOSIS — R07.9 CHEST PAIN, UNSPECIFIED TYPE: ICD-10-CM

## 2024-12-14 DIAGNOSIS — J98.4 PNEUMONITIS: ICD-10-CM

## 2024-12-14 LAB
ANION GAP SERPL CALCULATED.3IONS-SCNC: 10 MMOL/L (ref 7–15)
BASOPHILS # BLD AUTO: 0.1 10E3/UL (ref 0–0.2)
BASOPHILS NFR BLD AUTO: 1 %
BUN SERPL-MCNC: 25.1 MG/DL (ref 8–23)
CALCIUM SERPL-MCNC: 9 MG/DL (ref 8.8–10.4)
CHLORIDE SERPL-SCNC: 105 MMOL/L (ref 98–107)
CREAT SERPL-MCNC: 1.22 MG/DL (ref 0.67–1.17)
EGFRCR SERPLBLD CKD-EPI 2021: 60 ML/MIN/1.73M2
EOSINOPHIL # BLD AUTO: 0.3 10E3/UL (ref 0–0.7)
EOSINOPHIL NFR BLD AUTO: 4 %
ERYTHROCYTE [DISTWIDTH] IN BLOOD BY AUTOMATED COUNT: 13.2 % (ref 10–15)
GLUCOSE SERPL-MCNC: 86 MG/DL (ref 70–99)
HCO3 SERPL-SCNC: 26 MMOL/L (ref 22–29)
HCT VFR BLD AUTO: 42 % (ref 40–53)
HGB BLD-MCNC: 14.2 G/DL (ref 13.3–17.7)
HOLD SPECIMEN: NORMAL
IMM GRANULOCYTES # BLD: 0 10E3/UL
IMM GRANULOCYTES NFR BLD: 0 %
LYMPHOCYTES # BLD AUTO: 1.9 10E3/UL (ref 0.8–5.3)
LYMPHOCYTES NFR BLD AUTO: 21 %
MCH RBC QN AUTO: 32.4 PG (ref 26.5–33)
MCHC RBC AUTO-ENTMCNC: 33.8 G/DL (ref 31.5–36.5)
MCV RBC AUTO: 96 FL (ref 78–100)
MONOCYTES # BLD AUTO: 1.2 10E3/UL (ref 0–1.3)
MONOCYTES NFR BLD AUTO: 13 %
NEUTROPHILS # BLD AUTO: 5.7 10E3/UL (ref 1.6–8.3)
NEUTROPHILS NFR BLD AUTO: 61 %
NRBC # BLD AUTO: 0 10E3/UL
NRBC BLD AUTO-RTO: 0 /100
PLATELET # BLD AUTO: 234 10E3/UL (ref 150–450)
POTASSIUM SERPL-SCNC: 4.4 MMOL/L (ref 3.4–5.3)
RBC # BLD AUTO: 4.38 10E6/UL (ref 4.4–5.9)
SODIUM SERPL-SCNC: 141 MMOL/L (ref 135–145)
TROPONIN T SERPL HS-MCNC: 10 NG/L
WBC # BLD AUTO: 9.3 10E3/UL (ref 4–11)

## 2024-12-14 PROCEDURE — 71275 CT ANGIOGRAPHY CHEST: CPT

## 2024-12-14 PROCEDURE — 96374 THER/PROPH/DIAG INJ IV PUSH: CPT | Mod: 59

## 2024-12-14 PROCEDURE — 84484 ASSAY OF TROPONIN QUANT: CPT | Performed by: STUDENT IN AN ORGANIZED HEALTH CARE EDUCATION/TRAINING PROGRAM

## 2024-12-14 PROCEDURE — 85025 COMPLETE CBC W/AUTO DIFF WBC: CPT | Performed by: STUDENT IN AN ORGANIZED HEALTH CARE EDUCATION/TRAINING PROGRAM

## 2024-12-14 PROCEDURE — 84132 ASSAY OF SERUM POTASSIUM: CPT | Performed by: STUDENT IN AN ORGANIZED HEALTH CARE EDUCATION/TRAINING PROGRAM

## 2024-12-14 PROCEDURE — 93005 ELECTROCARDIOGRAM TRACING: CPT | Performed by: STUDENT IN AN ORGANIZED HEALTH CARE EDUCATION/TRAINING PROGRAM

## 2024-12-14 PROCEDURE — 250N000011 HC RX IP 250 OP 636

## 2024-12-14 PROCEDURE — 36415 COLL VENOUS BLD VENIPUNCTURE: CPT | Performed by: STUDENT IN AN ORGANIZED HEALTH CARE EDUCATION/TRAINING PROGRAM

## 2024-12-14 PROCEDURE — 99285 EMERGENCY DEPT VISIT HI MDM: CPT | Mod: 25

## 2024-12-14 PROCEDURE — 80048 BASIC METABOLIC PNL TOTAL CA: CPT | Performed by: STUDENT IN AN ORGANIZED HEALTH CARE EDUCATION/TRAINING PROGRAM

## 2024-12-14 RX ORDER — KETOROLAC TROMETHAMINE 15 MG/ML
15 INJECTION, SOLUTION INTRAMUSCULAR; INTRAVENOUS ONCE
Status: COMPLETED | OUTPATIENT
Start: 2024-12-14 | End: 2024-12-14

## 2024-12-14 RX ORDER — IOPAMIDOL 755 MG/ML
75 INJECTION, SOLUTION INTRAVASCULAR ONCE
Status: COMPLETED | OUTPATIENT
Start: 2024-12-15 | End: 2024-12-14

## 2024-12-14 RX ADMIN — KETOROLAC TROMETHAMINE 15 MG: 15 INJECTION, SOLUTION INTRAMUSCULAR; INTRAVENOUS at 22:27

## 2024-12-14 RX ADMIN — IOPAMIDOL 75 ML: 755 INJECTION, SOLUTION INTRAVENOUS at 23:59

## 2024-12-14 ASSESSMENT — COLUMBIA-SUICIDE SEVERITY RATING SCALE - C-SSRS
1. IN THE PAST MONTH, HAVE YOU WISHED YOU WERE DEAD OR WISHED YOU COULD GO TO SLEEP AND NOT WAKE UP?: NO
6. HAVE YOU EVER DONE ANYTHING, STARTED TO DO ANYTHING, OR PREPARED TO DO ANYTHING TO END YOUR LIFE?: NO
2. HAVE YOU ACTUALLY HAD ANY THOUGHTS OF KILLING YOURSELF IN THE PAST MONTH?: NO

## 2024-12-14 ASSESSMENT — ACTIVITIES OF DAILY LIVING (ADL)
ADLS_ACUITY_SCORE: 41
ADLS_ACUITY_SCORE: 41

## 2024-12-15 VITALS
HEART RATE: 66 BPM | WEIGHT: 160 LBS | RESPIRATION RATE: 39 BRPM | BODY MASS INDEX: 25.71 KG/M2 | HEIGHT: 66 IN | TEMPERATURE: 98.5 F | OXYGEN SATURATION: 96 % | SYSTOLIC BLOOD PRESSURE: 130 MMHG | DIASTOLIC BLOOD PRESSURE: 78 MMHG

## 2024-12-15 LAB — TROPONIN T SERPL HS-MCNC: 11 NG/L

## 2024-12-15 ASSESSMENT — ACTIVITIES OF DAILY LIVING (ADL): ADLS_ACUITY_SCORE: 41

## 2024-12-15 NOTE — DISCHARGE INSTRUCTIONS
"Your lab work and imaging was normal, with the exception of \"pneumonitis\" or inflammation on the left lung - likely from some inhaled sputum. This is not a pneumonia (unless you develop fever with productive cough)    You can tylenol to help manage your pain at home, 1000mg every 6 hours with no more than 4000mg in 24 hours. You can also try ice or heat to the area.     If you develop shortness of breath, chest pain on exertion, abdominal pain, or any new symptoms, please return to the Emergency Department for evaluation.   "

## 2024-12-15 NOTE — ED TRIAGE NOTES
"Patient is here with chest pain and some shortness of breath. Patient woke up with some \"crap\" that he hacked up and then reports a pain on the left side of his chest.     Pain as been present all day      Triage Assessment (Adult)       Row Name 12/14/24 9506          Triage Assessment    Airway WDL WDL        Respiratory WDL    Respiratory WDL WDL        Skin Circulation/Temperature WDL    Skin Circulation/Temperature WDL WDL        Cardiac WDL    Cardiac WDL chest pain        Peripheral/Neurovascular WDL    Peripheral Neurovascular WDL WDL        Cognitive/Neuro/Behavioral WDL    Cognitive/Neuro/Behavioral WDL WDL                     "

## 2024-12-15 NOTE — ED PROVIDER NOTES
"Emergency Department Midlevel Supervisory Note     I had a face to face encounter with this patient seen by the Advanced Practice Provider (MARY ELLEN). I personally made/approved the management plan and take responsibility for the patient management. I personally saw patient and performed a substantive portion of the visit including all aspects of the medical decision making.     ED Course:  10:13 PM  Oneida Peacock PA-C staffed patient with me. I agree with their assessment and plan of management, and I will see the patient.  10:20 PM  I met with the patient to introduce myself, gather additional history, perform my initial exam, and discuss the plan.     Brief HPI:     Rasheed Hanson is a 79 year old male who presents for evaluation of chest pain, cough, SOB    Patient reports chest pain after coughing up mucous this morning. The pain subsided but then returned around 2000 tonight. Patient is also having shortness of breath and cough.        I, Mayur Bowie, am serving as a scribe to document services personally performed by Efrain Tinajero MD based on my observation and the provider's statements to me. I, Efrain Tinajero MD, attest that Mayur Bowie is acting in a scribe capacity, has observed my performance of the services and has documented them in accordance with my direction.     Brief Physical Exam: /68   Pulse 62   Temp 98.5  F (36.9  C)   Resp 29   Ht 1.676 m (5' 6\")   Wt 72.6 kg (160 lb)   SpO2 97%   BMI 25.82 kg/m    Constitutional:  Alert, in no acute distress  EYES: Conjunctivae clear  HENT:  Atraumatic  Respiratory:  Respirations even, unlabored, in no acute respiratory distress  Cardiovascular:  Regular rate and rhythm, good peripheral perfusion  GI: Soft, non-distended, non-tender  Musculoskeletal:  Moves all 4 extremities equally, grossly symmetrical strength  Integument: Warm & dry. No appreciable rash, erythema.  Neurologic:  Alert & oriented, speech clear and fluent, no focal " deficits noted  Psych: Normal mood and affect       MDM:  I agree with the assessment and plan of JUAN FRANCISCO Mohamud    ED Course as of 12/15/24 0102   Sat Dec 14, 2024   2213 L sided chest pain after coughing up mucous this morning. Went away, but came back this evening. Now having hard time taking deep breath. Ddx includes MSK pain, ACS, PE, ptx.   2217 EKG is normal sinus rhythm at a rate of 68.  No ST segment changes indicative of emergent ischemia.   2318 No leukocytosis or anemia   2321 No significant change in kidney function from 1 year ago.  No electrolyte abnormalities.   Sun Dec 15, 2024   0010 Initial troponin is 10.  Will trend at the 2-hour.   0011 Patient is feeling better after Toradol.   0038 Repeat troponin unchanged at 11.   0038 CT scan does not show evidence of pulmonary embolism.  No rib fractures.  There is an element of pneumonitis on the left side, but no focal opacities to suggest pneumonia.  Would not treat with antibiotics at this time in the absence of fever, white count, hypoxia.   0100 Reassessed patient, and he still is feeling better than before.  Will discharge at this time with return precautions.       1. Rib pain on right side    2. Chest pain, unspecified type    3. Pneumonitis        Consults:  None    Labs and Imaging:  Results for orders placed or performed during the hospital encounter of 12/14/24   CT Chest Pulmonary Embolism w Contrast    Impression    IMPRESSION:  1.  Negative for pulmonary embolism.    2.  Mild subpleural consolidation left lower lobe with some mild patchy opacities elsewhere in both lower lungs could be seen with pneumonitis. Clinical correlation.    3.  Tiny left pleural effusion.    4.  No significant findings elsewhere.   Extra Blue Top Tube   Result Value Ref Range    Hold Specimen JIC    Extra Red Top Tube   Result Value Ref Range    Hold Specimen JIC    Extra Green Top (Lithium Heparin) Tube   Result Value Ref Range    Hold Specimen JIC    Extra Purple Top  Tube   Result Value Ref Range    Hold Specimen [facility]    Basic metabolic panel   Result Value Ref Range    Sodium 141 135 - 145 mmol/L    Potassium 4.4 3.4 - 5.3 mmol/L    Chloride 105 98 - 107 mmol/L    Carbon Dioxide (CO2) 26 22 - 29 mmol/L    Anion Gap 10 7 - 15 mmol/L    Urea Nitrogen 25.1 (H) 8.0 - 23.0 mg/dL    Creatinine 1.22 (H) 0.67 - 1.17 mg/dL    GFR Estimate 60 (L) >60 mL/min/1.73m2    Calcium 9.0 8.8 - 10.4 mg/dL    Glucose 86 70 - 99 mg/dL   Result Value Ref Range    Troponin T, High Sensitivity 10 <=22 ng/L   CBC with platelets and differential   Result Value Ref Range    WBC Count 9.3 4.0 - 11.0 10e3/uL    RBC Count 4.38 (L) 4.40 - 5.90 10e6/uL    Hemoglobin 14.2 13.3 - 17.7 g/dL    Hematocrit 42.0 40.0 - 53.0 %    MCV 96 78 - 100 fL    MCH 32.4 26.5 - 33.0 pg    MCHC 33.8 31.5 - 36.5 g/dL    RDW 13.2 10.0 - 15.0 %    Platelet Count 234 150 - 450 10e3/uL    % Neutrophils 61 %    % Lymphocytes 21 %    % Monocytes 13 %    % Eosinophils 4 %    % Basophils 1 %    % Immature Granulocytes 0 %    NRBCs per 100 WBC 0 <1 /100    Absolute Neutrophils 5.7 1.6 - 8.3 10e3/uL    Absolute Lymphocytes 1.9 0.8 - 5.3 10e3/uL    Absolute Monocytes 1.2 0.0 - 1.3 10e3/uL    Absolute Eosinophils 0.3 0.0 - 0.7 10e3/uL    Absolute Basophils 0.1 0.0 - 0.2 10e3/uL    Absolute Immature Granulocytes 0.0 <=0.4 10e3/uL    Absolute NRBCs 0.0 10e3/uL   Result Value Ref Range    Troponin T, High Sensitivity 11 <=22 ng/L       I have reviewed the relevant laboratory studies above.    I independently interpreted the following imaging study(s):   As above    EKG: I reviewed and independently interpreted the patient's EKG, with comments made as listed below. Please see scanned EKG for full report.   Interpreted above    Procedures:  I was present for the key portions of procedures documented in MARY ELLEN/midlevel note, see midlevel note for further details.    Efrain Tinajero MD   [facility] EMERGENCY  DEPARTMENT  [address] [place] [id number]  [phone]       Efrain Tinajero MD  12/15/24 0102
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
## 2024-12-26 ENCOUNTER — OFFICE VISIT (OUTPATIENT)
Dept: FAMILY MEDICINE | Facility: CLINIC | Age: 79
End: 2024-12-26
Payer: COMMERCIAL

## 2024-12-26 VITALS
HEIGHT: 66 IN | OXYGEN SATURATION: 98 % | DIASTOLIC BLOOD PRESSURE: 76 MMHG | RESPIRATION RATE: 16 BRPM | HEART RATE: 56 BPM | TEMPERATURE: 97.5 F | SYSTOLIC BLOOD PRESSURE: 118 MMHG | BODY MASS INDEX: 27.8 KG/M2 | WEIGHT: 173 LBS

## 2024-12-26 DIAGNOSIS — M54.16 LUMBAR RADICULOPATHY, CHRONIC: ICD-10-CM

## 2024-12-26 DIAGNOSIS — C61 MALIGNANT TUMOR OF PROSTATE (H): ICD-10-CM

## 2024-12-26 DIAGNOSIS — N40.1 NOCTURIA ASSOCIATED WITH BENIGN PROSTATIC HYPERPLASIA: ICD-10-CM

## 2024-12-26 DIAGNOSIS — M25.552 LEFT HIP PAIN: ICD-10-CM

## 2024-12-26 DIAGNOSIS — R35.1 NOCTURIA ASSOCIATED WITH BENIGN PROSTATIC HYPERPLASIA: ICD-10-CM

## 2024-12-26 DIAGNOSIS — M1A.9XX0 CHRONIC GOUT WITHOUT TOPHUS, UNSPECIFIED CAUSE, UNSPECIFIED SITE: ICD-10-CM

## 2024-12-26 DIAGNOSIS — E78.2 MIXED HYPERLIPIDEMIA: Primary | ICD-10-CM

## 2024-12-26 DIAGNOSIS — Z13.1 SCREENING FOR DIABETES MELLITUS: ICD-10-CM

## 2024-12-26 DIAGNOSIS — Z00.00 ENCOUNTER FOR MEDICARE ANNUAL WELLNESS EXAM: ICD-10-CM

## 2024-12-26 DIAGNOSIS — N18.31 STAGE 3A CHRONIC KIDNEY DISEASE (H): ICD-10-CM

## 2024-12-26 DIAGNOSIS — E03.8 OTHER SPECIFIED HYPOTHYROIDISM: ICD-10-CM

## 2024-12-26 DIAGNOSIS — R29.2 ABNORMAL REFLEX: ICD-10-CM

## 2024-12-26 DIAGNOSIS — M54.16 LUMBAR RADICULAR PAIN: ICD-10-CM

## 2024-12-26 LAB
ALBUMIN SERPL BCG-MCNC: 4 G/DL (ref 3.5–5.2)
ALP SERPL-CCNC: 85 U/L (ref 40–150)
ALT SERPL W P-5'-P-CCNC: 19 U/L (ref 0–70)
ANION GAP SERPL CALCULATED.3IONS-SCNC: 10 MMOL/L (ref 7–15)
AST SERPL W P-5'-P-CCNC: 23 U/L (ref 0–45)
BILIRUB SERPL-MCNC: 0.6 MG/DL
BUN SERPL-MCNC: 19.1 MG/DL (ref 8–23)
CALCIUM SERPL-MCNC: 9.4 MG/DL (ref 8.8–10.4)
CHLORIDE SERPL-SCNC: 104 MMOL/L (ref 98–107)
CHOLEST SERPL-MCNC: 191 MG/DL
CREAT SERPL-MCNC: 1.17 MG/DL (ref 0.67–1.17)
EGFRCR SERPLBLD CKD-EPI 2021: 63 ML/MIN/1.73M2
EST. AVERAGE GLUCOSE BLD GHB EST-MCNC: 108 MG/DL
FASTING STATUS PATIENT QL REPORTED: NO
FASTING STATUS PATIENT QL REPORTED: NO
GLUCOSE SERPL-MCNC: 90 MG/DL (ref 70–99)
HBA1C MFR BLD: 5.4 % (ref 0–5.6)
HCO3 SERPL-SCNC: 25 MMOL/L (ref 22–29)
HDLC SERPL-MCNC: 81 MG/DL
LDLC SERPL CALC-MCNC: 88 MG/DL
NONHDLC SERPL-MCNC: 110 MG/DL
POTASSIUM SERPL-SCNC: 4.3 MMOL/L (ref 3.4–5.3)
PROT SERPL-MCNC: 6.7 G/DL (ref 6.4–8.3)
PSA SERPL DL<=0.01 NG/ML-MCNC: 7.1 NG/ML
SODIUM SERPL-SCNC: 139 MMOL/L (ref 135–145)
T4 FREE SERPL-MCNC: 0.93 NG/DL (ref 0.9–1.7)
TRIGL SERPL-MCNC: 111 MG/DL
TSH SERPL DL<=0.005 MIU/L-ACNC: 7.34 UIU/ML (ref 0.3–4.2)
URATE SERPL-MCNC: 4.6 MG/DL (ref 3.4–7)
VIT B12 SERPL-MCNC: 1183 PG/ML (ref 232–1245)

## 2024-12-26 RX ORDER — GABAPENTIN 100 MG/1
100 CAPSULE ORAL 2 TIMES DAILY
Qty: 180 CAPSULE | Refills: 3 | Status: SHIPPED | OUTPATIENT
Start: 2024-12-26

## 2024-12-26 RX ORDER — THYROID 30 MG/1
15 TABLET ORAL DAILY
Qty: 90 TABLET | Refills: 3 | Status: SHIPPED | OUTPATIENT
Start: 2024-12-26

## 2024-12-26 RX ORDER — ALLOPURINOL 100 MG/1
100 TABLET ORAL DAILY
Qty: 90 TABLET | Refills: 3 | Status: SHIPPED | OUTPATIENT
Start: 2024-12-26

## 2024-12-26 RX ORDER — SILDENAFIL CITRATE 20 MG/1
TABLET ORAL
COMMUNITY
Start: 2024-11-11

## 2024-12-26 SDOH — HEALTH STABILITY: PHYSICAL HEALTH: ON AVERAGE, HOW MANY MINUTES DO YOU ENGAGE IN EXERCISE AT THIS LEVEL?: PATIENT DECLINED

## 2024-12-26 SDOH — HEALTH STABILITY: PHYSICAL HEALTH
ON AVERAGE, HOW MANY DAYS PER WEEK DO YOU ENGAGE IN MODERATE TO STRENUOUS EXERCISE (LIKE A BRISK WALK)?: PATIENT DECLINED

## 2024-12-26 ASSESSMENT — SOCIAL DETERMINANTS OF HEALTH (SDOH): HOW OFTEN DO YOU GET TOGETHER WITH FRIENDS OR RELATIVES?: TWICE A WEEK

## 2024-12-26 NOTE — PROGRESS NOTES
Preventive Care Visit  [facility]  Myke Yates MD, Internal Medicine - Pediatrics  Dec 26, 2024      Assessment & Plan     Mixed hyperlipidemia  LDL Cholesterol Calculated   Date Value Ref Range Status   12/26/2024 88 <100 mg/dL Final     - Lipid panel reflex to direct LDL Non-fasting; Future  - Lipid panel reflex to direct LDL Non-fasting    Nocturia associated with benign prostatic hyperplasia  Stable on Flomax no acute issues.    Chronic gout without tophus, unspecified cause, unspecified site  Doing well on allopurinol no recent flares feels this is working well  - Uric acid; Future  - allopurinol (ZYLOPRIM) 100 MG tablet; Take 1 tablet (100 mg) by mouth daily.  - Uric acid    Lumbar radicular pain  He is having ongoing significant pain he does feel fatigued on the gabapentin and does not want to go up on dose and notes that possibly his vision becomes less clear when he takes this as well.  He will continue to take it at night.  Occasionally will take in the morning as well.  Will refer to the pain clinic as he is noted back injections have been helpful in the past wonder if other evaluation may be helpful  - gabapentin (NEURONTIN) 100 MG capsule; Take 1 capsule (100 mg) by mouth 2 times daily.    Lumbar radiculopathy, chronic  - Pain Management  Referral; Future    Malignant tumor of prostate (H)  Requires ongoing monitoring not receiving treatment right now.  Prostate Specific Antigen Screen   Date Value Ref Range Status   09/10/2021 3.85 0.00 - 6.50 ug/L Final     PSA Tumor Marker   Date Value Ref Range Status   12/26/2024 7.10 ng/mL Final     Comment:     No reference ranges have been established for patients over 80 years.   PSA in February 2024 was 6.3, 2 years ago was 7.96.  - PSA, tumor marker; Future  - PSA, tumor marker    Left hip pain  Will address lower back pain as noted he reports having a x-ray done of the left hip at [facility].    Stage 3a  "chronic kidney disease (H)  Avoid nephrotoxic medications labs today  - Comprehensive metabolic panel; Future  - Vitamin B12; Future  - Comprehensive metabolic panel  - Vitamin B12    Screening for diabetes mellitus  - Hemoglobin A1c; Future  - Hemoglobin A1c    Abnormal reflex  - Vitamin B12; Future  - Vitamin B12    Other specified hypothyroidism  Patient is on Stanley Thyroid by his request his wife is on Stanley Thyroid and he wanted to be put on this in the past.  Had originally recommended levothyroxine.  Labs done today.  - TSH with free T4 reflex; Future  - TSH with free T4 reflex  - T4 free    Encounter for Medicare annual wellness exam  Meaghan routine health guidance.  Including continue to stay physically active.            BMI  Estimated body mass index is 28.35 kg/m  as calculated from the following:    Height as of this encounter: 1.664 m (5' 5.5\").    Weight as of this encounter: 78.5 kg (173 lb).   Weight management plan: Discussed healthy diet and exercise guidelines    Counseling  Appropriate preventive services were addressed with this patient via screening, questionnaire, or discussion as appropriate for fall prevention, nutrition, physical activity, Tobacco-use cessation, social engagement, weight loss and cognition.  Checklist reviewing preventive services available has been given to the patient.  Reviewed patient's diet, addressing concerns and/or questions.   He is at risk for psychosocial distress and has been provided with information to reduce risk.   The patient reports drinking more than 3 alcoholic drinks per day and/or more than 7 drhnks per week. The patient was counseled and given information about possible harmful effects of excessive alcohol intake.        Lawrence Merino is a 79 year old, presenting for the following:  Annual Visit (Nonfasting )        12/26/2024    11:09 AM   Additional Questions   Roomed by Bryn MACHADO     HPI      Worried about being able to walk. Left side on the hip " and left back pain. Went to orthopedics- said couldn't do anything on the back. Tried steroid pack without benefit. Did injections in the back and then symptoms came back.Did so physical therapy with the left hip in October, has been having some right shoulder pain as well- history of torn ligament with reattachment in the past.       Wants to check PSA.    Was having some chest pain- was seen in the ER      Energy seems ok- the thyroid feels that slightly sleepy.    Gabapentin may be causing sleepiness at times.    Surgery in both shoulders-  tore ligaments in the right side before. Now has to stretch a couple times per day.         Health Care Directive  Patient does not have a Health Care Directive: Discussed advance care planning with patient; information given to patient to review.      12/26/2024   General Health   How would you rate your overall physical health? Good   Feel stress (tense, anxious, or unable to sleep) To some extent   (!) STRESS CONCERN      12/26/2024   Nutrition   Diet: Regular (no restrictions)         12/26/2024   Exercise   Days per week of moderate/strenous exercise Patient declined   Average minutes spent exercising at this level Patient declined         12/26/2024   Social Factors   Frequency of gathering with friends or relatives Twice a week   Worry food won't last until get money to buy more No   Food not last or not have enough money for food? No   Do you have housing? (Housing is defined as stable permanent housing and does not include staying ouside in a car, in a tent, in an abandoned building, in an overnight shelter, or couch-surfing.) Yes   Are you worried about losing your housing? No   Lack of transportation? No   Unable to get utilities (heat,electricity)? No         12/26/2024   Fall Risk   Fallen 2 or more times in the past year? Yes   Trouble with walking or balance? Yes   Gait Speed Test (Document in seconds) 3.41   Gait Speed Test Interpretation Less than or equal to  5.00 seconds - PASS          12/26/2024   Activities of Daily Living- Home Safety   Needs help with the following daily activites None of the above   Safety concerns in the home None of the above         12/26/2024   Dental   Dentist two times every year? Yes         12/26/2024   Hearing Screening   Hearing concerns? None of the above         12/26/2024   Driving Risk Screening   Patient/family members have concerns about driving No         12/26/2024   General Alertness/Fatigue Screening   Have you been more tired than usual lately? No         12/26/2024   Urinary Incontinence Screening   Bothered by leaking urine in past 6 months No         12/26/2024   TB Screening   Were you born outside of the [place]? No         Today's PHQ-2 Score:       12/26/2024    10:58 AM   PHQ-2 ( 1999 [employer])   Q1: Little interest or pleasure in doing things 1   Q2: Feeling down, depressed or hopeless 0   PHQ-2 Score 1    Q1: Little interest or pleasure in doing things Several days   Q2: Feeling down, depressed or hopeless Not at all   PHQ-2 Score 1       Patient-reported           12/26/2024   Substance Use   Alcohol more than 3/day or more than 7/wk Yes   How often do you have a drink containing alcohol 4 or more times a week   How many alcohol drinks on typical day 1 or 2   How often do you have 5+ drinks at one occasion Never   Audit 2/3 Score 0   How often not able to stop drinking once started Never   How often failed to do what normally expected Never   How often needed first drink in am after a heavy drinking session Never   How often feeling of guilt or remorse after drinking Never   How often unable to remember what happened the night before Never   Have you or someone else been injured because of your drinking No   Has anyone been concerned or suggested you cut down on drinking No   TOTAL SCORE - AUDIT 4   Do you have a current opioid prescription? No   How severe/bad is pain from 1 to 10? 3/10   Do you use any other substances  "recreationally? No     Social History     Tobacco Use    Smoking status: Never     Passive exposure: Never    Smokeless tobacco: Never   Vaping Use    Vaping status: Never Used   Substance Use Topics    Alcohol use: Yes     Alcohol/week: 14.0 standard drinks of alcohol    Drug use: No                 Reviewed and updated as needed this visit by Provider                      Current providers sharing in care for this patient include:  Patient Care Team:  Myke Yates MD as PCP - General (Internal Medicine - Pediatrics)  Siobhan Almazan PA-C as Physician Assistant (Urology)  Myke Yates MD as Assigned PCP  Connie Juárez PA-C as Assigned Musculoskeletal Provider    The following health maintenance items are reviewed in Epic and correct as of today:  Health Maintenance   Topic Date Due    RSV VACCINE (1 - 1-dose 75+ series) Never done    INFLUENZA VACCINE (1) Never done    COVID-19 Vaccine (5 - 2024-25 season) 09/01/2024    LIPID  11/24/2024    URIC ACID  11/24/2024    ANNUAL REVIEW OF HM ORDERS  11/27/2024    MEDICARE ANNUAL WELLNESS VISIT  11/27/2024    MICROALBUMIN  02/19/2025    TSH W/FREE T4 REFLEX  02/19/2025    BMP  12/14/2025    HEMOGLOBIN  12/14/2025    FALL RISK ASSESSMENT  12/26/2025    GLUCOSE  12/14/2027    ADVANCE CARE PLANNING  11/27/2028    DTAP/TDAP/TD IMMUNIZATION (3 - Td or Tdap) 08/18/2030    PHQ-2 (once per calendar year)  Completed    Pneumococcal Vaccine: 50+ Years  Completed    URINALYSIS  Completed    HPV IMMUNIZATION  Aged Out    MENINGITIS IMMUNIZATION  Aged Out    RSV MONOCLONAL ANTIBODY  Aged Out    COLORECTAL CANCER SCREENING  Discontinued    ZOSTER IMMUNIZATION  Discontinued            Objective    Exam  /76 (BP Location: Right arm, Patient Position: Sitting, Cuff Size: Adult Regular)   Pulse 56   Temp 97.5  F (36.4  C) (Temporal)   Resp 16   Ht 1.664 m (5' 5.5\")   Wt 78.5 kg (173 lb)   SpO2 98%   BMI 28.35 kg/m     Estimated body mass index is 28.35 kg/m  as " "calculated from the following:    Height as of this encounter: 1.664 m (5' 5.5\").    Weight as of this encounter: 78.5 kg (173 lb).    Physical Exam  GENERAL: alert and no distress  HEENT sclera clear normal tympanic membranes bilaterally, no carotid bruit  NECK: no adenopathy, no asymmetry, masses, or scars  RESP: lungs clear to auscultation - no rales, rhonchi or wheezes  CV: regular rate and rhythm, normal S1 S2, no S3 or S4, no murmur, click or rub, no peripheral edema  ABDOMEN: soft, nontender, no hepatosplenomegaly, no masses and bowel sounds normal  MS: Tender along the lower aspect of the left spine back area.  Normal internal and external rotation of the left hip.  No focal deficits.  PSYCH: mentation appears normal, affect normal/bright         12/26/2024   Mini Cog   Clock Draw Score 2 Normal   3 Item Recall 3 objects recalled   Mini Cog Total Score 5              Signed Electronically by: Myke Yates MD    "

## 2024-12-26 NOTE — PATIENT INSTRUCTIONS
We will check the thyroid function today- we can go up on the armour thyroid if needed.     They will call to set up with the pain team.     Labs today as we discussed.     Let me know if issues are coming up.    Let me know if the pleurisy gets worse- we can do antibiotics Monday if not improving.    Myke Yates MD      Patient Education   Preventive Care Advice   This is general advice given by our system to help you stay healthy. However, your care team may have specific advice just for you. Please talk to your care team about your preventive care needs.  Nutrition  Eat 5 or more servings of fruits and vegetables each day.  Try wheat bread, brown rice and whole grain pasta (instead of white bread, rice, and pasta).  Get enough calcium and vitamin D. Check the label on foods and aim for 100% of the RDA (recommended daily allowance).  Lifestyle  Exercise at least 150 minutes each week  (30 minutes a day, 5 days a week).  Do muscle strengthening activities 2 days a week. These help control your weight and prevent disease.  No smoking.  Wear sunscreen to prevent skin cancer.  Have a dental exam and cleaning every 6 months.  Yearly exams  See your health care team every year to talk about:  Any changes in your health.  Any medicines your care team has prescribed.  Preventive care, family planning, and ways to prevent chronic diseases.  Shots (vaccines)   HPV shots (up to age 26), if you've never had them before.  Hepatitis B shots (up to age 59), if you've never had them before.  COVID-19 shot: Get this shot when it's due.  Flu shot: Get a flu shot every year.  Tetanus shot: Get a tetanus shot every 10 years.  Pneumococcal, hepatitis A, and RSV shots: Ask your care team if you need these based on your risk.  Shingles shot (for age 50 and up)  General health tests  Diabetes screening:  Starting at age 35, Get screened for diabetes at least every 3 years.  If you are younger than age 35, ask your care team if you should  be screened for diabetes.  Cholesterol test: At age 39, start having a cholesterol test every 5 years, or more often if advised.  Bone density scan (DEXA): At age 50, ask your care team if you should have this scan for osteoporosis (brittle bones).  Hepatitis C: Get tested at least once in your life.  STIs (sexually transmitted infections)  Before age 24: Ask your care team if you should be screened for STIs.  After age 24: Get screened for STIs if you're at risk. You are at risk for STIs (including HIV) if:  You are sexually active with more than one person.  You don't use condoms every time.  You or a partner was diagnosed with a sexually transmitted infection.  If you are at risk for HIV, ask about PrEP medicine to prevent HIV.  Get tested for HIV at least once in your life, whether you are at risk for HIV or not.  Cancer screening tests  Cervical cancer screening: If you have a cervix, begin getting regular cervical cancer screening tests starting at age 21.  Breast cancer scan (mammogram): If you've ever had breasts, begin having regular mammograms starting at age 40. This is a scan to check for breast cancer.  Colon cancer screening: It is important to start screening for colon cancer at age 45.  Have a colonoscopy test every 10 years (or more often if you're at risk) Or, ask your provider about stool tests like a FIT test every year or Cologuard test every 3 years.  To learn more about your testing options, visit:   .  For help making a decision, visit:   https://bit.ly/hz61982.  Prostate cancer screening test: If you have a prostate, ask your care team if a prostate cancer screening test (PSA) at age 55 is right for you.  Lung cancer screening: If you are a current or former smoker ages 50 to 80, ask your care team if ongoing lung cancer screenings are right for you.  For informational purposes only. Not to replace the advice of your health care provider. Copyright   2023 [employer]. All rights  reserved. Clinically reviewed by the [facility]itions Program. [employer] [id number] - REV 01/24.  Preventing Falls: Care Instructions  Injuries and health problems such as trouble walking or poor eyesight can increase your risk of falling. So can some medicines. But there are things you can do to help prevent falls. You can exercise to get stronger. You can also arrange your home to make it safer.    Talk to your doctor about the medicines you take. Ask if any of them increase the risk of falls and whether they can be changed or stopped.   Try to exercise regularly. It can help improve your strength and balance. This can help lower your risk of falling.         Practice fall safety and prevention.   Wear low-heeled shoes that fit well and give your feet good support. Talk to your doctor if you have foot problems that make this hard.  Carry a cellphone or wear a medical alert device that you can use to call for help.  Use stepladders instead of chairs to reach high objects. Don't climb if you're at risk for falls. Ask for help, if needed.  Wear the correct eyeglasses, if you need them.        Make your home safer.   Remove rugs, cords, clutter, and furniture from walkways.  Keep your house well lit. Use night-lights in hallways and bathrooms.  Install and use sturdy handrails on stairways.  Wear nonskid footwear, even inside. Don't walk barefoot or in socks without shoes.        Be safe outside.   Use handrails, curb cuts, and ramps whenever possible.  Keep your hands free by using a shoulder bag or backpack.  Try to walk in well-lit areas. Watch out for uneven ground, changes in pavement, and debris.  Be careful in the winter. Walk on the grass or gravel when sidewalks are slippery. Use de-icer on steps and walkways. Add non-slip devices to shoes.    Put grab bars and nonskid mats in your shower or tub and near the toilet. Try to use a shower chair or bath bench when bathing.   Get into a tub or shower by  "putting in your weaker leg first. Get out with your strong side first. Have a phone or medical alert device in the bathroom with you.   Where can you learn more?  Go to https://www.CloudAmboÂ®.net/patiented  Enter G117 in the search box to learn more about \"Preventing Falls: Care Instructions.\"  Current as of: July 31, 2024  Content Version: 14.3    2024 [employer].   Care instructions adapted under license by your healthcare professional. If you have questions about a medical condition or this instruction, always ask your healthcare professional. [employer] disclaims any warranty or liability for your use of this information.    Learning About Stress  What is stress?     Stress is your body's response to a hard situation. Your body can have a physical, emotional, or mental response. Stress is a fact of life for most people, and it affects everyone differently. What causes stress for you may not be stressful for someone else.  A lot of things can cause stress. You may feel stress when you go on a job interview, take a test, or run a race. This kind of short-term stress is normal and even useful. It can help you if you need to work hard or react quickly. For example, stress can help you finish an important job on time.  Long-term stress is caused by ongoing stressful situations or events. Examples of long-term stress include long-term health problems, ongoing problems at work, or conflicts in your family. Long-term stress can harm your health.  How does stress affect your health?  When you are stressed, your body responds as though you are in danger. It makes hormones that speed up your heart, make you breathe faster, and give you a burst of energy. This is called the fight-or-flight stress response. If the stress is over quickly, your body goes back to normal and no harm is done.  But if stress happens too often or lasts too long, it can have bad effects. Long-term stress can make you more " likely to get sick, and it can make symptoms of some diseases worse. If you tense up when you are stressed, you may develop neck, shoulder, or low back pain. Stress is linked to high blood pressure and heart disease.  Stress also harms your emotional health. It can make you littlejohn, tense, or depressed. Your relationships may suffer, and you may not do well at work or school.  What can you do to manage stress?  You can try these things to help manage stress:   Do something active. Exercise or activity can help reduce stress. Walking is a great way to get started. Even everyday activities such as housecleaning or yard work can help.  Try yoga or nathan chi. These techniques combine exercise and meditation. You may need some training at first to learn them.  Do something you enjoy. For example, listen to music or go to a movie. Practice your hobby or do volunteer work.  Meditate. This can help you relax, because you are not worrying about what happened before or what may happen in the future.  Do guided imagery. Imagine yourself in any setting that helps you feel calm. You can use online videos, books, or a teacher to guide you.  Do breathing exercises. For example:  From a standing position, bend forward from the waist with your knees slightly bent. Let your arms dangle close to the floor.  Breathe in slowly and deeply as you return to a standing position. Roll up slowly and lift your head last.  Hold your breath for just a few seconds in the standing position.  Breathe out slowly and bend forward from the waist.  Let your feelings out. Talk, laugh, cry, and express anger when you need to. Talking with supportive friends or family, a counselor, or a sandro leader about your feelings is a healthy way to relieve stress. Avoid discussing your feelings with people who make you feel worse.  Write. It may help to write about things that are bothering you. This helps you find out how much stress you feel and what is causing it.  "When you know this, you can find better ways to cope.  What can you do to prevent stress?  You might try some of these things to help prevent stress:  Manage your time. This helps you find time to do the things you want and need to do.  Get enough sleep. Your body recovers from the stresses of the day while you are sleeping.  Get support. Your family, friends, and community can make a difference in how you experience stress.  Limit your news feed. Avoid or limit time on social media or news that may make you feel stressed.  Do something active. Exercise or activity can help reduce stress. Walking is a great way to get started.  Where can you learn more?  Go to https://www.Marketecture.net/patiented  Enter N032 in the search box to learn more about \"Learning About Stress.\"  Current as of: October 24, 2023  Content Version: 14.3    2024 [employer].   Care instructions adapted under license by your healthcare professional. If you have questions about a medical condition or this instruction, always ask your healthcare professional. [employer] disclaims any warranty or liability for your use of this information.    9 Ways to Cut Back on Drinking  Maybe you've found yourself drinking more alcohol than you'd prefer. If you want to cut back, here are some ideas to try.    Think before you drink.  Do you really want a drink, or is it just a habit? If you're used to having a drink at a certain time, try doing something else then.     Look for substitutes.  Find some no-alcohol drinks that you enjoy, like flavored seltzer water, tea with honey, or tonic with a slice of lime. Or try alcohol-free beer or \"virgin\" cocktails (without the alcohol).     Drink more water.  Use water to quench your thirst. Drink a glass of water before you have any alcohol. Have another glass along with every drink or between drinks.     Shrink your drink.  For example, have a bottle of beer instead of a pint. Use a smaller glass " "for wine. Choose drinks with lower alcohol content (ABV%). Or use less liquor and more mixer in cocktails.     Slow down.  It's easy to drink quickly and without thinking about it. Pay attention, and make each drink last longer.     Do the math.  Total up how much you spend on alcohol each month. How much is that a year? If you cut back, what could you do with the money you save?     Take a break.  Choose a day or two each week when you won't drink at all. Notice how you feel on those days, physically and emotionally. How did you sleep? Do you feel better? Over time, add more break days.     Count calories.  Would you like to lose some weight? For some people that's a good motivator for cutting back. Figure out how many calories are in each drink. How many does that add up to in a day? In a week? In a month?     Practice saying no.  Be ready when someone offers you a drink. Try: \"Thanks, I've had enough.\" Or \"Thanks, but I'm cutting back.\" Or \"No, thanks. I feel better when I drink less.\"   Current as of: November 15, 2023  Content Version: 14.3    2024 [employer].   Care instructions adapted under license by your healthcare professional. If you have questions about a medical condition or this instruction, always ask your healthcare professional. [employer] disclaims any warranty or liability for your use of this information.     "

## 2025-01-24 ENCOUNTER — MYC MEDICAL ADVICE (OUTPATIENT)
Dept: FAMILY MEDICINE | Facility: CLINIC | Age: 80
End: 2025-01-24
Payer: COMMERCIAL

## 2025-01-27 NOTE — TELEPHONE ENCOUNTER
OV 12/26/24  Other specified hypothyroidism  Patient is on Chrisman Thyroid by his request his wife is on Chrisman Thyroid and he wanted to be put on this in the past.  Had originally recommended levothyroxine.  Labs done today.  - TSH with free T4 reflex; Future  - TSH with free T4 reflex  - T4 free

## 2025-02-10 ENCOUNTER — MYC MEDICAL ADVICE (OUTPATIENT)
Dept: FAMILY MEDICINE | Facility: CLINIC | Age: 80
End: 2025-02-10
Payer: COMMERCIAL

## 2025-02-10 ASSESSMENT — ANXIETY QUESTIONNAIRES
4. TROUBLE RELAXING: NOT AT ALL
6. BECOMING EASILY ANNOYED OR IRRITABLE: NOT AT ALL
5. BEING SO RESTLESS THAT IT IS HARD TO SIT STILL: NOT AT ALL
2. NOT BEING ABLE TO STOP OR CONTROL WORRYING: NOT AT ALL
GAD7 TOTAL SCORE: 1
3. WORRYING TOO MUCH ABOUT DIFFERENT THINGS: NOT AT ALL
IF YOU CHECKED OFF ANY PROBLEMS ON THIS QUESTIONNAIRE, HOW DIFFICULT HAVE THESE PROBLEMS MADE IT FOR YOU TO DO YOUR WORK, TAKE CARE OF THINGS AT HOME, OR GET ALONG WITH OTHER PEOPLE: NOT DIFFICULT AT ALL
1. FEELING NERVOUS, ANXIOUS, OR ON EDGE: SEVERAL DAYS
7. FEELING AFRAID AS IF SOMETHING AWFUL MIGHT HAPPEN: NOT AT ALL

## 2025-02-10 ASSESSMENT — PAIN SCALES - PAIN ENJOYMENT GENERAL ACTIVITY SCALE (PEG)
PEG_TOTALSCORE: 5.67
INTERFERED_ENJOYMENT_LIFE: 5
AVG_PAIN_PASTWEEK: 5
INTERFERED_GENERAL_ACTIVITY: 7

## 2025-02-11 ASSESSMENT — PAIN SCALES - PAIN ENJOYMENT GENERAL ACTIVITY SCALE (PEG)
INTERFERED_GENERAL_ACTIVITY: 7
PEG_TOTALSCORE: 5.67
AVG_PAIN_PASTWEEK: 5
INTERFERED_ENJOYMENT_LIFE: 5

## 2025-02-11 ASSESSMENT — ANXIETY QUESTIONNAIRES
8. IF YOU CHECKED OFF ANY PROBLEMS, HOW DIFFICULT HAVE THESE MADE IT FOR YOU TO DO YOUR WORK, TAKE CARE OF THINGS AT HOME, OR GET ALONG WITH OTHER PEOPLE?: NOT DIFFICULT AT ALL
GAD7 TOTAL SCORE: 1
7. FEELING AFRAID AS IF SOMETHING AWFUL MIGHT HAPPEN: NOT AT ALL
GAD7 TOTAL SCORE: 1

## 2025-02-12 NOTE — TELEPHONE ENCOUNTER
Outgoing call to patient. Relayed PCP's detailed message. He understands he is not due for his PSA test yet and that the interventional eval is from Dr. Yates' referral to Pain Management for his Lumbar Radiculopathy which was placed on last OV 12/26/24. Patient has now recalled and verbalized understanding and will see a provider there on 2/25/25. No further questions/concerns at this time.

## 2025-02-20 NOTE — PROGRESS NOTES
Assessment:   Rasheed Hanson is a 79 y.o. male with past medical history significant for prostate cancer, gout, hyperlipidemia who presents today for follow-up regarding chronic right knee pain.  Pain began 1 year ago with no trauma.  Pain is at the medial aspect of the right knee.  Suspect right knee osteoarthritis or pes anserine pain syndrome.  He has not had any imaging of the knee.  - I have also seen this patient for neck pain, shoulder pain, back pain, hip pain.  He wants to focus on his right knee pain today.       Plan:     A shared decision making plan was used.  The patient's values and choices were respected.  The following represents what was discussed and decided upon by the physician assistant and the patient.      1.  DIAGNOSTIC TESTS:    - I did x-rays of the right knee.  - We also had the patient signed a release of information because he had an MRI lumbar spine through [facility] in 2024.    2.  PHYSICAL THERAPY: I entered a referral for physical therapy for knee pain.    3.  MEDICATIONS: No changes are made to the patient's medications.  - Patient takes gabapentin 100 to 200 mg at bedtime  - Patient applies Voltaren gel to the right knee which is somewhat helpful.    4.  INTERVENTIONS:    -No interventions were ordered today.  We will await the results of the x-ray.  I would likely offer a right knee joint injection under ultrasound guidance.    5.  PATIENT EDUCATION: Patient is in agreement the above plan.  All questions were answered.  - I told the patient if his pain fails to improve with conservative measures I recommend a referral to orthopedics.    6.  FOLLOW-UP: My nurse will call the patient with the results of his x-ray.  At that time I will likely offer an injection.  If he has questions or concerns, he should not hesitate to call.    Subjective:     Rasheed Hanson is a 79 year old male who presents today for follow-up regarding chronic right knee pain.  Patient reports  that knee pain began about 1 year ago.  He denies any injury or event to cause the pain.  Pain is located on the right medial knee.  He denies any swelling associated with the knee.  He states the pain is worse with walking.  He can only walk about a half a mile because of the pain.  With walking his pain reaches a level 8 or 9 out of 10.  Pain improves with rest and applying ice.  He states that medial aspect of his knee feels stiff.  Denies recent fevers.  He rates his pain today as a 3 out of 10.        Treatment to date:  - Bilateral shoulder surgery with Dr. Weller years ago  - Physical therapy for the low back and left hip July through November 2020  - Physical therapy for left shoulder pain September through November 2020  - Left intra-articular hip joint injection under ultrasound guidance June 6, 2024  - Right glenohumeral joint injection under ultrasound guidance October 6, 2023 with 90% relief  - Left intra-articular hip joint injection under ultrasound guidance July 22, 2022 with 80% relief of pain x8 months  - Right L4-5 transforaminal epidural steroid injection May 13, 2022 which continues to provide relief of right-sided pain  - Gabapentin 100 to 200 mg at bedtime is helpful  - Voltaren gel somewhat helpful    Review of Systems:  Positive for trip/stumble/falls.  Negative for numbness/tingling, weakness, loss of bowel/bladder control, inability to urinate, headache, pain much worse at night, difficulty swallowing, difficulty with hand skills, fevers, unintentional weight loss.     Objective:   CONSTITUTIONAL:  Vital signs as above.  No acute distress.  The patient is well nourished and well groomed.    PSYCHIATRIC:  The patient is awake, alert, oriented to person, place and time.  The patient is answering questions appropriately with clear speech.  Normal affect.  HEENT: Normocephalic, atraumatic.  Sclera clear.    SKIN: Exposed skin is clean, dry, intact without rashes.  MUSCULOSKELETAL: Right knee  shows no swelling or erythema.  Range of motion of the right knee is full.  There is tenderness to palpation medial aspect of the right knee joint line.  Ambulates with a mildly antalgic gait, favoring the right.  5/5 strength bilateral hip flexors, knee flexors/extensors, ankle dorsi/plantar flexors.  NEUROLOGICAL: Sensation light touch intact bilateral lower extremities.

## 2025-02-25 ENCOUNTER — OFFICE VISIT (OUTPATIENT)
Dept: PHYSICAL MEDICINE AND REHAB | Facility: CLINIC | Age: 80
End: 2025-02-25
Payer: COMMERCIAL

## 2025-02-25 VITALS
DIASTOLIC BLOOD PRESSURE: 53 MMHG | HEIGHT: 66 IN | SYSTOLIC BLOOD PRESSURE: 87 MMHG | WEIGHT: 168.3 LBS | HEART RATE: 98 BPM | BODY MASS INDEX: 27.05 KG/M2

## 2025-02-25 DIAGNOSIS — G89.29 CHRONIC PAIN OF RIGHT KNEE: Primary | ICD-10-CM

## 2025-02-25 DIAGNOSIS — M25.561 CHRONIC PAIN OF RIGHT KNEE: Primary | ICD-10-CM

## 2025-02-25 PROCEDURE — 99214 OFFICE O/P EST MOD 30 MIN: CPT | Performed by: PHYSICIAN ASSISTANT

## 2025-02-25 ASSESSMENT — PAIN SCALES - GENERAL: PAINLEVEL_OUTOF10: MILD PAIN (3)

## 2025-02-25 NOTE — LETTER
2/25/2025      Rasheed Hanson  [address]  [place] [address]      Dear Colleague,    Thank you for referring your patient, Rasheed Hanson, to the [facility]. Please see a copy of my visit note below.    Assessment:   Rasheed Hanson is a 79 y.o. male with past medical history significant for prostate cancer, gout, hyperlipidemia who presents today for follow-up regarding chronic right knee pain.  Pain began 1 year ago with no trauma.  Pain is at the medial aspect of the right knee.  Suspect right knee osteoarthritis or pes anserine pain syndrome.  He has not had any imaging of the knee.  - I have also seen this patient for neck pain, shoulder pain, back pain, hip pain.  He wants to focus on his right knee pain today.       Plan:     A shared decision making plan was used.  The patient's values and choices were respected.  The following represents what was discussed and decided upon by the physician assistant and the patient.      1.  DIAGNOSTIC TESTS:    - I did x-rays of the right knee.  - We also had the patient signed a release of information because he had an MRI lumbar spine through [facility] in 2024.    2.  PHYSICAL THERAPY: I entered a referral for physical therapy for knee pain.    3.  MEDICATIONS: No changes are made to the patient's medications.  - Patient takes gabapentin 100 to 200 mg at bedtime  - Patient applies Voltaren gel to the right knee which is somewhat helpful.    4.  INTERVENTIONS:    -No interventions were ordered today.  We will await the results of the x-ray.  I would likely offer a right knee joint injection under ultrasound guidance.    5.  PATIENT EDUCATION: Patient is in agreement the above plan.  All questions were answered.  - I told the patient if his pain fails to improve with conservative measures I recommend a referral to orthopedics.    6.  FOLLOW-UP: My nurse will call the patient with the results of his x-ray.  At  that time I will likely offer an injection.  If he has questions or concerns, he should not hesitate to call.    Subjective:     Rasheed Hanson is a 79 year old male who presents today for follow-up regarding chronic right knee pain.  Patient reports that knee pain began about 1 year ago.  He denies any injury or event to cause the pain.  Pain is located on the right medial knee.  He denies any swelling associated with the knee.  He states the pain is worse with walking.  He can only walk about a half a mile because of the pain.  With walking his pain reaches a level 8 or 9 out of 10.  Pain improves with rest and applying ice.  He states that medial aspect of his knee feels stiff.  Denies recent fevers.  He rates his pain today as a 3 out of 10.        Treatment to date:  - Bilateral shoulder surgery with Dr. Weller years ago  - Physical therapy for the low back and left hip July through November 2020  - Physical therapy for left shoulder pain September through November 2020  - Left intra-articular hip joint injection under ultrasound guidance June 6, 2024  - Right glenohumeral joint injection under ultrasound guidance October 6, 2023 with 90% relief  - Left intra-articular hip joint injection under ultrasound guidance July 22, 2022 with 80% relief of pain x8 months  - Right L4-5 transforaminal epidural steroid injection May 13, 2022 which continues to provide relief of right-sided pain  - Gabapentin 100 to 200 mg at bedtime is helpful  - Voltaren gel somewhat helpful    Review of Systems:  Positive for trip/stumble/falls.  Negative for numbness/tingling, weakness, loss of bowel/bladder control, inability to urinate, headache, pain much worse at night, difficulty swallowing, difficulty with hand skills, fevers, unintentional weight loss.     Objective:   CONSTITUTIONAL:  Vital signs as above.  No acute distress.  The patient is well nourished and well groomed.    PSYCHIATRIC:  The patient is awake, alert, oriented  to person, place and time.  The patient is answering questions appropriately with clear speech.  Normal affect.  HEENT: Normocephalic, atraumatic.  Sclera clear.    SKIN: Exposed skin is clean, dry, intact without rashes.  MUSCULOSKELETAL: Right knee shows no swelling or erythema.  Range of motion of the right knee is full.  There is tenderness to palpation medial aspect of the right knee joint line.  Ambulates with a mildly antalgic gait, favoring the right.  5/5 strength bilateral hip flexors, knee flexors/extensors, ankle dorsi/plantar flexors.  NEUROLOGICAL: Sensation light touch intact bilateral lower extremities.        Again, thank you for allowing me to participate in the care of your patient.        Sincerely,        Connie Juárez PA-C    Electronically signed

## 2025-02-25 NOTE — PATIENT INSTRUCTIONS
[facility] Scheduling    Please call [phone] to schedule your image(s) (select option#1). There are 3 different locations, see below. You can do walk-in visits for xray only images if you want.     [facility]  [address]  [address] [place] [id number]    [facility] [place]  [address], [address]  [place] [address]     An order for physicaltherapy has been provided today.  Someone will call you to schedule physical therapy or you can call [phone] to schedule physical therapy.  It will be very important for you to do your physical therapy exercises on aregular basis to decrease your pain and prevent future flares of pain.

## 2025-03-04 ENCOUNTER — HOSPITAL ENCOUNTER (OUTPATIENT)
Dept: RADIOLOGY | Facility: CLINIC | Age: 80
Discharge: HOME | End: 2025-03-04
Attending: PHYSICIAN ASSISTANT
Payer: COMMERCIAL

## 2025-03-04 DIAGNOSIS — M25.561 CHRONIC PAIN OF RIGHT KNEE: ICD-10-CM

## 2025-03-04 DIAGNOSIS — G89.29 CHRONIC PAIN OF RIGHT KNEE: ICD-10-CM

## 2025-03-04 PROCEDURE — 73562 X-RAY EXAM OF KNEE 3: CPT | Mod: RT

## 2025-03-10 ASSESSMENT — ACTIVITIES OF DAILY LIVING (ADL)
PAIN: THE SYMPTOM AFFECTS MY ACTIVITY SEVERELY
STAND: ACTIVITY IS SOMEWHAT DIFFICULT
STIFFNESS: THE SYMPTOM AFFECTS MY ACTIVITY SLIGHTLY
KNEE_ACTIVITY_OF_DAILY_LIVING_SUM: 39
SWELLING: I DO NOT HAVE THE SYMPTOM
RAW_SCORE: 42
KNEEL ON THE FRONT OF YOUR KNEE: ACTIVITY IS SOMEWHAT DIFFICULT
LIMPING: THE SYMPTOM AFFECTS MY ACTIVITY MODERATELY
SIT WITH YOUR KNEE BENT: ACTIVITY IS MINIMALLY DIFFICULT
RISE FROM A CHAIR: ACTIVITY IS MINIMALLY DIFFICULT
LIMPING: THE SYMPTOM AFFECTS MY ACTIVITY MODERATELY
HOW_WOULD_YOU_RATE_THE_OVERALL_FUNCTION_OF_YOUR_KNEE_DURING_YOUR_USUAL_DAILY_ACTIVITIES?: ABNORMAL
PAIN: THE SYMPTOM AFFECTS MY ACTIVITY SEVERELY
GO DOWN STAIRS: ACTIVITY IS VERY DIFFICULT
GO DOWN STAIRS: ACTIVITY IS VERY DIFFICULT
STAND: ACTIVITY IS SOMEWHAT DIFFICULT
WEAKNESS: I HAVE THE SYMPTOM BUT IT DOES NOT AFFECT MY ACTIVITY
SQUAT: ACTIVITY IS SOMEWHAT DIFFICULT
WEAKNESS: I HAVE THE SYMPTOM BUT IT DOES NOT AFFECT MY ACTIVITY
SQUAT: ACTIVITY IS SOMEWHAT DIFFICULT
SWELLING: I DO NOT HAVE THE SYMPTOM
KNEE_ACTIVITY_OF_DAILY_LIVING_SCORE: 60
SIT WITH YOUR KNEE BENT: ACTIVITY IS MINIMALLY DIFFICULT
PLEASE_INDICATE_YOR_PRIMARY_REASON_FOR_REFERRAL_TO_THERAPY:: KNEE
KNEEL ON THE FRONT OF YOUR KNEE: ACTIVITY IS SOMEWHAT DIFFICULT
AS_A_RESULT_OF_YOUR_KNEE_INJURY,_HOW_WOULD_YOU_RATE_YOUR_CURRENT_LEVEL_OF_DAILY_ACTIVITY?: SEVERELY ABNORMAL
WALK: ACTIVITY IS FAIRLY DIFFICULT
GIVING WAY, BUCKLING OR SHIFTING OF KNEE: I HAVE THE SYMPTOM BUT IT DOES NOT AFFECT MY ACTIVITY
GIVING WAY, BUCKLING OR SHIFTING OF KNEE: I HAVE THE SYMPTOM BUT IT DOES NOT AFFECT MY ACTIVITY
AS_A_RESULT_OF_YOUR_KNEE_INJURY,_HOW_WOULD_YOU_RATE_YOUR_CURRENT_LEVEL_OF_DAILY_ACTIVITY?: SEVERELY ABNORMAL
STIFFNESS: THE SYMPTOM AFFECTS MY ACTIVITY SLIGHTLY
HOW_WOULD_YOU_RATE_THE_OVERALL_FUNCTION_OF_YOUR_KNEE_DURING_YOUR_USUAL_DAILY_ACTIVITIES?: ABNORMAL
WALK: ACTIVITY IS FAIRLY DIFFICULT
RISE FROM A CHAIR: ACTIVITY IS MINIMALLY DIFFICULT

## 2025-03-10 NOTE — PROGRESS NOTES
PHYSICAL THERAPY EVALUATION  Type of Visit: Evaluation       Fall Risk Screen:  Fall screen completed by: PT  Have you fallen 2 or more times in the past year?: Yes (In the dark, pt missed the step.)  Have you fallen and had an injury in the past year?: Yes  Timed Up and Go score (seconds): See eval  Is patient a fall risk?: No    Subjective   Pt is an 79 year-old man with chief complaint chronic R knee pain. Past medical history significant for prostate cancer, gout, hyperlipidemia. He [place] around [place] - after the first 1-2 miles the knee hurts more. He used to do this walk daily in the summer and negotiate stairs more. He lives in a condo now and is avoiding stairs d/t pain and last walked daily 6 months ago.   He will use a cane at times or a walking stick for walking longer distance      Presenting condition or subjective complaint: Pain in right knee  Date of onset: 02/25/25    Relevant medical history: Pain at night or rest   Dates & types of surgery: Both sxhoulders ans back surgery    Prior diagnostic imaging/testing results: X-ray     EXAM: XR KNEE RIGHT 3 VIEWS  LOCATION: [facility]  DATE: 3/4/2025     INDICATION:  Chronic pain of right knee, Chronic pain of right knee  COMPARISON: None.                                                                      IMPRESSION: Mild narrowing of the medial and patellofemoral compartments. No fracture or joint effusion.   Prior therapy history for the same diagnosis, illness or injury: No      Prior Level of Function  Transfers: Independent  Ambulation: Independent  ADL: Independent      Living Environment  Social support: With a significant other or spouse   Type of home: Apartment/condo   Stairs to enter the home: No       Ramp: Yes   Stairs inside the home: No       Help at home: None  Equipment owned: Straight Cane     Employment: No    Hobbies/Interests: hunting, fishing, [place]    Patient  goals for therapy: Walk 3 miles    Pain assessment: Pain present  Location: right medial knee /Rating: at rest: 0/10, with movement/walking more than a mile, pain is 7-8/10     Objective   KNEE EVALUATION  INTEGUMENTARY (edema, incisions): WNL  POSTURE:   GAIT:  Weightbearing Status: WBAT  Assistive Device(s): None  Gait Deviations: right trendelenburg with right stance, pt walks with right foot abducted, right hip/knee external rotation  BALANCE/PROPRIOCEPTION: TUG 8.7 sec  WEIGHTBEARING ALIGNMENT:   NON-WEIGHTBEARING ALIGNMENT:   ROM:   (Degrees) Right AROM Right PROM Left AROM Left PROM   Knee Flexion 134 with low back pain  139    Knee Extension 2 deg hyperextension  2 deg hyperextension      STRENGTH:   Pain: - none + mild ++ moderate +++ severe  Strength Scale: 0-5/5 Right Left   Knee Flexion 5 5   Knee Extension 5 5   Quad Set 5 5   B hip flx: 4+/5 with pain on right   B ankle DF: 5/5  B hip adduction: 4/5  B hip abduction: 3+/5    FLEXIBILITY:   SPECIAL TESTS:    Right Left   Thessaly's (Meniscus) Negative     Shima's (Meniscus) Negative  Negative    Lucero's (ITB/TFL) Positive Positive   Patellar Apprehension Test     Patella Tracking     Ligamentous Stability     Anterior Drawer (ACL) Negative,   Slight laxity   Posterior Drawer (PCL) Negative,   Negative,     Prone Dial Test at 30 Deg and 90 Deg (PCL/PLC)     Valgus Stress Testing at 0 Deg and 30 Deg Negative,   Negative,     Varus Stress Testing at 0 Deg and 30 Deg  Negative,   Negative,       FUNCTIONAL TESTS:   PALPATION: tender right medial knee at MCL and distal hamstrings tendon and slightly into pes anserine  JOINT MOBILITY:     Assessment & Plan   CLINICAL IMPRESSIONS  Medical Diagnosis: Chronic pain of right knee    Treatment Diagnosis: Chronic pain of right knee   Impression/Assessment: Pt is an 79 year-old man with chief complaint chronic R knee pain. Past medical history significant for prostate cancer, gout, hyperlipidemia. Recent x-ray  indicates only mild degenerative changes. Pain is at medial knee with palpation to distal hamstrings tendon and pes anserine. He demonstrates normal knee ROM and normal knee strength, though decreased B hip strength. His gait is mildly antalgic with asymmetry noted d/t trendelenbug on the R. He is limited in walking tolerance - he used to be able to walk 3 miles at the park and now the pain limits him to 1 mile or less. He is appropriate for skilled PT to address impairments, instruct in HEP to reduce pain and return to PLOF.    Clinical Decision Making (Complexity):  Clinical Presentation: Stable/Uncomplicated  Clinical Presentation Rationale: based on medical and personal factors listed in PT evaluation  Clinical Decision Making (Complexity): Low complexity    PLAN OF CARE  Treatment Interventions:  Interventions: Gait Training, Manual Therapy, Neuromuscular Re-education, Therapeutic Activity, Therapeutic Exercise, Self-Care/Home Management    Long Term Goals     PT Goal 1  Goal Identifier: Walking  Goal Description: Pt will report ability [place] d/t R knee pain.  Target Date: 06/08/25  PT Goal 2  Goal Identifier: Pain  Goal Description: Pt will report decreased R knee pain from 7-8/10 to <4/10 when walking longer distances (up to 3 miles at the park).  Target Date: 06/08/25      Frequency of Treatment: every 2 weeks  Duration of Treatment: 5-6 visits, up to 90 days           Risks and benefits of evaluation/treatment have been explained.   Patient/Family/caregiver agrees with Plan of Care.     Evaluation Time:     PT Eval, Low Complexity Minutes (42078): 21       Signing Clinician: Alison Qiu PT        [facility]                                                                                   OUTPATIENT PHYSICAL THERAPY      PLAN OF TREATMENT FOR OUTPATIENT REHABILITATION   Patient's Last Name, First Name,  Rasheed Miranda YOB: 1945   Provider's Name   [facility]   Medical Record No.  [id number]     Onset Date: 02/25/25  Start of Care Date: 03/11/25     Medical Diagnosis:  Chronic pain of right knee      PT Treatment Diagnosis:  Chronic pain of right knee Plan of Treatment  Frequency/Duration: every 2 weeks/ 5-6 visits, up to 90 days    Certification date from 03/11/25 to 06/08/25         See note for plan of treatment details and functional goals     Alison Qiu, PT                         I CERTIFY THE NEED FOR THESE SERVICES FURNISHED UNDER        THIS PLAN OF TREATMENT AND WHILE UNDER MY CARE     (Physician attestation of this document indicates review and certification of the therapy plan).              Referring Provider:  Connie Juárez    Initial Assessment  See Epic Evaluation- Start of Care Date: 03/11/25

## 2025-03-11 ENCOUNTER — THERAPY VISIT (OUTPATIENT)
Dept: PHYSICAL THERAPY | Facility: REHABILITATION | Age: 80
End: 2025-03-11
Attending: PHYSICIAN ASSISTANT
Payer: COMMERCIAL

## 2025-03-11 DIAGNOSIS — M25.561 CHRONIC PAIN OF RIGHT KNEE: ICD-10-CM

## 2025-03-11 DIAGNOSIS — G89.29 CHRONIC PAIN OF RIGHT KNEE: ICD-10-CM

## 2025-03-11 PROCEDURE — 97161 PT EVAL LOW COMPLEX 20 MIN: CPT | Mod: GP | Performed by: PHYSICAL THERAPIST

## 2025-03-11 PROCEDURE — 97110 THERAPEUTIC EXERCISES: CPT | Mod: GP | Performed by: PHYSICAL THERAPIST

## 2025-03-25 ENCOUNTER — LAB (OUTPATIENT)
Dept: LAB | Facility: CLINIC | Age: 80
End: 2025-03-25
Payer: COMMERCIAL

## 2025-03-25 DIAGNOSIS — E03.8 OTHER SPECIFIED HYPOTHYROIDISM: ICD-10-CM

## 2025-03-25 DIAGNOSIS — N18.31 STAGE 3A CHRONIC KIDNEY DISEASE (H): Primary | ICD-10-CM

## 2025-03-25 LAB — TSH SERPL DL<=0.005 MIU/L-ACNC: 3.39 UIU/ML (ref 0.3–4.2)

## 2025-03-25 PROCEDURE — 84443 ASSAY THYROID STIM HORMONE: CPT

## 2025-03-25 PROCEDURE — 36415 COLL VENOUS BLD VENIPUNCTURE: CPT

## 2025-03-26 ENCOUNTER — MYC MEDICAL ADVICE (OUTPATIENT)
Dept: FAMILY MEDICINE | Facility: CLINIC | Age: 80
End: 2025-03-26

## 2025-03-26 NOTE — TELEPHONE ENCOUNTER
Closing this encounter as there are 3.     Will copy and paste to the other encounter.     Pay P. RN

## 2025-03-26 NOTE — TELEPHONE ENCOUNTER
Here are the results from the recent Labs that we did.     Thyroid labs are good now. Continue same dose of the medication.     Let me know if you have questions or concerns!     Sincerely,        Myke Yates MD  Internal Medicine and Pediatrics   Written by Myke Yates MD on 3/25/2025 12:47 PM CDT  Seen by patient Rasheed Hanson on 3/26/2025  7:18 AM

## 2025-03-26 NOTE — TELEPHONE ENCOUNTER
Will wait for patient's response as he has not read the follow-up message.       Santhosh MARTÍNEZ RN

## 2025-03-31 ENCOUNTER — THERAPY VISIT (OUTPATIENT)
Dept: PHYSICAL THERAPY | Facility: REHABILITATION | Age: 80
End: 2025-03-31
Payer: COMMERCIAL

## 2025-03-31 DIAGNOSIS — G89.29 CHRONIC PAIN OF RIGHT KNEE: ICD-10-CM

## 2025-03-31 DIAGNOSIS — M62.81 GENERALIZED MUSCLE WEAKNESS: Primary | ICD-10-CM

## 2025-03-31 DIAGNOSIS — M25.561 CHRONIC PAIN OF RIGHT KNEE: ICD-10-CM

## 2025-03-31 PROCEDURE — 97110 THERAPEUTIC EXERCISES: CPT | Mod: GP | Performed by: PHYSICAL THERAPY ASSISTANT

## 2025-04-14 ENCOUNTER — RADIOLOGY INJECTION OFFICE VISIT (OUTPATIENT)
Dept: PHYSICAL MEDICINE AND REHAB | Facility: CLINIC | Age: 80
End: 2025-04-14
Attending: PHYSICIAN ASSISTANT
Payer: COMMERCIAL

## 2025-04-14 ENCOUNTER — MYC REFILL (OUTPATIENT)
Dept: FAMILY MEDICINE | Facility: CLINIC | Age: 80
End: 2025-04-14

## 2025-04-14 VITALS
TEMPERATURE: 97.2 F | DIASTOLIC BLOOD PRESSURE: 64 MMHG | HEART RATE: 63 BPM | SYSTOLIC BLOOD PRESSURE: 124 MMHG | OXYGEN SATURATION: 97 %

## 2025-04-14 DIAGNOSIS — G89.29 CHRONIC PAIN OF RIGHT KNEE: ICD-10-CM

## 2025-04-14 DIAGNOSIS — M25.561 CHRONIC PAIN OF RIGHT KNEE: ICD-10-CM

## 2025-04-14 DIAGNOSIS — N52.9 ERECTILE DYSFUNCTION, UNSPECIFIED ERECTILE DYSFUNCTION TYPE: Primary | ICD-10-CM

## 2025-04-14 PROCEDURE — 20611 DRAIN/INJ JOINT/BURSA W/US: CPT | Mod: RT | Performed by: STUDENT IN AN ORGANIZED HEALTH CARE EDUCATION/TRAINING PROGRAM

## 2025-04-14 RX ORDER — SILDENAFIL CITRATE 20 MG/1
20-100 TABLET ORAL DAILY PRN
Qty: 30 TABLET | Refills: 3 | Status: SHIPPED | OUTPATIENT
Start: 2025-04-14

## 2025-04-14 RX ORDER — SILDENAFIL CITRATE 20 MG/1
TABLET ORAL
Status: CANCELLED | OUTPATIENT
Start: 2025-04-14

## 2025-04-14 RX ORDER — TRIAMCINOLONE ACETONIDE 40 MG/ML
INJECTION, SUSPENSION INTRA-ARTICULAR; INTRAMUSCULAR
Status: COMPLETED | OUTPATIENT
Start: 2025-04-14 | End: 2025-04-14

## 2025-04-14 RX ORDER — ROPIVACAINE HYDROCHLORIDE 5 MG/ML
INJECTION, SOLUTION EPIDURAL; INFILTRATION; PERINEURAL
Status: COMPLETED | OUTPATIENT
Start: 2025-04-14 | End: 2025-04-14

## 2025-04-14 RX ADMIN — ROPIVACAINE HYDROCHLORIDE 4 ML: 5 INJECTION, SOLUTION EPIDURAL; INFILTRATION; PERINEURAL at 09:47

## 2025-04-14 RX ADMIN — TRIAMCINOLONE ACETONIDE 40 MG: 40 INJECTION, SUSPENSION INTRA-ARTICULAR; INTRAMUSCULAR at 09:50

## 2025-04-14 ASSESSMENT — PAIN SCALES - GENERAL: PAINLEVEL_OUTOF10: NO PAIN (0)

## 2025-04-14 NOTE — PATIENT INSTRUCTIONS
DISCHARGE INSTRUCTIONS  During office hours (8:00 a.m.- 4:00 p.m.) questions or concerns may be answered  by calling [facility] Nurses at  [phone].  Messages received after hours will be returned the following business day.      In the case of an emergency, please dial 911 or seek assistance at the nearest Emergency Room/Urgent Care facility.     You may experience an increase in your symptoms for the first 2 days (It may take anywhere between 2 days-2 weeks for the steroid to have maximum effect).    You may use ice on the injection site, as frequently as 20 minutes each hour if needed.    You may take your pain medicine.    You may shower. No swimming, tub bath or hot tub for 48 hours.  You may remove your bandaid/bandage as soon as you are home.    You may resume light activities, as tolerated.    Resume your usual diet as tolerated.    POSSIBLE STEROID SIDE EFFECTS (If steroid/cortisone was used for your procedure)  -If you experience these symptoms, it should only last for a short period  Swelling of the legs              Skin redness (flushing)     Mouth (oral) irritation   Blood sugar (glucose) levels            Sweats                    Mood changes  Headache  Sleeplessness  Weakened immune system for up to 14 days  Decreased effectiveness of vaccines if given within 2 weeks of the steroid.         POSSIBLE PROCEDURE SIDE EFFECTS  -Call the [facility] if you are concerned  Increased Pain           Increased numbness/tingling      Nausea/Vomiting          Bruising/bleeding at site      Redness or swelling                                              Difficulty walking      Weakness           Fever greater than 100.5

## 2025-04-15 ENCOUNTER — TELEPHONE (OUTPATIENT)
Dept: FAMILY MEDICINE | Facility: CLINIC | Age: 80
End: 2025-04-15
Payer: COMMERCIAL

## 2025-04-17 NOTE — TELEPHONE ENCOUNTER
PRIOR AUTHORIZATION DENIED    Medication: SILDENAFIL CITRATE 20 MG PO TABS  Insurance Company: [employer] [place] - Phone [phone] Fax [phone]  Denial Date: 4/15/2025  Denial Reason(s):           Appeal Information:       Patient Notified: NO

## 2025-04-18 ENCOUNTER — MYC MEDICAL ADVICE (OUTPATIENT)
Dept: PHYSICAL MEDICINE AND REHAB | Facility: CLINIC | Age: 80
End: 2025-04-18

## 2025-04-21 ENCOUNTER — MYC MEDICAL ADVICE (OUTPATIENT)
Dept: FAMILY MEDICINE | Facility: CLINIC | Age: 80
End: 2025-04-21
Payer: COMMERCIAL

## 2025-05-12 ENCOUNTER — TRANSFERRED RECORDS (OUTPATIENT)
Dept: HEALTH INFORMATION MANAGEMENT | Facility: CLINIC | Age: 80
End: 2025-05-12
Payer: COMMERCIAL

## 2025-05-16 ENCOUNTER — HOSPITAL ENCOUNTER (OUTPATIENT)
Dept: MRI IMAGING | Facility: CLINIC | Age: 80
Discharge: HOME | End: 2025-05-16
Attending: PHYSICIAN ASSISTANT | Admitting: PHYSICIAN ASSISTANT
Payer: COMMERCIAL

## 2025-05-16 DIAGNOSIS — G89.29 CHRONIC PAIN OF RIGHT KNEE: ICD-10-CM

## 2025-05-16 DIAGNOSIS — M25.561 CHRONIC PAIN OF RIGHT KNEE: ICD-10-CM

## 2025-05-16 PROCEDURE — 73721 MRI JNT OF LWR EXTRE W/O DYE: CPT | Mod: RT

## 2025-05-19 ENCOUNTER — RESULTS FOLLOW-UP (OUTPATIENT)
Dept: PHYSICAL MEDICINE AND REHAB | Facility: CLINIC | Age: 80
End: 2025-05-19

## 2025-05-19 DIAGNOSIS — M25.561 CHRONIC PAIN OF RIGHT KNEE: Primary | ICD-10-CM

## 2025-05-19 DIAGNOSIS — G89.29 CHRONIC PAIN OF RIGHT KNEE: Primary | ICD-10-CM

## 2025-05-20 ENCOUNTER — PATIENT OUTREACH (OUTPATIENT)
Dept: CARE COORDINATION | Facility: CLINIC | Age: 80
End: 2025-05-20
Payer: COMMERCIAL

## 2025-05-22 ENCOUNTER — PATIENT OUTREACH (OUTPATIENT)
Dept: CARE COORDINATION | Facility: CLINIC | Age: 80
End: 2025-05-22
Payer: COMMERCIAL

## 2025-06-02 NOTE — PROGRESS NOTES
Scheduled for right total knee arthroplasty 7/18/25 with Dr. Almonte at [facility].    Patient called with some questions about his surgery. He said his wife didn't know if she could take care of him after surgery and he was asking about TCU. I explained to him that TCU is not usually covered after total knee replacement. He lives in a [facility] with an elevator. Explained that he should be walking independently with a walker after surgery and can put full weight on his knee. We discussed the option of home care PT when he goes home, if that is needed. He plans to go home with his wife on POD 1 with possible home care PT.    Ariadne Dudley RN

## 2025-06-02 NOTE — PROGRESS NOTES
06/02/25 0653   Discharge Planning   Patient/Family Anticipates Transition to home   Concerns to be Addressed all concerns addressed in this encounter   Living Arrangements   People in Home spouse   Type of Residence Private Residence   Is your private residence a single family home or apartment? Apartment   Number of Stairs, Within Home, Primary none   Stair Railings, Within Home, Primary none   Once home, are you able to live on one level? Yes   Bathroom Shower/Tub Walk-in shower   Equipment Currently Used at Home none   Support System   Support Systems Spouse/Significant Other   Do you have someone available to stay with you one or two nights once you are home? Yes   Falls Risk   Has the patient been identified as a high falls risk before surgery? (fallen in the last 6 months, history of falls, unsteady gait, or balance issues) Yes   Did an order get placed to attend outpatient pre-surgery balance evaluation? Yes   Relationship/Environment   Name(s) of People in Home Wife at home, may benefit from TCU if able

## 2025-06-06 ENCOUNTER — MYC MEDICAL ADVICE (OUTPATIENT)
Dept: FAMILY MEDICINE | Facility: CLINIC | Age: 80
End: 2025-06-06
Payer: COMMERCIAL

## 2025-06-09 NOTE — TELEPHONE ENCOUNTER
LOV 12/26/24:    Chronic gout without tophus, unspecified cause, unspecified site  Doing well on allopurinol no recent flares feels this is working well  - Uric acid; Future  - allopurinol (ZYLOPRIM) 100 MG tablet; Take 1 tablet (100 mg) by mouth daily.  - Uric acid

## 2025-06-13 ENCOUNTER — TRANSFERRED RECORDS (OUTPATIENT)
Dept: HEALTH INFORMATION MANAGEMENT | Facility: CLINIC | Age: 80
End: 2025-06-13
Payer: COMMERCIAL

## 2025-07-09 ENCOUNTER — OFFICE VISIT (OUTPATIENT)
Dept: FAMILY MEDICINE | Facility: CLINIC | Age: 80
End: 2025-07-09
Payer: COMMERCIAL

## 2025-07-09 VITALS
TEMPERATURE: 98.6 F | SYSTOLIC BLOOD PRESSURE: 126 MMHG | WEIGHT: 166.6 LBS | OXYGEN SATURATION: 98 % | DIASTOLIC BLOOD PRESSURE: 70 MMHG | RESPIRATION RATE: 16 BRPM | HEART RATE: 79 BPM | HEIGHT: 66 IN | BODY MASS INDEX: 26.78 KG/M2

## 2025-07-09 DIAGNOSIS — G89.29 CHRONIC PAIN OF RIGHT KNEE: ICD-10-CM

## 2025-07-09 DIAGNOSIS — M25.561 CHRONIC PAIN OF RIGHT KNEE: ICD-10-CM

## 2025-07-09 DIAGNOSIS — C61 MALIGNANT TUMOR OF PROSTATE (H): ICD-10-CM

## 2025-07-09 DIAGNOSIS — E03.8 OTHER SPECIFIED HYPOTHYROIDISM: ICD-10-CM

## 2025-07-09 DIAGNOSIS — Z87.09 HISTORY OF ACUTE BRONCHITIS WITH BRONCHOSPASM: ICD-10-CM

## 2025-07-09 DIAGNOSIS — Z01.818 PREOP GENERAL PHYSICAL EXAM: Primary | ICD-10-CM

## 2025-07-09 DIAGNOSIS — N18.31 STAGE 3A CHRONIC KIDNEY DISEASE (H): ICD-10-CM

## 2025-07-09 LAB
ALBUMIN SERPL BCG-MCNC: 3.8 G/DL (ref 3.5–5.2)
ALP SERPL-CCNC: 92 U/L (ref 40–150)
ALT SERPL W P-5'-P-CCNC: 19 U/L (ref 0–70)
ANION GAP SERPL CALCULATED.3IONS-SCNC: 10 MMOL/L (ref 7–15)
AST SERPL W P-5'-P-CCNC: 25 U/L (ref 0–45)
BASOPHILS # BLD AUTO: 0 10E3/UL (ref 0–0.2)
BASOPHILS NFR BLD AUTO: 0 %
BILIRUB SERPL-MCNC: 0.5 MG/DL
BUN SERPL-MCNC: 22.3 MG/DL (ref 8–23)
CALCIUM SERPL-MCNC: 9.8 MG/DL (ref 8.8–10.4)
CHLORIDE SERPL-SCNC: 106 MMOL/L (ref 98–107)
CREAT SERPL-MCNC: 1.18 MG/DL (ref 0.67–1.17)
CREAT UR-MCNC: 62.2 MG/DL
EGFRCR SERPLBLD CKD-EPI 2021: 62 ML/MIN/1.73M2
EOSINOPHIL # BLD AUTO: 0.2 10E3/UL (ref 0–0.7)
EOSINOPHIL NFR BLD AUTO: 3 %
ERYTHROCYTE [DISTWIDTH] IN BLOOD BY AUTOMATED COUNT: 13.1 % (ref 10–15)
GLUCOSE SERPL-MCNC: 92 MG/DL (ref 70–99)
HCO3 SERPL-SCNC: 24 MMOL/L (ref 22–29)
HCT VFR BLD AUTO: 41.4 % (ref 40–53)
HGB BLD-MCNC: 14 G/DL (ref 13.3–17.7)
IMM GRANULOCYTES # BLD: 0 10E3/UL
IMM GRANULOCYTES NFR BLD: 0 %
LYMPHOCYTES # BLD AUTO: 1.3 10E3/UL (ref 0.8–5.3)
LYMPHOCYTES NFR BLD AUTO: 16 %
MCH RBC QN AUTO: 32.2 PG (ref 26.5–33)
MCHC RBC AUTO-ENTMCNC: 33.8 G/DL (ref 31.5–36.5)
MCV RBC AUTO: 95 FL (ref 78–100)
MICROALBUMIN UR-MCNC: <12 MG/L
MICROALBUMIN/CREAT UR: NORMAL MG/G{CREAT}
MONOCYTES # BLD AUTO: 0.9 10E3/UL (ref 0–1.3)
MONOCYTES NFR BLD AUTO: 11 %
NEUTROPHILS # BLD AUTO: 5.7 10E3/UL (ref 1.6–8.3)
NEUTROPHILS NFR BLD AUTO: 70 %
PLATELET # BLD AUTO: 231 10E3/UL (ref 150–450)
POTASSIUM SERPL-SCNC: 4.1 MMOL/L (ref 3.4–5.3)
PROT SERPL-MCNC: 6.6 G/DL (ref 6.4–8.3)
RBC # BLD AUTO: 4.35 10E6/UL (ref 4.4–5.9)
SODIUM SERPL-SCNC: 140 MMOL/L (ref 135–145)
WBC # BLD AUTO: 8.2 10E3/UL (ref 4–11)

## 2025-07-09 PROCEDURE — 85025 COMPLETE CBC W/AUTO DIFF WBC: CPT | Performed by: INTERNAL MEDICINE

## 2025-07-09 PROCEDURE — 3074F SYST BP LT 130 MM HG: CPT | Performed by: INTERNAL MEDICINE

## 2025-07-09 PROCEDURE — 82570 ASSAY OF URINE CREATININE: CPT | Performed by: INTERNAL MEDICINE

## 2025-07-09 PROCEDURE — 80053 COMPREHEN METABOLIC PANEL: CPT | Performed by: INTERNAL MEDICINE

## 2025-07-09 PROCEDURE — 99214 OFFICE O/P EST MOD 30 MIN: CPT | Performed by: INTERNAL MEDICINE

## 2025-07-09 PROCEDURE — 82043 UR ALBUMIN QUANTITATIVE: CPT | Performed by: INTERNAL MEDICINE

## 2025-07-09 PROCEDURE — 3078F DIAST BP <80 MM HG: CPT | Performed by: INTERNAL MEDICINE

## 2025-07-09 PROCEDURE — 93005 ELECTROCARDIOGRAM TRACING: CPT | Performed by: INTERNAL MEDICINE

## 2025-07-09 PROCEDURE — 36415 COLL VENOUS BLD VENIPUNCTURE: CPT | Performed by: INTERNAL MEDICINE

## 2025-07-09 PROCEDURE — 93010 ELECTROCARDIOGRAM REPORT: CPT | Performed by: STUDENT IN AN ORGANIZED HEALTH CARE EDUCATION/TRAINING PROGRAM

## 2025-07-09 PROCEDURE — G2211 COMPLEX E/M VISIT ADD ON: HCPCS | Performed by: INTERNAL MEDICINE

## 2025-07-09 NOTE — PATIENT INSTRUCTIONS
Hold the gabapentin while taking the pain medication and use caution if taking together.     Hold ibuprofen and aspirin for 7 days before the procedure.    Hold the multivitamin and fish oil for a week before.     Labs today.     Let me know if issues come up.    Myke Yates MD      Patient Education   Preparing for Your Surgery  For Adults  Getting started  In most cases, a nurse will call to review your health history and instructions. They will give you an arrival time based on your scheduled surgery time. Please be ready to share:  Your doctor's clinic name and phone number  Your medical, surgical, and anesthesia history  A list of allergies and sensitivities  A list of medicines, including herbal treatments and over-the-counter drugs  Whether the patient has a legal guardian (ask how to send us the papers in advance)  Note: You may not receive a call if you were seen at our PAC (Preoperative Assessment Center).  Please tell us if you're pregnant--or if there's any chance you might be pregnant. Some surgeries may injure a fetus (unborn baby), so they require a pregnancy test. Surgeries that are safe for a fetus don't always need a test, and you can choose whether to have one.   Preparing for surgery  Within 10 to 30 days of surgery: Have a pre-op exam (sometimes called an H&P, or History and Physical). This can be done at a clinic or pre-operative center.  If you're having a , you may not need this exam. Talk to your care team.  At your pre-op exam, talk to your care team about all medicines you take. (This includes CBD oil and any drugs, such as THC, marijuana, and other forms of cannabis.) If you need to stop any medicine before surgery, ask when to start taking it again.  This is for your safety. Many medicines and drugs can make you bleed too much during surgery. Some change how well surgery (anesthesia) drugs work.  Call your insurance company to let them know you're having surgery. (If you don't  have insurance, call [phone].)  Call your clinic if there's any change in your health. This includes a scrape or scratch near the surgery site, or any signs of a cold (sore throat, runny nose, cough, rash, fever).  Eating and drinking guidelines  For your safety: Unless your surgeon tells you otherwise, follow the guidelines below.  Eat and drink as normal until 8 hours before you arrive for surgery. After that, no food or milk. You can spit out gum when you arrive.  Drink clear liquids until 2 hours before you arrive. These are liquids you can see through, like water, Gatorade, and Propel Water. They also include plain black coffee and tea (no cream or milk).  No alcohol for 24 hours before you arrive. The night before surgery, stop any drinks that contain THC.  If your care team tells you to take medicine on the morning of surgery, it's okay to take it with a sip of water. No other medicines or drugs are allowed (including CBD oil)--follow your care team's instructions.  If you have questions the day of surgery, call your hospital or surgery center.   Preventing infection  Shower or bathe the night before and the morning of surgery. Follow the instructions your clinic gave you. (If no instructions, use regular soap.)  Don't shave or clip hair near your surgery site. We'll remove the hair if needed.  Don't smoke or vape the morning of surgery. No chewing tobacco for 6 hours before you arrive. A nicotine patch is okay. You may spit out nicotine gum when you arrive.  For some surgeries, the surgeon will tell you to fully quit smoking and nicotine.  We will make every effort to keep you safe from infection. We will:  Clean our hands often with soap and water (or an alcohol-based hand rub).  Clean the skin at your surgery site with a special soap that kills germs.  Give you a special gown to keep you warm. (Cold raises the risk of infection.)  Wear hair covers, masks, gowns, and gloves during surgery.  Give  antibiotic medicine, if prescribed. Not all surgeries need this medicine.  What to bring on the day of surgery  Photo ID and insurance card  Copy of your health care directive, if you have one  Glasses and hearing aids (bring cases)  You can't wear contacts during surgery  Inhaler and eye drops, if you use them (tell us about these when you arrive)  CPAP machine or breathing device, if you use them  A few personal items, if spending the night  If you have . . .  A pacemaker, ICD (cardiac defibrillator), or other implant: Bring the ID card.  An implanted stimulator: Bring the remote control.  A legal guardian: Bring a copy of the certified (court-stamped) guardianship papers.  Please remove any jewelry, including body piercings. Leave jewelry and other valuables at home.  If you're going home the day of surgery  You must have a support person drive you home. They should stay with you overnight, and they may need to help with your self-care.  If you don't have a support person, please tells us as soon as possible. We can help.  After surgery  If it's hard to control your pain or you need more pain medicine, please call your surgeon's office.  Questions?   If you have any questions for your care team, list them here:   ____________________________________________________________________________________________________________________________________________________________________________________________________________________________________________________________  For informational purposes only. Not to replace the advice of your health care provider. Copyright   2003, 2019 [facility] [employer]. All rights reserved. Clinically reviewed by Austin Pacheco MD. [employer] [id number] - REV 02/25.

## 2025-07-09 NOTE — H&P (VIEW-ONLY)
Preoperative Evaluation  [facility]  [address] [facility] [address]  Phone: [phone]  Fax: [phone]  Primary Provider: Myke Yates MD  Pre-op Performing Provider: Myke Yates MD  Jul 9, 2025                No data to display              Fax number for surgical facility: Note does not need to be faxed, will be available electronically in Epic.    Assessment & Plan Preoperative Evaluation  [facility]  [address] [facility] [address]  Phone: [phone]  Fax: [phone]  Primary Provider: Myke Yates MD  Pre-op Performing Provider: Myke Yates MD  Jul 9, 2025 7/9/2025   Surgical Information   What procedure is being done? knee replacement    Facility or Hospital where procedure/surgery will be performed: [facility]    Who is doing the procedure / surgery? Dr. Almonte    Date of surgery / procedure: tbd    Time of surgery / procedure: tbd    Where do you plan to recover after surgery? at home with family        Proxy-reported     Fax number for surgical facility: Note does not need to be faxed, will be available electronically in Epic.        The proposed surgical procedure is considered INTERMEDIATE risk.    Preop general physical exam  Patient appears medically optimized for surgical procedure.  Of note he did have wheezing in the past with respiratory infections may need albuterol if needed.  No current signs or symptoms of shortness of breath.  Did take a fallhe earlier this week off his mower, no signs of ongoing trauma, lungs are clear without signs of chest pain or trauma.  - EKG 12-lead, tracing only  - Comprehensive metabolic panel (BMP + Alb, Alk Phos, ALT, AST, Total. Bili, TP); Future  - CBC with platelets and differential; Future  - Comprehensive metabolic panel (BMP + Alb, Alk Phos, ALT, AST, Total. Bili, TP)  - CBC with platelets and differential    Stage 3a  chronic kidney disease (H)  Will recheck renal function today.  Avoid nephrotoxic agents if possible.  He has tolerated low-dose ibuprofen in the past.  Will try to avoid them if possible.  - Albumin Random Urine Quantitative with Creat Ratio; Future  - Comprehensive metabolic panel (BMP + Alb, Alk Phos, ALT, AST, Total. Bili, TP); Future  - CBC with platelets and differential; Future  - Albumin Random Urine Quantitative with Creat Ratio  - Comprehensive metabolic panel (BMP + Alb, Alk Phos, ALT, AST, Total. Bili, TP)  - CBC with platelets and differential    Malignant tumor of prostate (H)  Stable following with urology with monitoring of labs.  If catheter is needed may need smaller diameter catheter for this.    Chronic pain of right knee  Having knee replacement for this    History of acute bronchitis with bronchospasm  As noted use albuterol if needed.  No need for ongoing therapy right now.    Other specified hypothyroidism  Patient will continue his [place] Thyroid.            - No identified additional risk factors other than previously addressed        Recommendation  Approval given to proceed with proposed procedure, without further diagnostic evaluation.        Subjective   Wilber is a 80 year old, presenting for the following:  Pre-Op Exam (7.18.25 -right- knee replacement surgery Dr. Erika Sterling)          7/9/2025     8:42 AM   Additional Questions   Roomed by mohsen   Accompanied by wife         7/9/2025   Forms   Any forms needing to be completed Yes     HPI:  Wilber Hanson, 80 years    Recent musculoskeletal trauma (lawnmower accident)  - On July 4, 2025, while cutting grass on a hill, the lawnmower tipped over onto him  - Reports soreness in the back and kidney area on one side, attributed to being pushed to the ground by the lawnmower  - No reported trauma to the chest  - No bruising on the shoulder  - No blood in the urine  - Third day after the incident was the most difficult in terms of symptoms,  "feeling judi rnow  - Reports back pain in the mornings, requiring kneeling to get up from bed  - no chest trauma, pain or issues with breathing.      - Felt that higher dose of gabapentin gabapentin caused cognitive side effects (feeling \"fuzzy\" and groggy, difficulty functioning)  - Last use of gabapentin was about a week prior to the encounter; previously used 100 mg as needed, not daily    Respiratory history  - Used albuterol in the past, not currently using it  - Past use of albuterol was for respiratory symptoms triggered by cold air in winter, described as airway tightening  - No current need for albuterol  - No current chest pain, chest pressure, or breathing difficulties reported since the TRELYS incident    Medication and supplement management  - Currently takes  fish oil, multivitamin, tamsulosin (Flomax), turmeric, and [place] Thyroid  - Plans to stop fish oil and multivitamin due to vitamin E content  - Expressed concern about tamsulosin after seeing a news report about liver failure  - Reports allergy to Tylenol (acetaminophen)  - No current use of ibuprofen or aspirin  - Reports that tamsulosin helps with urination, especially after surgery  - Reports constipation as a side effect of pain medications in the past    Urinary symptoms  - Reports difficulty with catheterization during previous procedures; small catheter caused pain and blood upon removal  - No current blood in urine    Past medical history relevant to current condition  - 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 No    Have you ever had surgery on your heart or blood vessels, such as a stent placement, a coronary artery bypass, or surgery on an artery in your head, neck, heart, or legs? No    Do you have chest pain with activity? No    Do you have a history of heart failure? No    Do you currently have a cold, bronchitis or symptoms of other infection? No    Do you have a cough, shortness of breath, or wheezing? No    Do you or anyone in your family have previous history of blood clots? No    Do you or does anyone in your family have a serious bleeding problem such as prolonged bleeding following surgeries or cuts? No    Have you ever had problems with anemia or been told to take iron pills? No    Have you had any abnormal blood loss such as black, tarry or bloody stools? No    Have you ever had a blood transfusion? No    Are you willing to have a blood transfusion if it is medically needed before, during, or after your surgery? Yes    Have you or any of your relatives ever had problems with anesthesia? No    Do you have sleep apnea, excessive snoring or daytime drowsiness? No    Do you have any artifical heart valves or other implanted medical devices like a pacemaker, defibrillator, or continuous glucose monitor? No    Do you have artificial joints? No    Are you allergic to latex? No       Advance Care Planning    Discussed advance care planning with patient; informed AVS has link to [employer].    Preoperative Review of    reviewed - no record of controlled substances prescribed.          Patient Active Problem List    Diagnosis Date Noted    Chronic pain of right knee 03/11/2025     Priority: Medium    Nocturia associated with benign prostatic hyperplasia 12/26/2024     Priority: Medium    Chronic gout without tophus, unspecified cause, unspecified site 12/26/2024     Priority: Medium    Stage 3a chronic kidney disease (H) 11/27/2023     Priority: Medium    Other  specified hypothyroidism 11/24/2023     Priority: Medium    Malignant tumor of prostate (H) 11/08/2023     Priority: Medium    Left hip pain 04/01/2022     Priority: Medium    Lumbar radiculopathy, chronic      Priority: Medium     Created by Conversion      Formatting of this note might be different from the original.  Created by Conversion      Sinusitis      Priority: Medium     Created by Conversion  Replacement Utility updated for latest IMO load      Formatting of this note might be different from the original.  Created by Conversion    Replacement Utility updated for latest IMO load      Otitis Externa      Priority: Medium     Created by Conversion  Replacement Utility updated for latest IMO load      Formatting of this note might be different from the original.  Created by Conversion    Replacement Utility updated for latest IMO load      Tendonitis      Priority: Medium     Created by Conversion  Replacement Utility updated for latest IMO load        Scoliosis of lumbar region due to degenerative disease of spine in adult 09/01/2015     Priority: Medium     Created by Conversion  Replacement Utility updated for latest IMO load      Formatting of this note might be different from the original.  Created by Conversion    Replacement Utility updated for latest IMO load      Tendonitis 09/01/2015     Priority: Medium     Formatting of this note might be different from the original.  Created by Conversion    Replacement Utility updated for latest IMO load      Tinea pedis      Priority: Medium     Created by Conversion      Formatting of this note might be different from the original.  Created by Conversion      Acute gout      Priority: Medium     Created by Conversion      Formatting of this note might be different from the original.  Created by Conversion      Bronchitis      Priority: Medium     Created by Conversion      Formatting of this note might be different from the original.  Created by Conversion       Hyperlipidemia      Priority: Medium     Created by Conversion      Formatting of this note might be different from the original.  Created by Conversion      Cervical Spondylosis (C5 - C6)      Priority: Medium     Created by Conversion  [facility] Annotation: Feb  3 2010 11:Patel Willson: 2004      Formatting of this note might be different from the original.  Created by Conversion  [facility] Annotation: Feb  3 2010 11:Patel Willson: 2004      Onychomycosis Of The Toenails      Priority: Medium     Created by Conversion      Formatting of this note might be different from the original.  Created by Conversion      Surgical follow-up care 03/19/2014     Priority: Medium    Impingement syndrome of left shoulder 02/21/2014     Priority: Medium    Rotator cuff injury 09/20/2013     Priority: Medium    Shoulder pain 09/20/2013     Priority: Medium      Past Medical History:   Diagnosis Date    Acute gouty arthropathy     Created by Conversion     Bronchitis, not specified as acute or chronic     Created by Conversion     Congestive heart failure (H)     Dermatophytosis of foot     Created by Conversion     Enthesopathy     Created by Conversion  Replacement Utility updated for latest IMO load    Infective otitis externa     Created by Conversion  Replacement Utility updated for latest IMO load    Le's neuroma     Other and unspecified hyperlipidemia     Created by Conversion     Renal disease     Sleep apnea     Thoracic or lumbosacral neuritis or radiculitis, unspecified     Created by Conversion     Thyroid disease      Past Surgical History:   Procedure Laterality Date    ARTHROSCOPY SHOULDER ROTATOR CUFF REPAIR Right     LASIK       Current Outpatient Medications   Medication Sig Dispense Refill    allopurinol (ZYLOPRIM) 100 MG tablet Take 1 tablet (100 mg) by mouth daily. 90 tablet 3    GLUCOSAM HCL/CHONDRO FRIEDMAN A/C/MN (GLUCOSAMINE-CHONDROITIN COMPLX ORAL) [GLUCOSAM HCL/CHONDRO FRIEDMAN  "A/C/MN (GLUCOSAMINE-CHONDROITIN COMPLX ORAL)] Take 1 tablet daily      MELATONIN ORAL [MELATONIN ORAL] Take by mouth. As directed      Multiple Vitamin (MULTI VITAMIN) TABS Take 1 tablet by mouth daily      Psyllium 33 % POWD Take 6 g by mouth daily  0    SAW PALMETTO ORAL [SAW PALMETTO ORAL] Take by mouth.      sildenafil (REVATIO) 20 MG tablet Take 1-5 tablets ( mg) by mouth daily as needed (erectile dysfunction). 30 tablet 3    thyroid (ARMOUR) 30 MG tablet Take 0.5 tablets (15 mg) by mouth daily. 90 tablet 3    gabapentin (NEURONTIN) 100 MG capsule Take 1 capsule (100 mg) by mouth 2 times daily. (Patient taking differently: Take 100 mg by mouth daily.) 180 capsule 3    tamsulosin (FLOMAX) 0.4 MG capsule Take 1 capsule (0.4 mg) by mouth daily. 90 capsule 3    zinc 50 mg Tab [ZINC 50 MG TAB] Take as directed         Allergies   Allergen Reactions    Penicillins Unknown    Sulfa (Sulfonamide Antibiotics) [Sulfa Antibiotics] Rash    Tylenol [Acetaminophen] Other (See Comments)     Irregular heartrate        Social History     Tobacco Use    Smoking status: Never     Passive exposure: Never    Smokeless tobacco: Never   Substance Use Topics    Alcohol use: Yes     Alcohol/week: 14.0 standard drinks of alcohol       History   Drug Use No               Objective    /70   Pulse 79   Temp 98.6  F (37  C) (Oral)   Resp 16   Ht 1.676 m (5' 6\")   Wt 75.6 kg (166 lb 9.6 oz)   SpO2 98%   BMI 26.89 kg/m     Estimated body mass index is 26.89 kg/m  as calculated from the following:    Height as of this encounter: 1.676 m (5' 6\").    Weight as of this encounter: 75.6 kg (166 lb 9.6 oz).  Physical Exam  GENERAL: alert and no distress  EYES: Eyes grossly normal to inspection, PERRL and conjunctivae and sclerae normal  HENT: MMM without lesions  NECK: no adenopathy, no asymmetry, masses, or scars  RESP: lungs clear to auscultation - no rales, rhonchi or wheezes  CV: regular rate and rhythm, normal S1 S2, no S3 or " S4, no murmur, click or rub, no peripheral edema  ABDOMEN: soft, nontender, no hepatosplenomegaly, no masses and bowel sounds normal  MS: no gross musculoskeletal defects noted, no edema  NEURO: Normal strength and tone, mentation intact and speech normal  PSYCH: mentation appears normal, affect normal/bright    Recent Labs   Lab Test 12/26/24  1203 12/14/24  2200   HGB  --  14.2   PLT  --  234    141   POTASSIUM 4.3 4.4   CR 1.17 1.22*   A1C 5.4  --         Diagnostics  Recent Results (from the past 24 hours)   EKG 12-lead, tracing only    Collection Time: 07/09/25  9:01 AM   Result Value Ref Range    Systolic Blood Pressure  mmHg    Diastolic Blood Pressure  mmHg    Ventricular Rate 60 BPM    Atrial Rate 60 BPM    MS Interval 188 ms    QRS Duration 84 ms     ms    QTc 392 ms    P Axis 28 degrees    R AXIS -23 degrees    T Axis -18 degrees    Interpretation ECG       Sinus rhythm  Normal ECG  When compared with ECG of 14-Dec-2024 21:48,  No significant change was found     CBC with platelets and differential    Collection Time: 07/09/25  9:34 AM   Result Value Ref Range    WBC Count 8.2 4.0 - 11.0 10e3/uL    RBC Count 4.35 (L) 4.40 - 5.90 10e6/uL    Hemoglobin 14.0 13.3 - 17.7 g/dL    Hematocrit 41.4 40.0 - 53.0 %    MCV 95 78 - 100 fL    MCH 32.2 26.5 - 33.0 pg    MCHC 33.8 31.5 - 36.5 g/dL    RDW 13.1 10.0 - 15.0 %    Platelet Count 231 150 - 450 10e3/uL    % Neutrophils 70 %    % Lymphocytes 16 %    % Monocytes 11 %    % Eosinophils 3 %    % Basophils 0 %    % Immature Granulocytes 0 %    Absolute Neutrophils 5.7 1.6 - 8.3 10e3/uL    Absolute Lymphocytes 1.3 0.8 - 5.3 10e3/uL    Absolute Monocytes 0.9 0.0 - 1.3 10e3/uL    Absolute Eosinophils 0.2 0.0 - 0.7 10e3/uL    Absolute Basophils 0.0 0.0 - 0.2 10e3/uL    Absolute Immature Granulocytes 0.0 <=0.4 10e3/uL      EKG: appears normal, NSR, normal axis, normal intervals, no acute ST/T changes c/w ischemia, no LVH by voltage criteria, unchanged from  previous tracings    Revised Cardiac Risk Index (RCRI)  The patient has the following serious cardiovascular risks for perioperative complications:   - No serious cardiac risks = 0 points     RCRI Interpretation: 0 points: Class I (very low risk - 0.4% complication rate)         The longitudinal plan of care for the diagnosis(es)/condition(s) as documented were addressed during this visit. Due to the added complexity in care, I will continue to support Herb in the subsequent management and with ongoing continuity of care.      Signed Electronically by: Myke Yates MD  A copy of this evaluation report is provided to the requesting physician.         Answers submitted by the patient for this visit:  General Questionnaire (Submitted on 7/9/2025)  Chief Complaint: Chronic problems general questions HPI Form  What is the reason for your visit today? : pre op physical  How many days per week do you miss taking your medication?: 0  Questionnaire about: Chronic problems general questions HPI Form (Submitted on 7/9/2025)  Chief Complaint: Chronic problems general questions HPI Form

## 2025-07-09 NOTE — PROGRESS NOTES
Preoperative Evaluation  [facility]  [address]] [address]  Phone: [phone]  Fax: [phone]  Primary Provider: Myke Yates MD  Pre-op Performing Provider: Myke Yates MD  Jul 9, 2025                No data to display              Fax number for surgical facility: Note does not need to be faxed, will be available electronically in Epic.    Assessment & Plan Preoperative Evaluation  [facility]  [address]] [address]  Phone: [phone]  Fax: [phone]  Primary Provider: Myke Yates MD  Pre-op Performing Provider: Myke Yates MD  Jul 9, 2025 7/9/2025   Surgical Information   What procedure is being done? knee replacement    Facility or Hospital where procedure/surgery will be performed: [facility]    Who is doing the procedure / surgery? Dr. Almonte    Date of surgery / procedure: tbd    Time of surgery / procedure: tbd    Where do you plan to recover after surgery? at home with family        Proxy-reported     Fax number for surgical facility: Note does not need to be faxed, will be available electronically in Epic.        The proposed surgical procedure is considered INTERMEDIATE risk.    Preop general physical exam  Patient appears medically optimized for surgical procedure.  Of note he did have wheezing in the past with respiratory infections may need albuterol if needed.  No current signs or symptoms of shortness of breath.  Did take a fallhe earlier this week off his mower, no signs of ongoing trauma, lungs are clear without signs of chest pain or trauma.  - EKG 12-lead, tracing only  - Comprehensive metabolic panel (BMP + Alb, Alk Phos, ALT, AST, Total. Bili, TP); Future  - CBC with platelets and differential; Future  - Comprehensive metabolic panel (BMP + Alb, Alk Phos, ALT, AST, Total. Bili, TP)  - CBC with platelets and differential    Stage 3a  chronic kidney disease (H)  Will recheck renal function today.  Avoid nephrotoxic agents if possible.  He has tolerated low-dose ibuprofen in the past.  Will try to avoid them if possible.  - Albumin Random Urine Quantitative with Creat Ratio; Future  - Comprehensive metabolic panel (BMP + Alb, Alk Phos, ALT, AST, Total. Bili, TP); Future  - CBC with platelets and differential; Future  - Albumin Random Urine Quantitative with Creat Ratio  - Comprehensive metabolic panel (BMP + Alb, Alk Phos, ALT, AST, Total. Bili, TP)  - CBC with platelets and differential    Malignant tumor of prostate (H)  Stable following with urology with monitoring of labs.  If catheter is needed may need smaller diameter catheter for this.    Chronic pain of right knee  Having knee replacement for this    History of acute bronchitis with bronchospasm  As noted use albuterol if needed.  No need for ongoing therapy right now.    Other specified hypothyroidism  Patient will continue his [place] Thyroid.            - No identified additional risk factors other than previously addressed        Recommendation  Approval given to proceed with proposed procedure, without further diagnostic evaluation.        Subjective   Wilber is a 80 year old, presenting for the following:  Pre-Op Exam (7.18.25 -right- knee replacement surgery Dr. Erika Sterling)          7/9/2025     8:42 AM   Additional Questions   Roomed by mohsen   Accompanied by wife         7/9/2025   Forms   Any forms needing to be completed Yes     HPI:  Wilber Hanson, 80 years    Recent musculoskeletal trauma (lawnmower accident)  - On July 4, 2025, while cutting grass on a hill, the lawnmower tipped over onto him  - Reports soreness in the back and kidney area on one side, attributed to being pushed to the ground by the lawnmower  - No reported trauma to the chest  - No bruising on the shoulder  - No blood in the urine  - Third day after the incident was the most difficult in terms of symptoms,  "feeling judi rnow  - Reports back pain in the mornings, requiring kneeling to get up from bed  - no chest trauma, pain or issues with breathing.      - Felt that higher dose of gabapentin gabapentin caused cognitive side effects (feeling \"fuzzy\" and groggy, difficulty functioning)  - Last use of gabapentin was about a week prior to the encounter; previously used 100 mg as needed, not daily    Respiratory history  - Used albuterol in the past, not currently using it  - Past use of albuterol was for respiratory symptoms triggered by cold air in winter, described as airway tightening  - No current need for albuterol  - No current chest pain, chest pressure, or breathing difficulties reported since the CATASYS incident    Medication and supplement management  - Currently takes  fish oil, multivitamin, tamsulosin (Flomax), turmeric, and [place] Thyroid  - Plans to stop fish oil and multivitamin due to vitamin E content  - Expressed concern about tamsulosin after seeing a news report about liver failure  - Reports allergy to Tylenol (acetaminophen)  - No current use of ibuprofen or aspirin  - Reports that tamsulosin helps with urination, especially after surgery  - Reports constipation as a side effect of pain medications in the past    Urinary symptoms  - Reports difficulty with catheterization during previous procedures; small catheter caused pain and blood upon removal  - No current blood in urine    Past medical history relevant to current condition  - 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 No    Have you ever had surgery on your heart or blood vessels, such as a stent placement, a coronary artery bypass, or surgery on an artery in your head, neck, heart, or legs? No    Do you have chest pain with activity? No    Do you have a history of heart failure? No    Do you currently have a cold, bronchitis or symptoms of other infection? No    Do you have a cough, shortness of breath, or wheezing? No    Do you or anyone in your family have previous history of blood clots? No    Do you or does anyone in your family have a serious bleeding problem such as prolonged bleeding following surgeries or cuts? No    Have you ever had problems with anemia or been told to take iron pills? No    Have you had any abnormal blood loss such as black, tarry or bloody stools? No    Have you ever had a blood transfusion? No    Are you willing to have a blood transfusion if it is medically needed before, during, or after your surgery? Yes    Have you or any of your relatives ever had problems with anesthesia? No    Do you have sleep apnea, excessive snoring or daytime drowsiness? No    Do you have any artifical heart valves or other implanted medical devices like a pacemaker, defibrillator, or continuous glucose monitor? No    Do you have artificial joints? No    Are you allergic to latex? No       Advance Care Planning    Discussed advance care planning with patient; informed AVS has link to [facility] [employer].    Preoperative Review of    reviewed - no record of controlled substances prescribed.          Patient Active Problem List    Diagnosis Date Noted    Chronic pain of right knee 03/11/2025     Priority: Medium    Nocturia associated with benign prostatic hyperplasia 12/26/2024     Priority: Medium    Chronic gout without tophus, unspecified cause, unspecified site 12/26/2024     Priority: Medium    Stage 3a chronic kidney disease (H) 11/27/2023     Priority: Medium    Other  specified hypothyroidism 11/24/2023     Priority: Medium    Malignant tumor of prostate (H) 11/08/2023     Priority: Medium    Left hip pain 04/01/2022     Priority: Medium    Lumbar radiculopathy, chronic      Priority: Medium     Created by Conversion      Formatting of this note might be different from the original.  Created by Conversion      Sinusitis      Priority: Medium     Created by Conversion  Replacement Utility updated for latest IMO load      Formatting of this note might be different from the original.  Created by Conversion    Replacement Utility updated for latest IMO load      Otitis Externa      Priority: Medium     Created by Conversion  Replacement Utility updated for latest IMO load      Formatting of this note might be different from the original.  Created by Conversion    Replacement Utility updated for latest IMO load      Tendonitis      Priority: Medium     Created by Conversion  Replacement Utility updated for latest IMO load        Scoliosis of lumbar region due to degenerative disease of spine in adult 09/01/2015     Priority: Medium     Created by Conversion  Replacement Utility updated for latest IMO load      Formatting of this note might be different from the original.  Created by Conversion    Replacement Utility updated for latest IMO load      Tendonitis 09/01/2015     Priority: Medium     Formatting of this note might be different from the original.  Created by Conversion    Replacement Utility updated for latest IMO load      Tinea pedis      Priority: Medium     Created by Conversion      Formatting of this note might be different from the original.  Created by Conversion      Acute gout      Priority: Medium     Created by Conversion      Formatting of this note might be different from the original.  Created by Conversion      Bronchitis      Priority: Medium     Created by Conversion      Formatting of this note might be different from the original.  Created by Conversion       Hyperlipidemia      Priority: Medium     Created by Conversion      Formatting of this note might be different from the original.  Created by Conversion      Cervical Spondylosis (C5 - C6)      Priority: Medium     Created by Conversion  [facility] Annotation: Feb  3 2010 11:Patel Willson: 2004      Formatting of this note might be different from the original.  Created by Conversion  [facility] Annotation: Feb  3 2010 11:Patel Willson: 2004      Onychomycosis Of The Toenails      Priority: Medium     Created by Conversion      Formatting of this note might be different from the original.  Created by Conversion      Surgical follow-up care 03/19/2014     Priority: Medium    Impingement syndrome of left shoulder 02/21/2014     Priority: Medium    Rotator cuff injury 09/20/2013     Priority: Medium    Shoulder pain 09/20/2013     Priority: Medium      Past Medical History:   Diagnosis Date    Acute gouty arthropathy     Created by Conversion     Bronchitis, not specified as acute or chronic     Created by Conversion     Congestive heart failure (H)     Dermatophytosis of foot     Created by Conversion     Enthesopathy     Created by Conversion  Replacement Utility updated for latest IMO load    Infective otitis externa     Created by Conversion  Replacement Utility updated for latest IMO load    Le's neuroma     Other and unspecified hyperlipidemia     Created by Conversion     Renal disease     Sleep apnea     Thoracic or lumbosacral neuritis or radiculitis, unspecified     Created by Conversion     Thyroid disease      Past Surgical History:   Procedure Laterality Date    ARTHROSCOPY SHOULDER ROTATOR CUFF REPAIR Right     LASIK       Current Outpatient Medications   Medication Sig Dispense Refill    allopurinol (ZYLOPRIM) 100 MG tablet Take 1 tablet (100 mg) by mouth daily. 90 tablet 3    GLUCOSAM HCL/CHONDRO FRIEDMAN A/C/MN (GLUCOSAMINE-CHONDROITIN COMPLX ORAL) [GLUCOSAM HCL/CHONDRO FRIEDMAN  "A/C/MN (GLUCOSAMINE-CHONDROITIN COMPLX ORAL)] Take 1 tablet daily      MELATONIN ORAL [MELATONIN ORAL] Take by mouth. As directed      Multiple Vitamin (MULTI VITAMIN) TABS Take 1 tablet by mouth daily      Psyllium 33 % POWD Take 6 g by mouth daily  0    SAW PALMETTO ORAL [SAW PALMETTO ORAL] Take by mouth.      sildenafil (REVATIO) 20 MG tablet Take 1-5 tablets ( mg) by mouth daily as needed (erectile dysfunction). 30 tablet 3    thyroid (ARMOUR) 30 MG tablet Take 0.5 tablets (15 mg) by mouth daily. 90 tablet 3    gabapentin (NEURONTIN) 100 MG capsule Take 1 capsule (100 mg) by mouth 2 times daily. (Patient taking differently: Take 100 mg by mouth daily.) 180 capsule 3    tamsulosin (FLOMAX) 0.4 MG capsule Take 1 capsule (0.4 mg) by mouth daily. 90 capsule 3    zinc 50 mg Tab [ZINC 50 MG TAB] Take as directed         Allergies   Allergen Reactions    Penicillins Unknown    Sulfa (Sulfonamide Antibiotics) [Sulfa Antibiotics] Rash    Tylenol [Acetaminophen] Other (See Comments)     Irregular heartrate        Social History     Tobacco Use    Smoking status: Never     Passive exposure: Never    Smokeless tobacco: Never   Substance Use Topics    Alcohol use: Yes     Alcohol/week: 14.0 standard drinks of alcohol       History   Drug Use No               Objective    /70   Pulse 79   Temp 98.6  F (37  C) (Oral)   Resp 16   Ht 1.676 m (5' 6\")   Wt 75.6 kg (166 lb 9.6 oz)   SpO2 98%   BMI 26.89 kg/m     Estimated body mass index is 26.89 kg/m  as calculated from the following:    Height as of this encounter: 1.676 m (5' 6\").    Weight as of this encounter: 75.6 kg (166 lb 9.6 oz).  Physical Exam  GENERAL: alert and no distress  EYES: Eyes grossly normal to inspection, PERRL and conjunctivae and sclerae normal  HENT: MMM without lesions  NECK: no adenopathy, no asymmetry, masses, or scars  RESP: lungs clear to auscultation - no rales, rhonchi or wheezes  CV: regular rate and rhythm, normal S1 S2, no S3 or " S4, no murmur, click or rub, no peripheral edema  ABDOMEN: soft, nontender, no hepatosplenomegaly, no masses and bowel sounds normal  MS: no gross musculoskeletal defects noted, no edema  NEURO: Normal strength and tone, mentation intact and speech normal  PSYCH: mentation appears normal, affect normal/bright    Recent Labs   Lab Test 12/26/24  1203 12/14/24  2200   HGB  --  14.2   PLT  --  234    141   POTASSIUM 4.3 4.4   CR 1.17 1.22*   A1C 5.4  --         Diagnostics  Recent Results (from the past 24 hours)   EKG 12-lead, tracing only    Collection Time: 07/09/25  9:01 AM   Result Value Ref Range    Systolic Blood Pressure  mmHg    Diastolic Blood Pressure  mmHg    Ventricular Rate 60 BPM    Atrial Rate 60 BPM    AK Interval 188 ms    QRS Duration 84 ms     ms    QTc 392 ms    P Axis 28 degrees    R AXIS -23 degrees    T Axis -18 degrees    Interpretation ECG       Sinus rhythm  Normal ECG  When compared with ECG of 14-Dec-2024 21:48,  No significant change was found     CBC with platelets and differential    Collection Time: 07/09/25  9:34 AM   Result Value Ref Range    WBC Count 8.2 4.0 - 11.0 10e3/uL    RBC Count 4.35 (L) 4.40 - 5.90 10e6/uL    Hemoglobin 14.0 13.3 - 17.7 g/dL    Hematocrit 41.4 40.0 - 53.0 %    MCV 95 78 - 100 fL    MCH 32.2 26.5 - 33.0 pg    MCHC 33.8 31.5 - 36.5 g/dL    RDW 13.1 10.0 - 15.0 %    Platelet Count 231 150 - 450 10e3/uL    % Neutrophils 70 %    % Lymphocytes 16 %    % Monocytes 11 %    % Eosinophils 3 %    % Basophils 0 %    % Immature Granulocytes 0 %    Absolute Neutrophils 5.7 1.6 - 8.3 10e3/uL    Absolute Lymphocytes 1.3 0.8 - 5.3 10e3/uL    Absolute Monocytes 0.9 0.0 - 1.3 10e3/uL    Absolute Eosinophils 0.2 0.0 - 0.7 10e3/uL    Absolute Basophils 0.0 0.0 - 0.2 10e3/uL    Absolute Immature Granulocytes 0.0 <=0.4 10e3/uL      EKG: appears normal, NSR, normal axis, normal intervals, no acute ST/T changes c/w ischemia, no LVH by voltage criteria, unchanged from  previous tracings    Revised Cardiac Risk Index (RCRI)  The patient has the following serious cardiovascular risks for perioperative complications:   - No serious cardiac risks = 0 points     RCRI Interpretation: 0 points: Class I (very low risk - 0.4% complication rate)         The longitudinal plan of care for the diagnosis(es)/condition(s) as documented were addressed during this visit. Due to the added complexity in care, I will continue to support Herb in the subsequent management and with ongoing continuity of care.      Signed Electronically by: Myke Yates MD  A copy of this evaluation report is provided to the requesting physician.         Answers submitted by the patient for this visit:  General Questionnaire (Submitted on 7/9/2025)  Chief Complaint: Chronic problems general questions HPI Form  What is the reason for your visit today? : pre op physical  How many days per week do you miss taking your medication?: 0  Questionnaire about: Chronic problems general questions HPI Form (Submitted on 7/9/2025)  Chief Complaint: Chronic problems general questions HPI Form

## 2025-07-10 LAB
ATRIAL RATE - MUSE: 60 BPM
DIASTOLIC BLOOD PRESSURE - MUSE: NORMAL MMHG
INTERPRETATION ECG - MUSE: NORMAL
P AXIS - MUSE: 28 DEGREES
PR INTERVAL - MUSE: 188 MS
QRS DURATION - MUSE: 84 MS
QT - MUSE: 392 MS
QTC - MUSE: 392 MS
R AXIS - MUSE: -23 DEGREES
SYSTOLIC BLOOD PRESSURE - MUSE: NORMAL MMHG
T AXIS - MUSE: -18 DEGREES
VENTRICULAR RATE- MUSE: 60 BPM

## 2025-07-17 NOTE — TREATMENT PLAN
Orthopedic Surgery Pre-Op Plan: Rasheed Hanson  pre-op review. This is NOT an H&P   Surgeon: Dr. Almonte    [facility]: [facility]  Name of Surgery: Right Total Knee Arthroplasty   Date of Surgery: 7/18/25  H&P: Completed on 7/18/25 by Dr. Myke Yates at [facility].   History of ASA, NSAIDS, vitamin and/or herbal supplements, GLP-1 Agonist or SGLT Inhibitor medication taken within 10 days?: Yes: Takes saw palmetto, glucosamine-chondroitin, multivitamins: Patient was instructed to hold these supplements and vitamins for 7 days before surgery.  History of blood thinners?: No    Plan:   1) Discharge Plan: Home POD 1 with assist of wife, possibly with Home PT.  Please see Discharge Planning section near bottom of this note for further details.     2) History of Bronchitis with Bronchospasm: Per notes from PCP, appears this is not bothering him right now but he does use albuterol inhaler as needed when this occurs.     3) Hypothyroidism: On [place] thyroid.     4) Chronic Kidney Disease: Stage 3a: Stable.  Creatinine 1.18, GFR 62 on 7/9/2025. I recommend promoting good post-op hydration and monitoring post-op kidney function.      Patient appears medically optimized for upcoming surgery. I would recommend Hospitalist Consult to assist with medical management. Please call me below with any questions on this patient.       Review of Systems Notable for: History of bronchitis with bronchospasm-uses albuterol inhaler as needed, hypothyroidism, chronic kidney disease-stage 3a.     Past Medical History:   Past Medical History:   Diagnosis Date    Acute gouty arthropathy     Created by Conversion     Bronchitis, not specified as acute or chronic     Created by Conversion     Congestive heart failure (H)     Dermatophytosis of foot     Created by Conversion     Enthesopathy     Created by Conversion  Replacement Utility updated for latest IMO load    Infective otitis externa     Created by Conversion   Replacement Utility updated for latest IMO load    Le's neuroma     Other and unspecified hyperlipidemia     Created by Conversion     Renal disease     Sleep apnea     Thoracic or lumbosacral neuritis or radiculitis, unspecified     Created by Conversion     Thyroid disease      Past Surgical History:   Procedure Laterality Date    ARTHROSCOPY SHOULDER ROTATOR CUFF REPAIR Right     LASIK         Current Medications:  Patient's Medications   New Prescriptions    No medications on file   Previous Medications    ALLOPURINOL (ZYLOPRIM) 100 MG TABLET    Take 1 tablet (100 mg) by mouth daily.    GABAPENTIN (NEURONTIN) 100 MG CAPSULE    Take 1 capsule (100 mg) by mouth 2 times daily.    GLUCOSAM HCL/CHONDRO FRIEDMAN A/C/MN (GLUCOSAMINE-CHONDROITIN COMPLX ORAL)    [GLUCOSAM HCL/CHONDRO FRIEDMAN A/C/MN (GLUCOSAMINE-CHONDROITIN COMPLX ORAL)] Take 1 tablet daily    MELATONIN ORAL    [MELATONIN ORAL] Take by mouth. As directed    MULTIPLE VITAMIN (MULTI VITAMIN) TABS    Take 1 tablet by mouth daily    PSYLLIUM 33 % POWD    Take 6 g by mouth daily    SAW PALMETTO ORAL    [SAW PALMETTO ORAL] Take by mouth.    SILDENAFIL (REVATIO) 20 MG TABLET    Take 1-5 tablets ( mg) by mouth daily as needed (erectile dysfunction).    TAMSULOSIN (FLOMAX) 0.4 MG CAPSULE    Take 1 capsule (0.4 mg) by mouth daily.    THYROID (ARMOUR) 30 MG TABLET    Take 0.5 tablets (15 mg) by mouth daily.    ZINC 50 MG TAB    [ZINC 50 MG TAB] Take as directed   Modified Medications    No medications on file   Discontinued Medications    No medications on file       ALLERGIES:  Allergies   Allergen Reactions    Penicillins Unknown    Sulfa (Sulfonamide Antibiotics) [Sulfa Antibiotics] Rash    Tylenol [Acetaminophen] Other (See Comments)     Irregular heartrate       Social History  Social History     Tobacco Use    Smoking status: Never     Passive exposure: Never    Smokeless tobacco: Never   Vaping Use    Vaping status: Never Used   Substance Use Topics    Alcohol  use: Yes     Alcohol/week: 14.0 standard drinks of alcohol    Drug use: No       Any Abnormal Recent Diagnostics? Yes  Creatinine 1.18, GFR 62 on 7/9/2025: Shows stable chronic kidney disease-stage 3a.     Discharge Planning:     Home POD 1 with assist of wife, possibly with Home PT.      Patient called with some questions about his surgery. He said his wife didn't know if she could take care of him after surgery and he was asking about TCU. I explained to him that TCU is not usually covered after total knee replacement. He lives in a condo with an elevator. Explained that he should be walking independently with a walker after surgery and can put full weight on his knee. We discussed the option of home care PT when he goes home, if that is needed. He plans to go home with his wife on POD 1 with possible home care PT.      FERDINAND Thurman, CNP   Advanced Practice Nurse Navigator- Orthopedics  [facility]   Phone: [phone]

## 2025-07-18 ENCOUNTER — HOSPITAL ENCOUNTER (OUTPATIENT)
Facility: CLINIC | Age: 80
Discharge: HOME | End: 2025-07-20
Attending: ORTHOPAEDIC SURGERY | Admitting: ORTHOPAEDIC SURGERY
Payer: COMMERCIAL

## 2025-07-18 ENCOUNTER — APPOINTMENT (OUTPATIENT)
Dept: RADIOLOGY | Facility: CLINIC | Age: 80
End: 2025-07-18
Attending: ORTHOPAEDIC SURGERY
Payer: COMMERCIAL

## 2025-07-18 ENCOUNTER — APPOINTMENT (OUTPATIENT)
Dept: PHYSICAL THERAPY | Facility: CLINIC | Age: 80
End: 2025-07-18
Attending: ORTHOPAEDIC SURGERY
Payer: COMMERCIAL

## 2025-07-18 DIAGNOSIS — Z96.60 STATUS POST JOINT REPLACEMENT: Primary | ICD-10-CM

## 2025-07-18 PROCEDURE — 999N000141 HC STATISTIC PRE-PROCEDURE NURSING ASSESSMENT: Performed by: ORTHOPAEDIC SURGERY

## 2025-07-18 PROCEDURE — 99223 1ST HOSP IP/OBS HIGH 75: CPT | Mod: AI | Performed by: STUDENT IN AN ORGANIZED HEALTH CARE EDUCATION/TRAINING PROGRAM

## 2025-07-18 PROCEDURE — 97110 THERAPEUTIC EXERCISES: CPT | Mod: GP

## 2025-07-18 PROCEDURE — 250N000013 HC RX MED GY IP 250 OP 250 PS 637: Performed by: PHYSICIAN ASSISTANT

## 2025-07-18 PROCEDURE — 97161 PT EVAL LOW COMPLEX 20 MIN: CPT | Mod: GP

## 2025-07-18 PROCEDURE — 258N000003 HC RX IP 258 OP 636: Performed by: ORTHOPAEDIC SURGERY

## 2025-07-18 PROCEDURE — 999N000065 XR KNEE PORT RIGHT 1/2 VIEWS: Mod: RT

## 2025-07-18 PROCEDURE — 370N000017 HC ANESTHESIA TECHNICAL FEE, PER MIN: Performed by: ORTHOPAEDIC SURGERY

## 2025-07-18 PROCEDURE — C1713 ANCHOR/SCREW BN/BN,TIS/BN: HCPCS | Performed by: ORTHOPAEDIC SURGERY

## 2025-07-18 PROCEDURE — 710N000010 HC RECOVERY PHASE 1, LEVEL 2, PER MIN: Performed by: ORTHOPAEDIC SURGERY

## 2025-07-18 PROCEDURE — 250N000011 HC RX IP 250 OP 636: Performed by: ORTHOPAEDIC SURGERY

## 2025-07-18 PROCEDURE — 360N000077 HC SURGERY LEVEL 4, PER MIN: Performed by: ORTHOPAEDIC SURGERY

## 2025-07-18 PROCEDURE — 250N000013 HC RX MED GY IP 250 OP 250 PS 637: Performed by: FAMILY MEDICINE

## 2025-07-18 PROCEDURE — 258N000003 HC RX IP 258 OP 636: Performed by: PHYSICIAN ASSISTANT

## 2025-07-18 PROCEDURE — 250N000013 HC RX MED GY IP 250 OP 250 PS 637: Performed by: ORTHOPAEDIC SURGERY

## 2025-07-18 PROCEDURE — 250N000013 HC RX MED GY IP 250 OP 250 PS 637: Performed by: ANESTHESIOLOGY

## 2025-07-18 PROCEDURE — 250N000013 HC RX MED GY IP 250 OP 250 PS 637: Performed by: STUDENT IN AN ORGANIZED HEALTH CARE EDUCATION/TRAINING PROGRAM

## 2025-07-18 PROCEDURE — 250N000011 HC RX IP 250 OP 636: Performed by: ANESTHESIOLOGY

## 2025-07-18 PROCEDURE — 272N000001 HC OR GENERAL SUPPLY STERILE: Performed by: ORTHOPAEDIC SURGERY

## 2025-07-18 PROCEDURE — C1776 JOINT DEVICE (IMPLANTABLE): HCPCS | Performed by: ORTHOPAEDIC SURGERY

## 2025-07-18 PROCEDURE — 250N000011 HC RX IP 250 OP 636: Performed by: PHYSICIAN ASSISTANT

## 2025-07-18 PROCEDURE — 258N000003 HC RX IP 258 OP 636: Performed by: ANESTHESIOLOGY

## 2025-07-18 PROCEDURE — 258N000001 HC RX 258: Performed by: ORTHOPAEDIC SURGERY

## 2025-07-18 DEVICE — PATELLA
Type: IMPLANTABLE DEVICE | Site: KNEE | Status: FUNCTIONAL
Brand: TRIATHLON

## 2025-07-18 DEVICE — PRIMARY TIBIAL BASEPLATE
Type: IMPLANTABLE DEVICE | Site: KNEE | Status: FUNCTIONAL
Brand: TRIATHLON

## 2025-07-18 DEVICE — FULL DOSE BONE CEMENT, 10 PACK CATALOG NUMBER IS 6191-1-010
Type: IMPLANTABLE DEVICE | Site: KNEE | Status: FUNCTIONAL
Brand: SIMPLEX

## 2025-07-18 DEVICE — TIBIAL BEARING INSERT
Type: IMPLANTABLE DEVICE | Site: KNEE | Status: FUNCTIONAL
Brand: TRIATHLON

## 2025-07-18 DEVICE — PIN FIXATION SS FLUTE SQUARE L3.5 IN OD1/8 IN 7650-2038A: Type: IMPLANTABLE DEVICE | Site: KNEE | Status: FUNCTIONAL

## 2025-07-18 DEVICE — CRUCIATE RETAINING FEMORAL
Type: IMPLANTABLE DEVICE | Site: KNEE | Status: FUNCTIONAL
Brand: TRIATHLON

## 2025-07-18 RX ORDER — NALOXONE HYDROCHLORIDE 0.4 MG/ML
0.4 INJECTION, SOLUTION INTRAMUSCULAR; INTRAVENOUS; SUBCUTANEOUS
Status: DISCONTINUED | OUTPATIENT
Start: 2025-07-18 | End: 2025-07-20 | Stop reason: HOSPADM

## 2025-07-18 RX ORDER — HYDROMORPHONE HCL IN WATER/PF 6 MG/30 ML
0.4 PATIENT CONTROLLED ANALGESIA SYRINGE INTRAVENOUS EVERY 5 MIN PRN
Status: DISCONTINUED | OUTPATIENT
Start: 2025-07-18 | End: 2025-07-18 | Stop reason: HOSPADM

## 2025-07-18 RX ORDER — HYDROMORPHONE HCL IN WATER/PF 6 MG/30 ML
0.2 PATIENT CONTROLLED ANALGESIA SYRINGE INTRAVENOUS
Status: DISCONTINUED | OUTPATIENT
Start: 2025-07-18 | End: 2025-07-20 | Stop reason: HOSPADM

## 2025-07-18 RX ORDER — CEFAZOLIN SODIUM/WATER 2 G/20 ML
2 SYRINGE (ML) INTRAVENOUS SEE ADMIN INSTRUCTIONS
Status: DISCONTINUED | OUTPATIENT
Start: 2025-07-18 | End: 2025-07-18 | Stop reason: HOSPADM

## 2025-07-18 RX ORDER — THYROID 30 MG/1
30 TABLET ORAL DAILY
Status: DISCONTINUED | OUTPATIENT
Start: 2025-07-19 | End: 2025-07-20 | Stop reason: HOSPADM

## 2025-07-18 RX ORDER — MEPERIDINE HYDROCHLORIDE 50 MG/ML
12.5 INJECTION INTRAMUSCULAR; INTRAVENOUS; SUBCUTANEOUS EVERY 5 MIN PRN
Status: DISCONTINUED | OUTPATIENT
Start: 2025-07-18 | End: 2025-07-18 | Stop reason: HOSPADM

## 2025-07-18 RX ORDER — ONDANSETRON 2 MG/ML
4 INJECTION INTRAMUSCULAR; INTRAVENOUS EVERY 30 MIN PRN
Status: DISCONTINUED | OUTPATIENT
Start: 2025-07-18 | End: 2025-07-18 | Stop reason: HOSPADM

## 2025-07-18 RX ORDER — OXYCODONE HYDROCHLORIDE 5 MG/1
10 TABLET ORAL EVERY 4 HOURS PRN
Status: DISCONTINUED | OUTPATIENT
Start: 2025-07-18 | End: 2025-07-20 | Stop reason: HOSPADM

## 2025-07-18 RX ORDER — FENTANYL CITRATE 50 UG/ML
50 INJECTION, SOLUTION INTRAMUSCULAR; INTRAVENOUS EVERY 5 MIN PRN
Status: DISCONTINUED | OUTPATIENT
Start: 2025-07-18 | End: 2025-07-18 | Stop reason: HOSPADM

## 2025-07-18 RX ORDER — HYDROMORPHONE HCL IN WATER/PF 6 MG/30 ML
0.4 PATIENT CONTROLLED ANALGESIA SYRINGE INTRAVENOUS
Status: DISCONTINUED | OUTPATIENT
Start: 2025-07-18 | End: 2025-07-20 | Stop reason: HOSPADM

## 2025-07-18 RX ORDER — OXYCODONE HYDROCHLORIDE 5 MG/1
5 TABLET ORAL EVERY 6 HOURS PRN
Qty: 26 TABLET | Refills: 0 | Status: SHIPPED | OUTPATIENT
Start: 2025-07-18 | End: 2025-07-25

## 2025-07-18 RX ORDER — FENTANYL CITRATE 50 UG/ML
50 INJECTION, SOLUTION INTRAMUSCULAR; INTRAVENOUS
Status: DISCONTINUED | OUTPATIENT
Start: 2025-07-18 | End: 2025-07-18 | Stop reason: HOSPADM

## 2025-07-18 RX ORDER — CEFAZOLIN SODIUM/WATER 2 G/20 ML
2 SYRINGE (ML) INTRAVENOUS
Status: DISCONTINUED | OUTPATIENT
Start: 2025-07-18 | End: 2025-07-18 | Stop reason: HOSPADM

## 2025-07-18 RX ORDER — HYDROXYZINE HYDROCHLORIDE 10 MG/1
10 TABLET, FILM COATED ORAL EVERY 6 HOURS PRN
Status: DISCONTINUED | OUTPATIENT
Start: 2025-07-18 | End: 2025-07-20 | Stop reason: HOSPADM

## 2025-07-18 RX ORDER — ACETAMINOPHEN 325 MG/1
975 TABLET ORAL EVERY 8 HOURS
Status: DISCONTINUED | OUTPATIENT
Start: 2025-07-18 | End: 2025-07-18

## 2025-07-18 RX ORDER — CEFAZOLIN SODIUM 1 G/3ML
1 INJECTION, POWDER, FOR SOLUTION INTRAMUSCULAR; INTRAVENOUS EVERY 8 HOURS
Status: COMPLETED | OUTPATIENT
Start: 2025-07-18 | End: 2025-07-19

## 2025-07-18 RX ORDER — TRANEXAMIC ACID 650 MG/1
1950 TABLET ORAL ONCE
Status: COMPLETED | OUTPATIENT
Start: 2025-07-18 | End: 2025-07-18

## 2025-07-18 RX ORDER — KETOROLAC TROMETHAMINE 30 MG/ML
15 INJECTION, SOLUTION INTRAMUSCULAR; INTRAVENOUS EVERY 6 HOURS
Status: DISCONTINUED | OUTPATIENT
Start: 2025-07-18 | End: 2025-07-18

## 2025-07-18 RX ORDER — NALOXONE HYDROCHLORIDE 0.4 MG/ML
0.2 INJECTION, SOLUTION INTRAMUSCULAR; INTRAVENOUS; SUBCUTANEOUS
Status: DISCONTINUED | OUTPATIENT
Start: 2025-07-18 | End: 2025-07-20 | Stop reason: HOSPADM

## 2025-07-18 RX ORDER — THYROID 30 MG/1
30 TABLET ORAL DAILY
COMMUNITY

## 2025-07-18 RX ORDER — NALOXONE HYDROCHLORIDE 0.4 MG/ML
0.1 INJECTION, SOLUTION INTRAMUSCULAR; INTRAVENOUS; SUBCUTANEOUS
Status: DISCONTINUED | OUTPATIENT
Start: 2025-07-18 | End: 2025-07-18 | Stop reason: HOSPADM

## 2025-07-18 RX ORDER — SODIUM CHLORIDE, SODIUM LACTATE, POTASSIUM CHLORIDE, CALCIUM CHLORIDE 600; 310; 30; 20 MG/100ML; MG/100ML; MG/100ML; MG/100ML
INJECTION, SOLUTION INTRAVENOUS CONTINUOUS
Status: DISCONTINUED | OUTPATIENT
Start: 2025-07-18 | End: 2025-07-20 | Stop reason: HOSPADM

## 2025-07-18 RX ORDER — FLUMAZENIL 0.1 MG/ML
0.2 INJECTION, SOLUTION INTRAVENOUS
Status: DISCONTINUED | OUTPATIENT
Start: 2025-07-18 | End: 2025-07-18 | Stop reason: HOSPADM

## 2025-07-18 RX ORDER — TAMSULOSIN HYDROCHLORIDE 0.4 MG/1
0.4 CAPSULE ORAL EVERY EVENING
Status: DISCONTINUED | OUTPATIENT
Start: 2025-07-18 | End: 2025-07-20 | Stop reason: HOSPADM

## 2025-07-18 RX ORDER — SODIUM CHLORIDE, SODIUM LACTATE, POTASSIUM CHLORIDE, CALCIUM CHLORIDE 600; 310; 30; 20 MG/100ML; MG/100ML; MG/100ML; MG/100ML
INJECTION, SOLUTION INTRAVENOUS CONTINUOUS
Status: DISCONTINUED | OUTPATIENT
Start: 2025-07-18 | End: 2025-07-18 | Stop reason: HOSPADM

## 2025-07-18 RX ORDER — KETOROLAC TROMETHAMINE 30 MG/ML
15 INJECTION, SOLUTION INTRAMUSCULAR; INTRAVENOUS
Status: DISCONTINUED | OUTPATIENT
Start: 2025-07-18 | End: 2025-07-18 | Stop reason: HOSPADM

## 2025-07-18 RX ORDER — DEXAMETHASONE SODIUM PHOSPHATE 4 MG/ML
4 INJECTION, SOLUTION INTRA-ARTICULAR; INTRALESIONAL; INTRAMUSCULAR; INTRAVENOUS; SOFT TISSUE
Status: DISCONTINUED | OUTPATIENT
Start: 2025-07-18 | End: 2025-07-18 | Stop reason: HOSPADM

## 2025-07-18 RX ORDER — LORAZEPAM 2 MG/ML
.5-1 INJECTION INTRAMUSCULAR
Status: DISCONTINUED | OUTPATIENT
Start: 2025-07-18 | End: 2025-07-18 | Stop reason: HOSPADM

## 2025-07-18 RX ORDER — POLYETHYLENE GLYCOL 3350 17 G/17G
1 POWDER, FOR SOLUTION ORAL DAILY PRN
COMMUNITY

## 2025-07-18 RX ORDER — ONDANSETRON 2 MG/ML
4 INJECTION INTRAMUSCULAR; INTRAVENOUS EVERY 6 HOURS PRN
Status: DISCONTINUED | OUTPATIENT
Start: 2025-07-18 | End: 2025-07-20 | Stop reason: HOSPADM

## 2025-07-18 RX ORDER — ONDANSETRON 4 MG/1
4 TABLET, ORALLY DISINTEGRATING ORAL EVERY 6 HOURS PRN
Status: DISCONTINUED | OUTPATIENT
Start: 2025-07-18 | End: 2025-07-20 | Stop reason: HOSPADM

## 2025-07-18 RX ORDER — ONDANSETRON 4 MG/1
4 TABLET, ORALLY DISINTEGRATING ORAL EVERY 30 MIN PRN
Status: DISCONTINUED | OUTPATIENT
Start: 2025-07-18 | End: 2025-07-18 | Stop reason: HOSPADM

## 2025-07-18 RX ORDER — KETOROLAC TROMETHAMINE 30 MG/ML
15 INJECTION, SOLUTION INTRAMUSCULAR; INTRAVENOUS EVERY 6 HOURS
Status: COMPLETED | OUTPATIENT
Start: 2025-07-18 | End: 2025-07-19

## 2025-07-18 RX ORDER — HYDROMORPHONE HCL IN WATER/PF 6 MG/30 ML
0.2 PATIENT CONTROLLED ANALGESIA SYRINGE INTRAVENOUS EVERY 5 MIN PRN
Status: DISCONTINUED | OUTPATIENT
Start: 2025-07-18 | End: 2025-07-18 | Stop reason: HOSPADM

## 2025-07-18 RX ORDER — CITRIC ACID/SODIUM CITRATE 334-500MG
30 SOLUTION, ORAL ORAL ONCE
Status: COMPLETED | OUTPATIENT
Start: 2025-07-18 | End: 2025-07-18

## 2025-07-18 RX ORDER — OXYCODONE HYDROCHLORIDE 5 MG/1
5 TABLET ORAL EVERY 4 HOURS PRN
Status: DISCONTINUED | OUTPATIENT
Start: 2025-07-18 | End: 2025-07-20 | Stop reason: HOSPADM

## 2025-07-18 RX ORDER — FENTANYL CITRATE 50 UG/ML
100 INJECTION, SOLUTION INTRAMUSCULAR; INTRAVENOUS
Status: DISCONTINUED | OUTPATIENT
Start: 2025-07-18 | End: 2025-07-18 | Stop reason: HOSPADM

## 2025-07-18 RX ORDER — CALCIUM CARBONATE 500 MG/1
500 TABLET, CHEWABLE ORAL DAILY PRN
Status: DISCONTINUED | OUTPATIENT
Start: 2025-07-18 | End: 2025-07-20 | Stop reason: HOSPADM

## 2025-07-18 RX ORDER — METHOCARBAMOL 500 MG/1
250 TABLET ORAL EVERY 6 HOURS PRN
Status: DISCONTINUED | OUTPATIENT
Start: 2025-07-18 | End: 2025-07-20 | Stop reason: HOSPADM

## 2025-07-18 RX ORDER — FENTANYL CITRATE 50 UG/ML
25 INJECTION, SOLUTION INTRAMUSCULAR; INTRAVENOUS EVERY 5 MIN PRN
Status: DISCONTINUED | OUTPATIENT
Start: 2025-07-18 | End: 2025-07-18 | Stop reason: HOSPADM

## 2025-07-18 RX ORDER — LIDOCAINE 40 MG/G
CREAM TOPICAL
Status: DISCONTINUED | OUTPATIENT
Start: 2025-07-18 | End: 2025-07-18 | Stop reason: HOSPADM

## 2025-07-18 RX ORDER — LIDOCAINE 40 MG/G
CREAM TOPICAL
Status: DISCONTINUED | OUTPATIENT
Start: 2025-07-18 | End: 2025-07-20 | Stop reason: HOSPADM

## 2025-07-18 RX ORDER — AMOXICILLIN 250 MG
1 CAPSULE ORAL 2 TIMES DAILY
Status: DISCONTINUED | OUTPATIENT
Start: 2025-07-18 | End: 2025-07-20 | Stop reason: HOSPADM

## 2025-07-18 RX ORDER — ALLOPURINOL 100 MG/1
100 TABLET ORAL DAILY
Status: DISCONTINUED | OUTPATIENT
Start: 2025-07-19 | End: 2025-07-20 | Stop reason: HOSPADM

## 2025-07-18 RX ORDER — ASPIRIN 81 MG/1
81 TABLET ORAL 2 TIMES DAILY
Status: DISCONTINUED | OUTPATIENT
Start: 2025-07-18 | End: 2025-07-20 | Stop reason: HOSPADM

## 2025-07-18 RX ORDER — POLYETHYLENE GLYCOL 3350 17 G/17G
17 POWDER, FOR SOLUTION ORAL DAILY
Status: DISCONTINUED | OUTPATIENT
Start: 2025-07-19 | End: 2025-07-20 | Stop reason: HOSPADM

## 2025-07-18 RX ORDER — PROCHLORPERAZINE MALEATE 5 MG/1
5 TABLET ORAL EVERY 6 HOURS PRN
Status: DISCONTINUED | OUTPATIENT
Start: 2025-07-18 | End: 2025-07-20 | Stop reason: HOSPADM

## 2025-07-18 RX ORDER — BISACODYL 10 MG
10 SUPPOSITORY, RECTAL RECTAL DAILY PRN
Status: DISCONTINUED | OUTPATIENT
Start: 2025-07-18 | End: 2025-07-20 | Stop reason: HOSPADM

## 2025-07-18 RX ADMIN — OXYCODONE HYDROCHLORIDE 5 MG: 5 TABLET ORAL at 13:35

## 2025-07-18 RX ADMIN — SODIUM CHLORIDE, SODIUM LACTATE, POTASSIUM CHLORIDE, AND CALCIUM CHLORIDE: .6; .31; .03; .02 INJECTION, SOLUTION INTRAVENOUS at 06:26

## 2025-07-18 RX ADMIN — HYDROXYZINE HYDROCHLORIDE 10 MG: 10 TABLET, FILM COATED ORAL at 15:25

## 2025-07-18 RX ADMIN — SENNOSIDES AND DOCUSATE SODIUM 1 TABLET: 50; 8.6 TABLET ORAL at 20:29

## 2025-07-18 RX ADMIN — TAMSULOSIN HYDROCHLORIDE 0.4 MG: 0.4 CAPSULE ORAL at 20:29

## 2025-07-18 RX ADMIN — CALCIUM CARBONATE (ANTACID) CHEW TAB 500 MG 500 MG: 500 CHEW TAB at 17:08

## 2025-07-18 RX ADMIN — FENTANYL CITRATE 100 MCG: 50 INJECTION INTRAMUSCULAR; INTRAVENOUS at 07:03

## 2025-07-18 RX ADMIN — SODIUM CITRATE AND CITRIC ACID MONOHYDRATE 30 ML: 500; 334 SOLUTION ORAL at 06:47

## 2025-07-18 RX ADMIN — OXYCODONE HYDROCHLORIDE 5 MG: 5 TABLET ORAL at 09:26

## 2025-07-18 RX ADMIN — SODIUM CHLORIDE, SODIUM LACTATE, POTASSIUM CHLORIDE, AND CALCIUM CHLORIDE: .6; .31; .03; .02 INJECTION, SOLUTION INTRAVENOUS at 12:31

## 2025-07-18 RX ADMIN — SENNOSIDES AND DOCUSATE SODIUM 1 TABLET: 50; 8.6 TABLET ORAL at 12:50

## 2025-07-18 RX ADMIN — HYDROMORPHONE HYDROCHLORIDE 0.2 MG: 0.2 INJECTION, SOLUTION INTRAMUSCULAR; INTRAVENOUS; SUBCUTANEOUS at 11:25

## 2025-07-18 RX ADMIN — Medication 3 MG: at 20:29

## 2025-07-18 RX ADMIN — METHOCARBAMOL TABLETS 250 MG: 500 TABLET, COATED ORAL at 09:26

## 2025-07-18 RX ADMIN — KETOROLAC TROMETHAMINE 15 MG: 30 INJECTION, SOLUTION INTRAMUSCULAR at 23:52

## 2025-07-18 RX ADMIN — HYDROMORPHONE HYDROCHLORIDE 0.2 MG: 0.2 INJECTION, SOLUTION INTRAMUSCULAR; INTRAVENOUS; SUBCUTANEOUS at 16:41

## 2025-07-18 RX ADMIN — KETOROLAC TROMETHAMINE 15 MG: 30 INJECTION, SOLUTION INTRAMUSCULAR at 11:20

## 2025-07-18 RX ADMIN — CEFAZOLIN 1 G: 1 INJECTION, POWDER, FOR SOLUTION INTRAMUSCULAR; INTRAVENOUS at 15:25

## 2025-07-18 RX ADMIN — CEFAZOLIN 1 G: 1 INJECTION, POWDER, FOR SOLUTION INTRAMUSCULAR; INTRAVENOUS at 22:52

## 2025-07-18 RX ADMIN — OXYCODONE HYDROCHLORIDE 5 MG: 5 TABLET ORAL at 13:33

## 2025-07-18 RX ADMIN — TRANEXAMIC ACID 1950 MG: 650 TABLET ORAL at 06:14

## 2025-07-18 RX ADMIN — KETOROLAC TROMETHAMINE 15 MG: 30 INJECTION, SOLUTION INTRAMUSCULAR at 18:17

## 2025-07-18 RX ADMIN — ASPIRIN 81 MG: 81 TABLET, COATED ORAL at 12:50

## 2025-07-18 RX ADMIN — ASPIRIN 81 MG: 81 TABLET, COATED ORAL at 20:29

## 2025-07-18 RX ADMIN — HYDROXYZINE HYDROCHLORIDE 10 MG: 10 TABLET, FILM COATED ORAL at 10:13

## 2025-07-18 ASSESSMENT — ACTIVITIES OF DAILY LIVING (ADL)
ADLS_ACUITY_SCORE: 26
ADLS_ACUITY_SCORE: 28
ADLS_ACUITY_SCORE: 26
ADLS_ACUITY_SCORE: 22
ADLS_ACUITY_SCORE: 25
ADLS_ACUITY_SCORE: 26
ADLS_ACUITY_SCORE: 21
ADLS_ACUITY_SCORE: 25
ADLS_ACUITY_SCORE: 26
ADLS_ACUITY_SCORE: 25
ADLS_ACUITY_SCORE: 28
ADLS_ACUITY_SCORE: 28
ADLS_ACUITY_SCORE: 27
ADLS_ACUITY_SCORE: 26
ADLS_ACUITY_SCORE: 26
ADLS_ACUITY_SCORE: 27

## 2025-07-18 NOTE — INTERVAL H&P NOTE
"I have reviewed the surgical (or preoperative) H&P that is linked to this encounter, and examined the patient. There are no significant changes    Clinical Conditions Present on Arrival:  Clinically Significant Risk Factors Present on Admission                       # Overweight: Estimated body mass index is 26.63 kg/m  as calculated from the following:    Height as of this encounter: 1.676 m (5' 6\").    Weight as of this encounter: 74.8 kg (165 lb).       "

## 2025-07-18 NOTE — PROGRESS NOTES
07/18/25 3925   Appointment Info   Signing Clinician's Name / Credentials (PT) Nataly Suero, PT, DPT   Quick Adds   Quick Adds Certification   Living Environment   People in Home spouse   Current Living Arrangements condominium   Home Accessibility other (see comments)  (Elevator and ramp)   Living Environment Comments Does not need to use stairs   Self-Care   Equipment Currently Used at Home walker, rolling  (uses at night only)   Fall history within last six months yes   Number of times patient has fallen within last six months   (Unable to state but several)   Activity/Exercise/Self-Care Comment Has 4WW   General Information   Onset of Illness/Injury or Date of Surgery 07/18/25   Referring Physician Dr. Spenser Almonte   Patient/Family Therapy Goals Statement (PT) Walk without pain   Pertinent History of Current Problem (include personal factors and/or comorbidities that impact the POC) s/p R TKA, CKD   Weight-Bearing Status - RLE weight-bearing as tolerated   Range of Motion (ROM)   ROM Comment decreased ROM s/p R TKA   Bed Mobility   Bed Mobility supine-sit   Supine-Sit [place] (Bed Mobility) verbal cues;minimum assist (75% patient effort)   Transfers   Transfers sit-stand transfer   Maintains Weight-bearing Status (Transfers) able to maintain   Sit-Stand Transfer   Sit-Stand [place] (Transfers) contact guard;verbal cues;2 person assist   Assistive Device (Sit-Stand Transfers) walker, front-wheeled   Gait/Stairs (Locomotion)   [place] Level (Gait) contact guard;2 person assist;verbal cues   Assistive Device (Gait) walker, front-wheeled   Distance in Feet (Gait) Stand pivot transfer bed to chair   Pattern (Gait) step-to   Deviations/Abnormal Patterns (Gait) antalgic;toribio decreased;festinating/shuffling   Maintains Weight-bearing Status (Gait) able to maintain   Clinical Impression   Criteria for Skilled Therapeutic Intervention Yes, treatment indicated   PT Diagnosis (PT) impaired  functional mobility   Influenced by the following impairments pain, decreased ROM, decreased strength   Functional limitations due to impairments gait, transfers   Clinical Presentation (PT Evaluation Complexity) stable   Clinical Presentation Rationale pt presents as medically diagnosed   Clinical Decision Making (Complexity) low complexity   Planned Therapy Interventions (PT) gait training;home exercise program;patient/family education;ROM (range of motion);strengthening;transfer training;groups;bed mobility training   Risk & Benefits of therapy have been explained care plan/treatment goals reviewed;patient   PT Total Evaluation Time   PT Eval, Low Complexity Minutes (32600) 10   Therapy Certification   Start of care date 07/18/25   Certification date from 07/18/25   Certification date to 07/21/25   Medical Diagnosis S/P R TKA   Physical Therapy Goals   PT Frequency Daily   PT Predicted Duration/Target Date for Goal Attainment 07/21/25   PT Goals PT Goal 1;Gait   PT: Gait Rolling walker;Supervision/stand-by assist;100 feet   PT: Goal 1 SBA with written HEP for LE strengthening and ROM   Interventions   Interventions Quick Adds Therapeutic Procedure;Therapeutic Activity;Gait Training   Therapeutic Procedure/Exercise   Ther. Procedure: strength, endurance, ROM, flexibillity Minutes (24583) 15   Symptoms Noted During/After Treatment fatigue;increased pain   Treatment Detail/Skilled Intervention TKA protocol therex x5-10 reps with R LE, verbal and tactile cueing for exercise technique and attention to task   Therapeutic Activity   Symptoms Noted During/After Treatment Increased pain   Treatment Detail/Skilled Intervention Supine to sit with min assist for R LE, verbal cueing for technique and use of L LE to assist. sit to/from stand with FWW, CGA of 2 due to pain, verbal cueing for safe hand placement and LE positioning. Stand pivot transfer to right with FWW, CGA of 2, verbal cueing for LE advancement, safety, device  advancement. Patient in chair with chair alarm on, RN notified of patient's movement ability   PT Discharge Planning   PT Plan Gait, TK exercises   PT Discharge Recommendation (DC Rec) other (see comments)  (Defer to ortho)   PT Rationale for DC Rec Patient needing assist for all mobility at this time due to pain, expect improvement with pain management. Spouse for support at home, no stairs   PT Brief overview of current status Stand pivot transfer with FWW, CGA of 2   PT Total Distance Amb During Session (feet) 0   PT Equipment Needed at Discharge walker, rolling   Physical Therapy Time and Intention   Timed Code Treatment Minutes 15   Total Session Time (sum of timed and untimed services) 25   [facility]                                                                                   OUTPATIENT PHYSICAL THERAPY    PLAN OF TREATMENT FOR OUTPATIENT REHABILITATION   Patient's Last Name, First Name, Rasheed Miranda YOB: 1945   Provider's Name   [facility]   Medical Record No.  [id number]     Onset Date: 07/18/25 Start of Care Date: 07/18/25     Medical Diagnosis:  S/P R TKA               PT Diagnosis:  impaired functional mobility Certification Dates:  From: 07/18/25  To: 07/21/25       See note for plan of treatment, functional goals, and certification details.    I CERTIFY THE NEED FOR THESE SERVICES FURNISHED UNDER        THIS PLAN OF TREATMENT AND WHILE UNDER MY CARE (Physician co-signature of this document indicates review and certification of the therapy plan).

## 2025-07-18 NOTE — CONSULTS
[facility]  Consult Note - Hospitalist Service  Date of Admission:  7/18/2025  Consult Requested by: Dr. Almonte  Reason for Consult: medication co-management    Assessment & Plan   Rasheed Hanson is a 80 year old male admitted on 7/18/2025. He has a past medical history of bronchitis with bronchospasms that has been stable, hypothyroidism, chronic kidney disease stage IIIa, who is here for orthopedic surgery.    The preop visit with Dr. Yates and also Provider Derrick harper are reviewed. Preop labs include 7/9/2025 sodium 148, potassium 4.1, creatinine 1.18 with a previous baseline of 1.1-1.2, WBC 8.2, hemoglobin 14, platelets 231. Home medications reviewed.      S/p RIGHT TKA on 7/18/2025  by Dr. Almonte w/ [facility]. Internal Medicine is consulted for medication co-management. Home medications are reconciled from our standpoint.  Seen on pacu11. Vitally stable on RA. Thank you for the consult, we will follow along.     For some reason the pt thought his home medication list might 'be different' and requested to inform pharmacy this, so I did do this and spoke directly w/ the pharmacist who'll review.    -----  S/p right total knee Arthroplasty  A- stable, on RA  P:  -DVT prophylaxis and pain management per primary service  -We will monitor for complications including respiratory concerns, urinary retention, ileus, skin reactions, infections, medication side effects, etc.  -Incentive spirometry   -discharge disposition per primary service    bronchitis with bronchospasms   A- stable on RA. His hx of this has been stable  P:  - continue home prn medications    Hypothyroidism  A/p- continue thyroid medication    chronic kidney disease stage IIIa  A- preop was cr 1.18, stable within baseline range of 1.1-1.2.   P:  - avoid nephrotoxic agents  - mivfs per primary, wean off when PO intake normalizes  - check AM labs      Prior history of malignant tumor of prostate  A/P-follows with urology  -  "Monitor clinically for any signs or symptoms of urinary problems or retention             Clinically Significant Risk Factors Present on Admission                             # Overweight: Estimated body mass index is 26.63 kg/m  as calculated from the following:    Height as of this encounter: 1.676 m (5' 6\").    Weight as of this encounter: 74.8 kg (165 lb).              Dhiraj Cotto MD  Hospitalist Service  Securely message with [employer] (more info)  Text page via [facility] Paging/Directory   ______________________________________________________________________    Chief Complaint   Knee pain    History is obtained from the patient    History of Present Illness   Rasheed Hanson is a 80 year old male who has a past medical history of bronchitis with bronchospasms that has been stable, hypothyroidism, chronic kidney disease stage IIIa, who is here for orthopedic surgery.    The preop visit with Dr. Yates and also Provider Derrick harper are reviewed. Preop labs include 7/9/2025 sodium 148, potassium 4.1, creatinine 1.18 with a previous baseline of 1.1-1.2, WBC 8.2, hemoglobin 14, platelets 231. Home medications reviewed.      S/p RIGHT TKA on 7/18/2025  by Dr. Almonte w/ [facility]. Internal Medicine is consulted for medication co-management. Home medications are reconciled from our standpoint.  Seen on pacu11. Vitally stable on RA.     Patient has no new symptoms or concerns or complaints.  We discussed home medications.  Discussed pain per primary team as well as DVT prophylaxis.  Patient has no fevers or chills, chest pain, shortness of breath, abdominal pain, neurologic or sensory changes or weakness.  We discussed monitoring for gas.    Patient is from [place], [place].     For some reason the pt thought his home medication list might 'be different' and requested to inform pharmacy this, so I did do this and spoke directly w/ the pharmacist who'll review.    Past Medical History    Past Medical History:   Diagnosis " Date    Acute gouty arthropathy     Created by Conversion     Bronchitis, not specified as acute or chronic     Created by Conversion     Cancer (H)     Congestive heart failure (H)     Dermatophytosis of foot     Created by Conversion     Enthesopathy     Created by Conversion  Replacement Utility updated for latest IMO load    Infective otitis externa     Created by Conversion  Replacement Utility updated for latest IMO load    Le's neuroma     Other and unspecified hyperlipidemia     Created by Conversion     Renal disease     Sleep apnea     Thoracic or lumbosacral neuritis or radiculitis, unspecified     Created by Conversion     Thyroid disease        Past Surgical History   Past Surgical History:   Procedure Laterality Date    ARTHROSCOPY SHOULDER ROTATOR CUFF REPAIR Right     LASIK         Medications   I have reviewed this patient's current medications       Review of Systems    The 10 point Review of Systems is negative other than noted in the HPI or here.      Social History   I have reviewed this patient's social history and updated it with pertinent information if needed.  Social History     Tobacco Use    Smoking status: Never     Passive exposure: Never    Smokeless tobacco: Never   Vaping Use    Vaping status: Never Used   Substance Use Topics    Alcohol use: Yes     Alcohol/week: 14.0 standard drinks of alcohol    Drug use: No         Allergies   Allergies   Allergen Reactions    Penicillins Unknown    Sulfa (Sulfonamide Antibiotics) [Sulfa Antibiotics] Rash    Tylenol [Acetaminophen] Other (See Comments)     Irregular heartrate        Physical Exam   Vital Signs: Temp: 98.4  F (36.9  C) Temp src: Core BP: 115/63 Pulse: 91   Resp: 16 SpO2: 96 % O2 Device: None (Room air) Oxygen Delivery: 6 LPM  Weight: 165 lbs 0 oz    General: alert, oriented, and in no acute distress  Pulmonary: clear to auscultation bilaterally, normal respiratory effort, on room air, no rales or wheezes or evidence of  "accessory muscle use  CVS: regular rate and rhythm, no murmurs, rubs, or gallops; no blatant jugular venous distention; no extremity edema and extremities are warm to the touch  GI: soft, nontender, BS+, no rebound or guarding, no conspicuous organomegaly   Neuro: nonfocal, moves all extremities of own volition and strength 5/5 in 4 limbs   Psych: appropriate  Msk- stable, right lower limb in dressing, c/d/I; other limbs are wnl    Medical Decision Making       81 MINUTES SPENT BY ME on the date of service doing chart review, history, exam, documentation & further activities per the note.      Data   ------------------------- PAST 24 HR DATA REVIEWED -----------------------------------------------        Imaging results reviewed over the past 24 hrs:   Recent Results (from the past 24 hours)   POC US Guidance Needle Placement    Narrative    Ultrasound was performed as guidance to an anesthesia procedure.  Click   \"PACS images\" hyperlink below to view any stored images.  For specific   procedure details, view procedure note authored by anesthesia.   XR Knee Port Right 1/2 Views    Narrative    EXAM: XR KNEE PORT RIGHT 1/2 VIEWS  LOCATION: [facility]  DATE: 7/18/2025    INDICATION: Post Op Total Knee  COMPARISON: 5/16/2025      Impression    IMPRESSION: Status post recent right total knee arthroplasty. No immediate hardware complication. Expected postsurgical soft tissue swelling, subcutaneous emphysema, and gas containing joint effusion. No acute fracture or malalignment.     "

## 2025-07-18 NOTE — PHARMACY-ADMISSION MEDICATION HISTORY
Pharmacist Admission Medication History    Admission medication history is complete. The information provided in this note is only as accurate as the sources available at the time of the update.    Information Source(s): Patient and CareEverywhere/SureScripts via in-person    Pertinent Information: N/A    Allergies reviewed with patient and updates made in EHR: yes    Medication History Completed By: Elier Clemens [facility] 7/18/2025 6:48 AM    PTA Med List   Medication Sig Last Dose/Taking    allopurinol (ZYLOPRIM) 100 MG tablet Take 1 tablet (100 mg) by mouth daily. 7/18/2025 Morning    GLUCOSAM HCL/CHONDRO FRIEDMAN A/C/MN (GLUCOSAMINE-CHONDROITIN COMPLX ORAL) [GLUCOSAM HCL/CHONDRO FRIEDMAN A/C/MN (GLUCOSAMINE-CHONDROITIN COMPLX ORAL)] Take 1 tablet daily 7/11/2025    MELATONIN ORAL Take 3 mg by mouth at bedtime. 7/17/2025 Evening    Multiple Vitamin (MULTI VITAMIN) TABS Take 1 tablet by mouth daily 7/11/2025    polyethylene glycol (MIRALAX) 17 g packet Take 1 packet by mouth daily as needed for constipation. Past Week    SAW PALMETTO ORAL Take 1 tablet by mouth daily. 7/11/2025    sildenafil (REVATIO) 20 MG tablet Take 1-5 tablets ( mg) by mouth daily as needed (erectile dysfunction). Past Month    tamsulosin (FLOMAX) 0.4 MG capsule Take 1 capsule (0.4 mg) by mouth daily. 7/17/2025 Evening    thyroid (ARMOUR) 30 MG tablet Take 30 mg by mouth daily. 7/18/2025 Morning
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
## 2025-07-18 NOTE — OP NOTE
Operative Report    PATIENT Rasheed Hanson   DATE OF SURGERY:  7/18/2025    PREOPERATIVE DIAGNOSIS   Osteoarthritis of right knee [M17.11].    POSTOPERATIVE DIAGNOSIS   Osteoarthritis of right knee [M17.11].    PROCEDURE PERFORMED   RIGHT total knee arthroplasty    IMPLANTS  Implant Name Type Inv. Item Serial No.  Lot No. LRB No. Used Action   PIN FIXATION SS FLUTE SQUARE L3.5 IN OD1/8 IN 8276-5566E - [id number] Metallic Hardware/[place] PIN FIXATION SS FLUTE SQUARE L3.5 IN OD1/8 IN 6546-4517Y  [employer] [id number] Right 1 Used as a Supply   BONE CEMENT SIMPLEX FULL DOSE 6191-1-001 - [id number] Cement, Bone BONE CEMENT SIMPLEX FULL DOSE 6191-1-001  [employer] [id number] Right 1 Implanted   IMP COMP FEM STRK TRIATHLN CR RT 4 5510-F-402 - [id number] Total Joint Component/Insert IMP COMP FEM STRK TRIATHLN CR RT 4 5510-F-402  [employer] [id number] Right 1 Implanted   IMP COMP PATELLA HOWM TRI 35 10MM 5551-L-350 - [id number] Total Joint Component/Insert IMP COMP PATELLA HOWM TRI 35 10MM 5551-L-350  [employer] [id number] Right 1 Implanted   INSERT TIB 5 9MM KN PE CNDRL STAB BRNG TRTHLN STRL LF - [id number] Total Joint Component/Insert INSERT TIB 5 9MM KN PE CNDRL STAB BRNG TRTHLN STRL LF  [employer] [id number] Right 1 Implanted   IMP BASEPLATE TIBIAL HOWM TRI 5 5520-B-500 - [id number] Total Joint Component/Insert IMP BASEPLATE TIBIAL HOWM TRI 5 5520-B-500  [employer] [id number] Right 1 Implanted       SURGEON  Spenser Almonte MD     ASSISTANT   Jane Mcwilliams PA-C; assistant was required for patient positioning, surgical assistance, wound closure and monitoring patient's safety throughout the case.    ANESTHESIA  Choice      FINDINGS:  Full thickness cartilage loss.     SPECIMENS:  none    ESTIMATED BLOOD LOSS:  50 cc    COMPLICATIONS   None.      INDICATION FOR PROCEDURE  Rasheed Hanson is a 80 year old male who I evaluated in my clinic for severe RIGHT knee pain.  X-rays  confirmed end-stage osteoarthritis.  All conservative treatments were exhausted including: Activity modification, NSAIDs and intra-articular injections. These no longer provided symptom relief.  The patient's quality of life and activities of daily living were being greatly affected.  Due to this, the patient was indicated for total knee arthroplasty.      INFORMED CONSENT  Rasheed Hanson was identified in the preoperative holding area and was identified using medical record number, name, and date of birth, all of which were confirmed. The operative extremity was marked using an indelible marker. Once again, the risks, benefits and expected outcomes of the procedure were discussed in full.  This discussion included, but was not limited to: Bleeding, nerve/vascular injury, fracture, instability, implant complications, continued pain, stiffness, scarring/incision numbness, infection, anesthetic/medical complications, death.  After this discussion patient elected to proceed with procedure, and did sign informed consent.    DESCRIPTION OF PROCEDURE   Rasheed Hanson received a regional block in the preoperative holding area.  They were then brought back to the operating room and placed on the operating table in the supine position.  All bony prominences were well-padded.  A well-padded tourniquet was placed high on the operative thigh. The operative leg was then sterilely prepped and draped in the usual fashion with ChloraPrep.     A timeout was performed prior to the start of the procedure.  This confirmed the patient's name, correct site and side of surgery, and the procedure being performed.  Allergies were also confirmed.  All necessary implants were confirmed and available.  Administration of IV antibiotics and TXA were confirmed. Three independent staff members agreed with the information discussed in the timeout.     The leg was exsanguinated and the tourniquet was inflated.  A midline approach to the knee  was utilized.  Sharp dissection was performed down to the level of the capsule. Medial and lateral skin flaps were raised.  A medial parapatellar arthrotomy was performed. The anterior horn of the medial meniscus was resected and a medial release was performed around the tibial plateau. Medial osteophytes were resected.  The knee was extended and patellar fat pad was excised being careful to protect the patellar tendon.      Attention was turned back to the femur.  The ACL was sacrificed. This the opening drill was utilized to open the femoral canal.  The flexible intramedullary guide was then placed.  The distal femoral cutting jig was pinned into place in 5 degrees of valgus with a depth of 8 mm.  Intramedullary guide was then removed and distal femoral cut was then made.  This was ensured to be flat.  The pins were left in place and attention was turned to the tibia.     With the knee flexed and retractors placed to protect ligaments and vascular structures, the extramedullary tibial guide was placed.   The slope was checked and the distal aspect was centered on the ankle.  The jig was pinned, and depth of resection was verified.  The proximal tibial resection was performed using an oscillating saw being careful to protect the bone block, PCL and posterior structures.  The jig was removed with the pins were left in place.  At this point, the knee was brought down to full extension and a 9 mm spacer block was placed.  Checked the medial and lateral gaps which were found to be acceptable after medial release and osteophyte resection.  The 2 pins in the tibia and the 2 pins in the femur were then removed.     Attention was turned back to the distal femur.  The knee was flexed to 90 degrees.  Using the [employer] tensioner, the rotation of the flexion gap was checked and found to be 3 degrees.  4-in-1 cutting guide was then set to this amount of external rotation.  This was then applied to the distal femur and the  femur was appropriately sized.  The  holes were drilled.  The 4-in-1 cutting guide was then pinned into place.  The posterior cut was made first and again a 9 mm spacer block was utilized to ensure that the flexion space would accept a 9 mm poly.  Once this was done the remainder of the cuts were made.    A laminar  was placed in the lateral side.  The medial meniscus was removed.  The posterior medial osteophytes were removed.  The shoulder was then moved to the medial side and the lateral meniscus was removed.  The posterior lateral osteophytes were also removed.     At this point, we moved to trialing.  A femoral trial was placed.  A tibial baseplate with a 9 mm poly preloaded onto it were then placed onto the tibia and the knee was brought down into extension.  Full extension was obtained.  The medial-lateral balance was acceptable.  Flexion to 130 degrees was obtained.  The medial and lateral balance at 90 degrees of flexion was also found to be acceptable. Drawer exam was also found to be acceptable.     We then everted the patella and placed towel clamps.  This was cut with a freehand technique.   The patella was sized with a caliper, and an oscillating saw was used to make a flush cut.  The size of the patella was determined, and the lugs holes were drilled.  A trial patellar component was then placed onto the patella the patella was reduced and the knee was flexed up ensuring adequate patellar tracking. The lugs were drilled for the femur.      The trial components were then removed.  The proximal tibia was exposed with retractors in place.  The tibial component was appropriately sized and set to the appropriate rotation and then pinned into place.  The tibia was then punched.  The baseplate was then removed.  Local injection cocktail was placed in the soft tissues surrounding the knee.  The bony surfaces were then thoroughly irrigated and dried with a sponge in preparation for cementing.      The cement was mixed and the components were sequentially cemented.  The tibia was impacted down into place and excess cement was removed.  The final poly was placed into the tibial baseplate and this was reduced back under the femur.  Cement was applied to the distal femur.  Femoral component was then impacted into place and cement was removed.  The knee was brought down into full extension.  Cement was applied to the patella and the patella was gently placed and the excess cement was again removed.  At this point, the cement was allowed to fully cure.    Again the balance both in flexion and extension was found to be adequate.  Full extension was obtained.  130 degrees of flexion was obtained.     The wound was closed in layers with a #2 vicryl and #1 Stratafix for the capsular closure, 2-0 vicryl, and 3-0 monocryl, followed by skin glue. The aquacel dressing was applied, followed by a full leg ACE wrap.      The patient was then transferred to the postoperative bed, awoken from anesthesia and taken to recovery in stable condition.  There were no complications.  The patient tolerated the procedure well.    POSTOPERATIVE PLAN:  -Pain control  -PT/OT, WBAT  -24 hours IV antibiotics  -ASA for DVT ppx  -X-rays to be completed in PACU    Spenser Almonte MD  [facility]

## 2025-07-19 ENCOUNTER — APPOINTMENT (OUTPATIENT)
Dept: OCCUPATIONAL THERAPY | Facility: CLINIC | Age: 80
End: 2025-07-19
Attending: ORTHOPAEDIC SURGERY
Payer: COMMERCIAL

## 2025-07-19 ENCOUNTER — APPOINTMENT (OUTPATIENT)
Dept: PHYSICAL THERAPY | Facility: CLINIC | Age: 80
End: 2025-07-19
Attending: ORTHOPAEDIC SURGERY
Payer: COMMERCIAL

## 2025-07-19 LAB
ANION GAP SERPL CALCULATED.3IONS-SCNC: 9 MMOL/L (ref 7–15)
BUN SERPL-MCNC: 18.3 MG/DL (ref 8–23)
CALCIUM SERPL-MCNC: 9.9 MG/DL (ref 8.8–10.4)
CHLORIDE SERPL-SCNC: 105 MMOL/L (ref 98–107)
CREAT SERPL-MCNC: 1.15 MG/DL (ref 0.67–1.17)
EGFRCR SERPLBLD CKD-EPI 2021: 64 ML/MIN/1.73M2
GLUCOSE SERPL-MCNC: 123 MG/DL (ref 70–99)
HCO3 SERPL-SCNC: 24 MMOL/L (ref 22–29)
HGB BLD-MCNC: 12.8 G/DL (ref 13.3–17.7)
MAGNESIUM SERPL-MCNC: 2.1 MG/DL (ref 1.7–2.3)
MCV RBC AUTO: 96 FL (ref 78–100)
PHOSPHATE SERPL-MCNC: 3.3 MG/DL (ref 2.5–4.5)
POTASSIUM SERPL-SCNC: 4.3 MMOL/L (ref 3.4–5.3)
SODIUM SERPL-SCNC: 138 MMOL/L (ref 135–145)
TSH SERPL DL<=0.005 MIU/L-ACNC: 1.58 UIU/ML (ref 0.3–4.2)

## 2025-07-19 PROCEDURE — 99232 SBSQ HOSP IP/OBS MODERATE 35: CPT | Performed by: STUDENT IN AN ORGANIZED HEALTH CARE EDUCATION/TRAINING PROGRAM

## 2025-07-19 PROCEDURE — 85018 HEMOGLOBIN: CPT | Performed by: ORTHOPAEDIC SURGERY

## 2025-07-19 PROCEDURE — 97535 SELF CARE MNGMENT TRAINING: CPT | Mod: GO

## 2025-07-19 PROCEDURE — 250N000013 HC RX MED GY IP 250 OP 250 PS 637: Performed by: FAMILY MEDICINE

## 2025-07-19 PROCEDURE — 36415 COLL VENOUS BLD VENIPUNCTURE: CPT | Performed by: STUDENT IN AN ORGANIZED HEALTH CARE EDUCATION/TRAINING PROGRAM

## 2025-07-19 PROCEDURE — 84100 ASSAY OF PHOSPHORUS: CPT | Performed by: INTERNAL MEDICINE

## 2025-07-19 PROCEDURE — 97116 GAIT TRAINING THERAPY: CPT | Mod: GP

## 2025-07-19 PROCEDURE — 97150 GROUP THERAPEUTIC PROCEDURES: CPT | Mod: GP

## 2025-07-19 PROCEDURE — 84443 ASSAY THYROID STIM HORMONE: CPT | Performed by: INTERNAL MEDICINE

## 2025-07-19 PROCEDURE — 93005 ELECTROCARDIOGRAM TRACING: CPT

## 2025-07-19 PROCEDURE — 93010 ELECTROCARDIOGRAM REPORT: CPT | Performed by: INTERNAL MEDICINE

## 2025-07-19 PROCEDURE — 250N000013 HC RX MED GY IP 250 OP 250 PS 637: Performed by: STUDENT IN AN ORGANIZED HEALTH CARE EDUCATION/TRAINING PROGRAM

## 2025-07-19 PROCEDURE — 80048 BASIC METABOLIC PNL TOTAL CA: CPT | Performed by: STUDENT IN AN ORGANIZED HEALTH CARE EDUCATION/TRAINING PROGRAM

## 2025-07-19 PROCEDURE — 97166 OT EVAL MOD COMPLEX 45 MIN: CPT | Mod: GO

## 2025-07-19 PROCEDURE — 250N000011 HC RX IP 250 OP 636: Performed by: ORTHOPAEDIC SURGERY

## 2025-07-19 PROCEDURE — 250N000013 HC RX MED GY IP 250 OP 250 PS 637: Performed by: ORTHOPAEDIC SURGERY

## 2025-07-19 PROCEDURE — 83735 ASSAY OF MAGNESIUM: CPT | Performed by: INTERNAL MEDICINE

## 2025-07-19 RX ADMIN — HYDROXYZINE HYDROCHLORIDE 10 MG: 10 TABLET, FILM COATED ORAL at 22:30

## 2025-07-19 RX ADMIN — OXYCODONE HYDROCHLORIDE 5 MG: 5 TABLET ORAL at 08:39

## 2025-07-19 RX ADMIN — METHOCARBAMOL TABLETS 250 MG: 500 TABLET, COATED ORAL at 22:30

## 2025-07-19 RX ADMIN — SENNOSIDES AND DOCUSATE SODIUM 1 TABLET: 50; 8.6 TABLET ORAL at 20:07

## 2025-07-19 RX ADMIN — OXYCODONE HYDROCHLORIDE 5 MG: 5 TABLET ORAL at 20:33

## 2025-07-19 RX ADMIN — SENNOSIDES AND DOCUSATE SODIUM 1 TABLET: 50; 8.6 TABLET ORAL at 08:38

## 2025-07-19 RX ADMIN — LEVOTHYROXINE, LIOTHYRONINE 30 MG: 19; 4.5 TABLET ORAL at 06:32

## 2025-07-19 RX ADMIN — ALLOPURINOL 100 MG: 100 TABLET ORAL at 08:37

## 2025-07-19 RX ADMIN — TAMSULOSIN HYDROCHLORIDE 0.4 MG: 0.4 CAPSULE ORAL at 20:07

## 2025-07-19 RX ADMIN — METHOCARBAMOL TABLETS 250 MG: 500 TABLET, COATED ORAL at 12:14

## 2025-07-19 RX ADMIN — KETOROLAC TROMETHAMINE 15 MG: 30 INJECTION, SOLUTION INTRAMUSCULAR at 05:46

## 2025-07-19 RX ADMIN — Medication 3 MG: at 22:31

## 2025-07-19 RX ADMIN — POLYETHYLENE GLYCOL 3350 17 G: 17 POWDER, FOR SOLUTION ORAL at 08:38

## 2025-07-19 RX ADMIN — HYDROXYZINE HYDROCHLORIDE 10 MG: 10 TABLET, FILM COATED ORAL at 12:14

## 2025-07-19 RX ADMIN — ASPIRIN 81 MG: 81 TABLET, COATED ORAL at 20:07

## 2025-07-19 RX ADMIN — ASPIRIN 81 MG: 81 TABLET, COATED ORAL at 08:38

## 2025-07-19 ASSESSMENT — ACTIVITIES OF DAILY LIVING (ADL)
ADLS_ACUITY_SCORE: 37
ADLS_ACUITY_SCORE: 28
ADLS_ACUITY_SCORE: 37
ADLS_ACUITY_SCORE: 28
ADLS_ACUITY_SCORE: 37
ADLS_ACUITY_SCORE: 36
ADLS_ACUITY_SCORE: 28
ADLS_ACUITY_SCORE: 37
ADLS_ACUITY_SCORE: 28
ADLS_ACUITY_SCORE: 37
ADLS_ACUITY_SCORE: 28
ADLS_ACUITY_SCORE: 37
ADLS_ACUITY_SCORE: 37
ADLS_ACUITY_SCORE: 28

## 2025-07-19 NOTE — PROGRESS NOTES
Nursing staff reports patient's HR are in the 40s. Asymptomatic. Vitals stable.   -EKG  -BMP, mg, phos

## 2025-07-19 NOTE — PROGRESS NOTES
07/19/25 1020   Appointment Info   Signing Clinician's Name / Credentials (OT) Rachel Diaz, OTR/L   Rehab Comments (OT) OT brenna   Quick Adds   Quick Adds Certification   Living Environment   People in Home spouse   Current Living Arrangements   (condo)   Living Environment Comments Pt can stay on one leve. Pt and daughter believe he has RTS but may also have commode and 2 shower chair options with WIS.   Self-Care   Usual Activity Tolerance good   Current Activity Tolerance moderate   Fall history within last six months yes  (unclear how many)   Activity/Exercise/Self-Care Comment Pt independent w/ ADL and IADL at baseline.   General Information   Onset of Illness/Injury or Date of Surgery 07/18/25   Referring Physician Spenser Almonte MD   Patient/Family Therapy Goal Statement (OT) hopeful to stay one more night   Additional Occupational Profile Info/Pertinent History of Current Problem s/p R TKA-h/o Bronchitis with Bronchospasm, CKD   Existing Precautions/Restrictions weight bearing   Right Upper Extremity (Weight-bearing Status) weight-bearing as tolerated (WBAT)   General Observations and Info Pt needing freq vc and redirection for on task activity, easily distracted   Cognitive Status Examination   Affect/Mental Status (Cognitive) WFL   Cognitive Status Comments Pt needing freq verbal cues and reminders for safe tech and problem solving. Daughter present throughout session, aware of cues to provide for father.   Visual Perception   Visual Impairment/Limitations WFL   Sensory   Sensory Quick Adds sensation intact   Posture   Posture not impaired   Range of Motion Comprehensive   General Range of Motion no range of motion deficits identified   Strength Comprehensive (MMT)   General Manual Muscle Testing (MMT) Assessment no strength deficits identified   Muscle Tone Assessment   Muscle Tone Quick Adds No deficits were identified   Coordination   Upper Extremity Coordination No deficits were identified   Bed  Mobility   Bed Mobility supine-sit;sit-supine   Comment (Bed Mobility) CGA   Transfers   Transfers bed-chair transfer;sit-stand transfer;toilet transfer;shower transfer   Transfer Comments SBA-CGA   Transfer Skill: Bed to Chair/Chair to Bed   Bed-Chair [place] (Transfers) contact guard   Sit-Stand Transfer   Sit-Stand [place] (Transfers) contact guard   Shower Transfer   Type (Shower Transfer) sit-stand;stand-sit   [place] Level (Shower Transfer) minimum assist (75% patient effort)   Toilet Transfer   Type (Toilet Transfer) sit-stand;stand-sit   [place] Level (Toilet Transfer) contact guard   Balance   Balance Comments decreased   Activities of Daily Living   BADL Assessment/Intervention lower body dressing;toileting   Lower Body Dressing Assessment/Training   [place] Level (Lower Body Dressing) pants/bottoms;shoes/slippers;socks;moderate assist (50% patient effort)   Toileting   [place] Level (Toileting) toileting skills;supervision   Clinical Impression   Criteria for Skilled Therapeutic Interventions Met (OT) Yes, treatment indicated   OT Diagnosis decreased ADLs   Influenced by the following impairments TKA   OT Problem List-Impairments impacting ADL activity tolerance impaired;pain;mobility   Assessment of Occupational Performance 3-5 Performance Deficits   Identified Performance Deficits dressing, bathing, functional mobility, toileting   Planned Therapy Interventions (OT) ADL retraining;bed mobility training;transfer training   Clinical Decision Making Complexity (OT) detailed assessment/moderate complexity   Risk & Benefits of therapy have been explained evaluation/treatment results reviewed;patient   OT Total Evaluation Time   OT Eval, Moderate Complexity Minutes (29926) 10   Therapy Certification   Start of Care Date 07/19/25   Certification date from 07/19/25   Certification date to 07/19/25   Medical Diagnosis R TKA   OT Goals   Therapy Frequency (OT) One time eval and  treatment   OT Predicted Duration/Target Date for Goal Attainment 07/19/25   OT Goals Lower Body Dressing;Bed Mobility;Toilet Transfer/Toileting   OT: Lower Body Dressing Minimal assist;Goal Met   OT: Bed Mobility Supervision/stand-by assist;Goal Met  (verbal cues)   OT: Toilet Transfer/Toileting Supervision/stand-by assist;Goal Met  (verbal cues)   Interventions   Interventions Quick Adds Self-Care/Home Management   Self-Care/Home Management   Self-Care/Home Mgmt/ADL, Compensatory, Meal Prep Minutes (41206) 40   Treatment Detail/Skilled Intervention Pt in bed upon arrival with daughter present in room, edu on compensatory strategies for LE dressing - pt completed w/SBA given verbal cues for tech and problem solving. Used reacher to doff sock and Max A to don, daughter indicated her or her mom will complete for pt. Ed pt and daughter for proper positioning and appropriate height for LE dressing, after instruction. Increased time for pt to follow directions, pt frequently needing redirection to the task he was working as he would often job ahead to the next step. Cues for pt to look and listen to cues, as if he was looking at something else he was focusing on what he was looking, cristopher indicated awareness of this situaion as well. Initial education and verbal cues throughout session for walker . STS w/ SBA and amb. ~15 ft to BR w/ FWW, pain w/ mobility. Pt edu on safety technique for toilet attempted with set up like home of using one grab bar, pt continued to use B grab bars-educated daughter to purchase grab bars or a commode if they do not have one yet.  Ed pt on side stepping and shower chair transfer tech for WIS- pt completed shower chair transfer w/vc and Min A to stabilize FWW for sit>stand tranfser. Ed daughter to practice at home with their set upf of chair with a back, daughter verbalized understanding. Pt instructed on bed mobility techniques - completed Mod I given verbal cues and leg  .  Pt and daughter edu on sleeping position and car transfers - pt  and daughter verbalized understanding.   OT Discharge Planning   OT Plan OT d/c   OT Discharge Recommendation (DC Rec) other (see comments)  (Defer to ortho)   OT Rationale for DC Rec Pt has good family support that is aware of safe ortho tech and can remind pt to complete tech properly. Pt is SBA with vc for safety with fxl/mob and ADLs. Pt needs 24 hour supervision for verbal cues for all ADLs   OT Brief overview of current status SBA and verbal cues for all ADLs and fxl mob   OT Total Distance Amb During Session (feet) 30   OT Equipment Needed at Discharge   (possibly commode over toilet)   Total Session Time   Timed Code Treatment Minutes 40   Total Session Time (sum of timed and untimed services) 50   [facility]                                                                                   OUTPATIENT OCCUPATIONAL THERAPY    PLAN OF TREATMENT FOR OUTPATIENT REHABILITATION   Patient's Last Name, First Name, Rasheed Miranda YOB: 1945   Provider's Name   [facility]   Medical Record No.  [id number]     Onset Date: 07/18/25 Start of Care Date: (P) 07/19/25     Medical Diagnosis:  (P) R TKA               OT Diagnosis:  decreased ADLs Certification Dates:  From: (P) 07/19/25  To: (P) 07/19/25     See note for plan of treatment, functional goals, and certification details.    I CERTIFY THE NEED FOR THESE SERVICES FURNISHED UNDER        THIS PLAN OF TREATMENT AND WHILE UNDER MY CARE (Physician co-signature of this document indicates review and certification of the therapy plan).                           Occupational Therapy Discharge Summary    Reason for therapy discharge:    All goals and outcomes met, no further needs identified.    Progress towards therapy goal(s). See goals on Care Plan in [facility] electronic health record for goal details.  Goals  met    Therapy recommendation(s):    No further therapy is recommended.Pt needs 24 hour supervision for safety and verbal cues with ADLs and fxl mob.

## 2025-07-19 NOTE — PROGRESS NOTES
"Orthopedic Progress Note      Assessment: POD #1 right total knee arthroplasty    Plan:   - Continue PT/OT  - Weightbearing status: WBAT  - DVT prophylaxis: aspirin 81 mg BID for 30 days  - hgb 12.8 today. Ranges in the 14s at baseline. No indication for transfusion today.  - Bradycardia - sinus balbir with 1st degree AV block new since 7/10/25, labs pending  - discharge planning: home pending medical clearance. Stable to discharge from ortho perspective.       Subjective:  Patient reports pain is well-managed with oral medications.  Reports some shortness of breath at rest.  Denies any new numbness or tingling in the right lower extremity.  Denies any nausea or vomiting.  Denies chest pain at this time.  Able to void spontaneously and passing gas.      Objective:  /56 (BP Location: Left arm, Patient Position: Semi-Power's, Cuff Size: Adult Regular)   Pulse (!) 48   Temp 97.8  F (36.6  C) (Oral)   Resp 20   Ht 1.676 m (5' 6\")   Wt 74.8 kg (165 lb)   SpO2 98%   BMI 26.63 kg/m    Patient is alert and awake lying in bed.  No acute distress.    Focused exam of the right lower extremity reveals dressing is clean dry and intact.  Sensation is intact.  Dorsiflexion and plantar flexion is intact.  Dorsalis pedis pulse intact. Skin warm and well perfused.   Compartments are soft and non-tender.     No drain     Pertinent Labs   Lab Results: personally reviewed.   No results found for: \"INR\", \"PROTIME\"  Lab Results   Component Value Date    WBC 8.2 07/09/2025    HGB 12.8 (L) 07/19/2025    HCT 41.4 07/09/2025    MCV 96 07/19/2025     07/09/2025     Lab Results   Component Value Date     07/19/2025    CO2 24 07/19/2025         Lala Rodgers PA-C  [facility]         "

## 2025-07-19 NOTE — PROGRESS NOTES
[facility]    Medicine Progress Note - Hospitalist Service    Date of Admission:  7/18/2025    Assessment & Plan   Rasheed Hanson is a 80 year old male admitted on 7/18/2025. He has a past medical history of bronchitis with bronchospasms that has been stable, hypothyroidism, chronic kidney disease stage IIIa, who is here for orthopedic surgery.    The preop visit with Dr. Yates and also Provider Derrick harper are reviewed. Preop labs include 7/9/2025 sodium 148, potassium 4.1, creatinine 1.18 with a previous baseline of 1.1-1.2, WBC 8.2, hemoglobin 14, platelets 231. Home medications reviewed.      S/p RIGHT TKA on 7/18/2025  by Dr. Gage bowers/ MARCIE of 50ccs. Internal Medicine is consulted for medication co-management. Home medications are reconciled from our standpoint.  Seen on pacu11. Vitally stable on RA.     Asymptomatic bradycardia overnight, EKG with 1st degree AV Block, noted, monitor clinically. Would be more concerning if 2nd (Mobitz II, not mobitz 1 wenceback) or 3rd degree. Updated pt and his RN daughter.    On 7/19/2025, stable bmp, hgb 12.8, and pt remains hemodynamically and vitally stable and cleared from the internal medicine perspective, the final disposition is pending therapies clearance and per surgery/primary team. Medications are reconciled for discharge from our standpoint. Pt/daughter (who is an RN) wanted to discuss timing of discharge so I referred them to the Charge RN to connect with the primary team/orthopedics..    -----  S/p right total knee Arthroplasty  A- stable, on RA  P:  -DVT prophylaxis and pain management per primary service  -We will monitor for complications including respiratory concerns, urinary retention, ileus, skin reactions, infections, medication side effects, etc.  -Incentive spirometry   -discharge disposition per primary service    Asymptomatic bradycardia   A/p-overnight, EKG with 1st degree AV Block, noted, monitor clinically.   -Would be  "more concerning if 2nd (Mobitz II, not mobitz 1 wenceback) or 3rd degree.   -Updated pt and his RN daughter.    bronchitis with bronchospasms   A- stable on RA. His hx of this has been stable. On RA  P:  - continue home prn medications    Hypothyroidism  A/p- continue thyroid medication    chronic kidney disease stage IIIa  A- preop was cr 1.18, stable within baseline range of 1.1-1.2. recheck stable/improving at 1.15  P:  - avoid nephrotoxic agents  - mivfs per primary, wean off when PO intake normalizes -> now off    Prior history of malignant tumor of prostate  A/P-follows with urology  - Monitor clinically for any signs or symptoms of urinary problems or retention              Diet: Advance Diet as Tolerated: Regular Diet Adult  Discharge Instruction - Regular Diet Adult    DVT Prophylaxis: Defer to primary service  Chandra Catheter: Not present  Lines: None     Cardiac Monitoring: None  Code Status: Full Code      Clinically Significant Risk Factors Present on Admission                             # Overweight: Estimated body mass index is 26.63 kg/m  as calculated from the following:    Height as of this encounter: 1.676 m (5' 6\").    Weight as of this encounter: 74.8 kg (165 lb).              Social Drivers of Health    Stress: Stress Concern Present (12/26/2024)    [employer] - Occupational Stress Questionnaire     Feeling of Stress : To some extent   Social Connections: Unknown (12/26/2024)    Social Connection and Isolation Panel [NHANES]     Frequency of Social Gatherings with Friends and Family: Twice a week          Disposition Plan      Medically Ready for Discharge: Anticipated Today             Dhiraj Cotto MD  Hospitalist Service  [facility]  Securely message with [employer] (more info)  Text page via [employer] Paging/Directory   ______________________________________________________________________    Interval History   -Asymptomatic bradycardia " overnight, EKG with 1st degree AV Block  - discussed would be more concerning if 2nd (Mobitz II, not mobitz 1 ilan) or 3rd degree. Updated pt and his RN daughter.  -Pt/daughter (who is an RN) wanted to discuss timing of discharge so I referred them to the Charge RN to connect with the primary team/orthopedics..  -no acute interval events  -seen in early morning, no new symptoms or shortness of breath.  -We discussed disposition per primary team  -passing gas and actually 2 BM already  -discussed w/ Charge to connect w/ ortho and bedside RN  -All questions answered      Physical Exam   Vital Signs: Temp: 97.8  F (36.6  C) Temp src: Oral BP: 116/56 Pulse: (!) 48   Resp: 20 SpO2: 98 % O2 Device: None (Room air) Oxygen Delivery: 6 LPM  Weight: 165 lbs 0 oz    General: alert, oriented, and in no acute distress  Pulmonary: clear to auscultation bilaterally, normal respiratory effort, on room air, no rales or wheezes or evidence of accessory muscle use  CVS: regular rate and rhythm, no murmurs, rubs, or gallops; no blatant jugular venous distention; no extremity edema and extremities are warm to the touch  GI: soft, nontender, BS+, no rebound or guarding, no conspicuous organomegaly   Neuro: nonfocal, moves all extremities of own volition  Psych: appropriate   Msk- stable, right lower limb dressing stable c/d/I, elsewhere wnl     Medical Decision Making       49 MINUTES SPENT BY ME on the date of service doing chart review, history, exam, documentation & further activities per the note.      Data   ------------------------- PAST 24 HR DATA REVIEWED -----------------------------------------------    I have personally reviewed the following data over the past 24 hrs:    N/A  \   12.8 (L)   / N/A     138 105 18.3 /  123 (H)   4.3 24 1.15 \     TSH: 1.58 T4: N/A A1C: N/A       Imaging results reviewed over the past 24 hrs:   Recent Results (from the past 24 hours)   XR Knee Port Right 1/2 Views    Narrative    EXAM: XR  KNEE PORT RIGHT 1/2 VIEWS  LOCATION: [facility]  DATE: 7/18/2025    INDICATION: Post Op Total Knee  COMPARISON: 5/16/2025      Impression    IMPRESSION: Status post recent right total knee arthroplasty. No immediate hardware complication. Expected postsurgical soft tissue swelling, subcutaneous emphysema, and gas containing joint effusion. No acute fracture or malalignment.

## 2025-07-19 NOTE — PROGRESS NOTES
"PRIMARY DIAGNOSIS: \"GENERIC\" NURSING  OUTPATIENT/OBSERVATION GOALS TO BE MET BEFORE DISCHARGE:  ADLs back to baseline: No    Activity and level of assistance: Up with standby assistance.    Pain status: Improved-controlled with oral pain medications.    Return to near baseline physical activity: No     Discharge Planner Nurse   Safe discharge environment identified: Yes  Barriers to discharge: Yes       Entered by: Amanda Ling RN 07/19/2025 4:27 AM     Please review provider order for any additional goals.   Nurse to notify provider when observation goals have been met and patient is ready for discharge.  "

## 2025-07-19 NOTE — PROGRESS NOTES
Notified MD at 0410 AM regarding HR in 40s    Spoke with: Dr Rodriguez    Orders were obtained. EKG and labs    Comments: Patient denies pain and SOB

## 2025-07-20 VITALS
HEART RATE: 84 BPM | TEMPERATURE: 98.2 F | BODY MASS INDEX: 26.52 KG/M2 | RESPIRATION RATE: 18 BRPM | OXYGEN SATURATION: 97 % | SYSTOLIC BLOOD PRESSURE: 148 MMHG | HEIGHT: 66 IN | WEIGHT: 165 LBS | DIASTOLIC BLOOD PRESSURE: 73 MMHG

## 2025-07-20 LAB
FASTING STATUS PATIENT QL REPORTED: NO
GLUCOSE SERPL-MCNC: 114 MG/DL (ref 70–99)
HGB BLD-MCNC: 12.5 G/DL (ref 13.3–17.7)
MCV RBC AUTO: 96 FL (ref 78–100)

## 2025-07-20 PROCEDURE — 250N000013 HC RX MED GY IP 250 OP 250 PS 637: Performed by: FAMILY MEDICINE

## 2025-07-20 PROCEDURE — 99233 SBSQ HOSP IP/OBS HIGH 50: CPT | Performed by: STUDENT IN AN ORGANIZED HEALTH CARE EDUCATION/TRAINING PROGRAM

## 2025-07-20 PROCEDURE — 36415 COLL VENOUS BLD VENIPUNCTURE: CPT | Performed by: ORTHOPAEDIC SURGERY

## 2025-07-20 PROCEDURE — 250N000013 HC RX MED GY IP 250 OP 250 PS 637: Performed by: STUDENT IN AN ORGANIZED HEALTH CARE EDUCATION/TRAINING PROGRAM

## 2025-07-20 PROCEDURE — 93010 ELECTROCARDIOGRAM REPORT: CPT | Performed by: INTERNAL MEDICINE

## 2025-07-20 PROCEDURE — 250N000013 HC RX MED GY IP 250 OP 250 PS 637: Performed by: ORTHOPAEDIC SURGERY

## 2025-07-20 PROCEDURE — 82947 ASSAY GLUCOSE BLOOD QUANT: CPT | Performed by: ORTHOPAEDIC SURGERY

## 2025-07-20 PROCEDURE — 85018 HEMOGLOBIN: CPT | Performed by: ORTHOPAEDIC SURGERY

## 2025-07-20 PROCEDURE — 93005 ELECTROCARDIOGRAM TRACING: CPT

## 2025-07-20 RX ORDER — SENNOSIDES 8.6 MG
325 CAPSULE ORAL DAILY
Qty: 30 TABLET | Refills: 0 | Status: SHIPPED | OUTPATIENT
Start: 2025-07-20

## 2025-07-20 RX ORDER — AMOXICILLIN 250 MG
1-2 CAPSULE ORAL 2 TIMES DAILY
Qty: 30 TABLET | Refills: 0 | Status: SHIPPED | OUTPATIENT
Start: 2025-07-20

## 2025-07-20 RX ADMIN — CALCIUM CARBONATE (ANTACID) CHEW TAB 500 MG 500 MG: 500 CHEW TAB at 00:39

## 2025-07-20 RX ADMIN — SENNOSIDES AND DOCUSATE SODIUM 1 TABLET: 50; 8.6 TABLET ORAL at 07:50

## 2025-07-20 RX ADMIN — OXYCODONE HYDROCHLORIDE 5 MG: 5 TABLET ORAL at 04:40

## 2025-07-20 RX ADMIN — OXYCODONE HYDROCHLORIDE 5 MG: 5 TABLET ORAL at 08:48

## 2025-07-20 RX ADMIN — OXYCODONE HYDROCHLORIDE 10 MG: 5 TABLET ORAL at 00:36

## 2025-07-20 RX ADMIN — METHOCARBAMOL TABLETS 250 MG: 500 TABLET, COATED ORAL at 06:52

## 2025-07-20 RX ADMIN — OXYCODONE HYDROCHLORIDE 5 MG: 5 TABLET ORAL at 14:22

## 2025-07-20 RX ADMIN — HYDROXYZINE HYDROCHLORIDE 10 MG: 10 TABLET, FILM COATED ORAL at 06:52

## 2025-07-20 RX ADMIN — ALLOPURINOL 100 MG: 100 TABLET ORAL at 07:50

## 2025-07-20 RX ADMIN — POLYETHYLENE GLYCOL 3350 17 G: 17 POWDER, FOR SOLUTION ORAL at 07:50

## 2025-07-20 RX ADMIN — LEVOTHYROXINE, LIOTHYRONINE 30 MG: 19; 4.5 TABLET ORAL at 06:52

## 2025-07-20 RX ADMIN — ASPIRIN 81 MG: 81 TABLET, COATED ORAL at 07:50

## 2025-07-20 ASSESSMENT — ACTIVITIES OF DAILY LIVING (ADL)
ADLS_ACUITY_SCORE: 45
ADLS_ACUITY_SCORE: 46
ADLS_ACUITY_SCORE: 36
ADLS_ACUITY_SCORE: 45
ADLS_ACUITY_SCORE: 46
ADLS_ACUITY_SCORE: 45
ADLS_ACUITY_SCORE: 45
ADLS_ACUITY_SCORE: 42
ADLS_ACUITY_SCORE: 36
ADLS_ACUITY_SCORE: 36
ADLS_ACUITY_SCORE: 45
ADLS_ACUITY_SCORE: 45

## 2025-07-20 NOTE — DISCHARGE SUMMARY
"Orthopedic Discharge Summary    Rasheed Hanson,  1945, MRN [id number]    Admission Date: 2025  Admission Diagnoses: Osteoarthritis of right knee [M17.11]     Discharge Date:      Post-operative Day:  2 Days Post-Op    Reason for Admission: The patient was admitted for the following:  Right knee replacement    Brief Hospital Course: This 80 year old male underwent the aforementioned procedure with Dr. Albrecht on . There were no intraoperative complications and the patient was transferred to the recovery room and later the orthopedic unit in stable condition. Once the patient reached the orthopedic floor our orthopedic pain protocol was implemented along with the following:  Therapy: PT  Anticoagulation Medications: ASA 81mg BID     Complications during admission: None  Consultations during admission: Hospitalist service for medical management     Pertinent Results at Discharge:    Hemoglobin   Date/Time Value Ref Range Status   2025 06:01 AM 12.5 (L) 13.3 - 17.7 g/dL Final   2025 04:38 AM 12.8 (L) 13.3 - 17.7 g/dL Final   2025 09:34 AM 14.0 13.3 - 17.7 g/dL Final     Platelet Count   Date/Time Value Ref Range Status   2025 09:34  150 - 450 10e3/uL Final   2024 10:00  150 - 450 10e3/uL Final   2023 09:41  150 - 450 10e3/uL Final     BP (!) 148/73 (BP Location: Left arm, Cuff Size: Adult Regular)   Pulse 84   Temp 98.2  F (36.8  C) (Oral)   Resp 18   Ht 1.676 m (5' 6\")   Wt 74.8 kg (165 lb)   SpO2 97%   BMI 26.63 kg/m      Active Problems:    Status post joint replacement      Discharge Information:  Condition at discharge: good  Discharge destination: [unfilled]    Medications at discharge:      Medication List        Started      aspirin 325 MG EC tablet  Commonly known as: ASA  325 mg, Oral, DAILY     oxyCODONE 5 MG tablet  Commonly known as: ROXICODONE  5 mg, Oral, EVERY 6 HOURS PRN     senna-docusate 8.6-50 MG tablet  Commonly " known as: SENOKOT-S/PERICOLACE  1-2 tablets, Oral, 2 TIMES DAILY, Take while on oral narcotics to prevent or treat constipation.              Activity: WBAT    Follow-up Care:  The patient will be followed in our office in 2 weeks or sooner should the need arise.  Patient should follow up with their PCP as directed.  Patient was seen by myself on the date of discharge.    Nishant Kelley MD    Date: 7/20/2025  Time: 9:53 AM

## 2025-07-20 NOTE — PLAN OF CARE
Problem: Adult Inpatient Plan of Care  Goal: Optimal Comfort and Wellbeing  Intervention: Monitor Pain and Promote Comfort  Recent Flowsheet Documentation  Taken 7/18/2025 1415 by Harrison Wall RN  Pain Management Interventions:   distraction   cold applied     Goal Outcome Evaluation:  Pt is AOX4, able to make needs known. Pt is up with aX1 GB and walker due to knee pain. Pain controlled with scheduled and PRN meds per MAR. Pt is continent of B & B, has urinal bedside, has had a BM this shift. On RA, no c/o SOB, N/V, CP. Pt has multiple ice packs on knee area, top and bottom. Regular diet. Pt has walked this shift X3 @30 feet each time.    Care hours: [id number]                            
  Problem: Delirium  Goal: Improved Behavioral Control  Intervention: Minimize Safety Risk  Recent Flowsheet Documentation  Taken 7/19/2025 0821 by Harrison Wall RN  Trust Relationship/Rapport:   care explained   questions answered     Problem: Delirium  Goal: Improved Attention and Thought Clarity  Intervention: Maximize Cognitive Function  Recent Flowsheet Documentation  Taken 7/19/2025 0821 by Harrison Wall, RN  Sensory Stimulation Regulation:   quiet environment promoted   lighting decreased   care clustered  Reorientation Measures:   calendar in view   clock in view     Problem: Adult Inpatient Plan of Care  Goal: Optimal Comfort and Wellbeing  Intervention: Monitor Pain and Promote Comfort  Recent Flowsheet Documentation  Taken 7/19/2025 0915 by Harrison Wall, RN  Pain Management Interventions: medication (see MAR)  Taken 7/19/2025 0821 by Harrison Wall RN  Pain Management Interventions: medication (see MAR)     Goal Outcome Evaluation:  Pt is AOX4, able to make needs known. Pt is very impulsive when up with his walker and GB. Pt attempting to grab at things on the floor/closet. Pt did not fall in room, but did scrape his arm on the closet reaching for a bag. Pt is continent of B & B, uses a toilet appropriately, needs verbal cues to remember the steps of getting up and down safely. Pt is vitally stable during this shift. Ongpoing care was explained to family upon discharge, family is aware of pts physical ability, its the impulsiveness the family is concerned with. Pain has been well controlled with PRN and scheduled meds per MAR.     Care hours: [id number]                            
Goal Outcome Evaluation:       Patient alert and oriented x4, pain controlled with scheduled meds. EKG and labs ordered for noted HR at 40s, EKG shows balbir 1st degree AV block. Uses bedside commode, no BM overnight. PIV on lower forearm SL.  Vital signs:  Temp: 97.8  F (36.6  C) Temp src: Oral BP: 116/56 Pulse: (!) 48   Resp: 20 SpO2: 98 % O2 Device: None (Room air), continue to monitor.               
Goal Outcome Evaluation:  Pt is physically and medically cleared to discharge to home with family. Pt is AOX4, able to make needs known. Pt still needs cues for how to prevent injury.                            
Patient A&O x4, VSS    Vitals: Temp: 97.8  F (36.6  C) Temp src: Oral BP: (!) 151/70 Pulse: 87   Resp: 18 SpO2: 97 % O2 Device: None (Room air)       Diet: Orders Placed This Encounter      Advance Diet as Tolerated: Regular Diet Adult      Discharge Instruction - Regular Diet Adult     Lines/Drains: Lt PIV - SL    Protocols: N/A    Tele: N/A    Pt denies any n/v, SOB, or CP    Pain: 10/10 PRNs used: yes, see MAR    Mobility: SBA w/ GBW    Care ongoing, call light within reach, bed in low position, alarms on for safety.    Discharge Plan: TBD - home vs TCU    HLM Admission: 7- Walk 25 feet or more  HLM Daily7-Walk 25 feet or more          
Patient vital signs are at baseline: Yes  Patient able to ambulate as they were prior to admission or with assist devices provided by therapies during their stay:  Yes  Patient MUST void prior to discharge:  Yes  Patient able to tolerate oral intake:  Yes  Pain has adequate pain control using Oral analgesics:  Yes  Does patient have an identified :  Yes  Has goal D/C date and time been discussed with patient:  Yes     Goal Outcome Evaluation:    Problem: Delirium  Goal: Improved Attention and Thought Clarity  Outcome: Progressing     Problem: Adult Inpatient Plan of Care  Goal: Optimal Comfort and Wellbeing  Outcome: Progressing     Problem: Knee Arthroplasty  Goal: Optimal Coping  Outcome: Progressing     Problem: Knee Arthroplasty  Goal: Absence of Bleeding  Outcome: Progressing     Problem: Knee Arthroplasty  Goal: Optimal Functional Ability  Outcome: Progressing  Intervention: Promote Optimal Functional Status  Recent Flowsheet Documentation  Taken 7/18/2025 2020 by Vanessa Torres, RN  Activity Management: activity adjusted per tolerance     Problem: Knee Arthroplasty  Goal: Absence of Infection Signs and Symptoms  Outcome: Progressing     
Physical Therapy Discharge Summary    Reason for therapy discharge:    All goals and outcomes met, no further needs identified.    Progress towards therapy goal(s). See goals on Care Plan in [facility] electronic health record for goal details.  Goals met    Therapy recommendation(s):    Continued therapy is recommended.  Rationale/Recommendations:  continued PT with outpatient PT to improve functional mobility.  Continue home exercise program.      
yes

## 2025-07-20 NOTE — PROGRESS NOTES
"Pt has been anxious and complaining of right knee pain. Pt reported feeling in \"shock\" and hot. Pt refused to removed the bare hugger, oral temp 98.5F. managed with MAR and emotional support. Pt is redirectable. Pt talks to himself frequently but is a/ox4.   Continue pain management.  "

## 2025-07-20 NOTE — PROGRESS NOTES
[facility]    Medicine Progress Note - Hospitalist Service    Date of Admission:  7/18/2025    Assessment & Plan   Rasheed Hanson is a 80 year old male admitted on 7/18/2025. He has a past medical history of bronchitis with bronchospasms that has been stable, hypothyroidism, chronic kidney disease stage IIIa, who is here for orthopedic surgery.    The preop visit with Dr. Yates and also Provider Derrick harper are reviewed. Preop labs include 7/9/2025 sodium 148, potassium 4.1, creatinine 1.18 with a previous baseline of 1.1-1.2, WBC 8.2, hemoglobin 14, platelets 231. Home medications reviewed.      S/p RIGHT TKA on 7/18/2025  by Dr. Gage bowers/ MARCIE of 50ccs. Internal Medicine is consulted for medication co-management. Home medications are reconciled from our standpoint.  Seen on pacu11. Vitally stable on RA.     Asymptomatic bradycardia overnight, EKG with 1st degree AV Block, noted, monitor clinically. Would be more concerning if 2nd (Mobitz II, not mobitz 1 wenceback) or 3rd degree. Updated pt and his RN daughter.    On 7/19/2025, stable bmp, hgb 12.8, and pt remains hemodynamically and vitally stable and cleared from the internal medicine perspective, the final disposition is pending therapies clearance and per surgery/primary team. Medications are reconciled for discharge from our standpoint. Pt/daughter (who is an RN) wanted to discuss timing of discharge so I referred them to the Charge RN to connect with the primary team/orthopedics.    Overnight pain was labile. No fevers (highest temp 98.5) or new sx overnight. However on rounds he was a bit sweaty and tachycardic; will give a bolus and check EKG- no new symptoms. If HR improves and reassuring non-acute EKG then cleared medically.     -----  S/p right total knee Arthroplasty  A- stable, on RA  P:  -DVT prophylaxis and pain management per primary service  -We will monitor for complications including respiratory concerns, urinary  "retention, ileus, skin reactions, infections, medication side effects, etc.  -Incentive spirometry   -discharge disposition per primary service    On 7/20, tachycardic, sweaty  Asymptomatic bradycardia   A/p-overnight, EKG with 1st degree AV Block, noted, monitor clinically. However he was a bit sweaty and tachycardic on rounds 7/20/2025 - no palpitations or symptoms though. His prior EKG had type 1 AVB which warrants monitoring.   -give a bolus 500cc x1 and check EKG  -regarding bradycardia- Would be more concerning if 2nd (Mobitz II, not mobitz 1 wenceback) or 3rd degree.   -Updated pt and his RN daughter.    bronchitis with bronchospasms   A- stable on RA. His hx of this has been stable. On RA  P:  - continue home prn medications    Hypothyroidism  A/p- continue thyroid medication    chronic kidney disease stage IIIa  A- preop was cr 1.18, stable within baseline range of 1.1-1.2. recheck stable/improving at 1.15  P:  - avoid nephrotoxic agents  - mivfs per primary, wean off when PO intake normalizes -> now off    Prior history of malignant tumor of prostate  A/P-follows with urology  - Monitor clinically for any signs or symptoms of urinary problems or retention              Diet: Advance Diet as Tolerated: Regular Diet Adult  Discharge Instruction - Regular Diet Adult    DVT Prophylaxis: Defer to primary service  Chandra Catheter: Not present  Lines: None     Cardiac Monitoring: None  Code Status: Full Code      Clinically Significant Risk Factors Present on Admission                             # Overweight: Estimated body mass index is 26.63 kg/m  as calculated from the following:    Height as of this encounter: 1.676 m (5' 6\").    Weight as of this encounter: 74.8 kg (165 lb).              Social Drivers of Health    Stress: Stress Concern Present (12/26/2024)    [employer] - Occupational Stress Questionnaire     Feeling of Stress : To some extent   Social Connections: Unknown " (12/26/2024)    Social Connection and Isolation Panel [NHANES]     Frequency of Social Gatherings with Friends and Family: Twice a week          Disposition Plan      Medically Ready for Discharge: Anticipated Today             Dhiraj Cotto MD  Hospitalist Service  [facility]  Securely message with [employer] (more info)  Text page via [employer] Paging/Directory   ______________________________________________________________________    Interval History   -see summary about family/pt and dispo yesterday  - naeo  - however when seen in early morning, he was a bit sweaty and tachycardic; discussed we yady give a bolus and check EKG and answered all his questions  -no new symptoms or shortness of breath or palpitations   -We discussed disposition per primary team as usual   -passing gas still, passed BM yesterday  -discussed w/ Charge and to update RN who was in another room  -All questions answered     Physical Exam   Vital Signs: Temp: 98.2  F (36.8  C) Temp src: Oral BP: (!) 148/73 Pulse: 84   Resp: 18 SpO2: 97 % O2 Device: None (Room air)    Weight: 165 lbs 0 oz    General: alert, oriented, and in no acute distress  Pulmonary: clear to auscultation bilaterally, normal respiratory effort, on room air, no rales or wheezes or evidence of accessory muscle use  CVS: borderline normal and mildly tachycardic, no murmurs, rubs, or gallops; no blatant jugular venous distention; no extremity edema and extremities are warm to the touch  GI: soft, nontender, BS+, no rebound or guarding, no conspicuous organomegaly   Neuro: nonfocal, moves all extremities of own volition  Psych: appropriate   Msk- stable, right lower limb dressing stable c/d/I, elsewhere wnl     Medical Decision Making       45 MINUTES SPENT BY ME on the date of service doing chart review, history, exam, documentation & further activities per the note.      Data   ------------------------- PAST 24 HR DATA REVIEWED  -----------------------------------------------    I have personally reviewed the following data over the past 24 hrs:    N/A  \   12.5 (L)   / N/A     N/A N/A N/A /  N/A   N/A N/A N/A \       Imaging results reviewed over the past 24 hrs:   No results found for this or any previous visit (from the past 24 hours).

## 2025-07-21 LAB
ATRIAL RATE - MUSE: 100 BPM
ATRIAL RATE - MUSE: 47 BPM
DIASTOLIC BLOOD PRESSURE - MUSE: NORMAL MMHG
DIASTOLIC BLOOD PRESSURE - MUSE: NORMAL MMHG
INTERPRETATION ECG - MUSE: NORMAL
INTERPRETATION ECG - MUSE: NORMAL
P AXIS - MUSE: 33 DEGREES
P AXIS - MUSE: 53 DEGREES
PR INTERVAL - MUSE: 174 MS
PR INTERVAL - MUSE: 224 MS
QRS DURATION - MUSE: 74 MS
QRS DURATION - MUSE: 86 MS
QT - MUSE: 326 MS
QT - MUSE: 448 MS
QTC - MUSE: 396 MS
QTC - MUSE: 420 MS
R AXIS - MUSE: -10 DEGREES
R AXIS - MUSE: -9 DEGREES
SYSTOLIC BLOOD PRESSURE - MUSE: NORMAL MMHG
SYSTOLIC BLOOD PRESSURE - MUSE: NORMAL MMHG
T AXIS - MUSE: -21 DEGREES
T AXIS - MUSE: -7 DEGREES
VENTRICULAR RATE- MUSE: 100 BPM
VENTRICULAR RATE- MUSE: 47 BPM

## 2025-08-01 ENCOUNTER — RESULTS FOLLOW-UP (OUTPATIENT)
Dept: FAMILY MEDICINE | Facility: CLINIC | Age: 80
End: 2025-08-01
Payer: COMMERCIAL

## 2025-08-04 ENCOUNTER — TRANSFERRED RECORDS (OUTPATIENT)
Dept: HEALTH INFORMATION MANAGEMENT | Facility: CLINIC | Age: 80
End: 2025-08-04
Payer: COMMERCIAL

## 2025-08-19 ENCOUNTER — MYC MEDICAL ADVICE (OUTPATIENT)
Dept: FAMILY MEDICINE | Facility: CLINIC | Age: 80
End: 2025-08-19
Payer: COMMERCIAL

## 2025-08-19 DIAGNOSIS — M25.561 CHRONIC PAIN OF RIGHT KNEE: Primary | ICD-10-CM

## 2025-08-19 DIAGNOSIS — G89.29 CHRONIC PAIN OF RIGHT KNEE: Primary | ICD-10-CM

## 2025-08-19 RX ORDER — CELECOXIB 100 MG/1
100 CAPSULE ORAL 2 TIMES DAILY PRN
Qty: 60 CAPSULE | Refills: 3 | Status: SHIPPED | OUTPATIENT
Start: 2025-08-19

## (undated) DEVICE — GOWN IMPERVIOUS BREATHABLE SMART XLG 89045

## (undated) DEVICE — SU ETHIBOND 5 V-37 4X30" MB66G

## (undated) DEVICE — SUTURE MONOCRYL 3-0 18 PS2 UND MCP497G

## (undated) DEVICE — PREP CHLORAPREP W/ORANGE TINT 10.5ML 930715

## (undated) DEVICE — HOOD SURG T7PLUS PEEL AWAY FACE SHIELD STRL LF 0416-801-100

## (undated) DEVICE — GLOVE BIOGEL PI INDICATOR 8.0 LF 41680

## (undated) DEVICE — SU STRATAFIX PDS PLUS 1 CT-1 18" SXPP1A404

## (undated) DEVICE — HOOD SURG T7 PLUS STRL LF DISP 0416-801-200

## (undated) DEVICE — CUFF TOURN 30IN STRL DISP 5921030235

## (undated) DEVICE — SUTURE VICRYL+ 2-0 27IN CT-1 UND VCP259H

## (undated) DEVICE — CUSTOM PACK TOTAL KNEE ACCESSORY SOP5BTAHEA

## (undated) DEVICE — SU DERMABOND ADVANCED .7ML DNX12

## (undated) DEVICE — SUCTION MANIFOLD NEPTUNE 2 SYS 4 PORT 0702-020-000

## (undated) DEVICE — ELECTRODE PATIENT RETURN ADULT L10 FT 2 PLATE CORD 0855C

## (undated) DEVICE — GLOVE BIOGEL PI ORTHOPRO SZ 7.5 47675

## (undated) DEVICE — BLADE SAGITTAL 25MM X 90MM X 1.19MM 6125-119-090

## (undated) DEVICE — SOLUTION WATER 1000ML BOTTLE R5000-01

## (undated) DEVICE — DRSG FOAM MEPILEX BORDER SILVER 4X10 POSTOP 498450

## (undated) DEVICE — SOLUTION IRRIGATION 0.9% NACL 1000ML BOTTLE R5200-01

## (undated) DEVICE — CUSTOM PACK TOTAL KNEE SOP5BTKHEC

## (undated) DEVICE — SU VICRYL+ 0 27IN CT-1 UND VCP260H

## (undated) DEVICE — HOLDER LIMB VELCRO OR 0814-1533

## (undated) DEVICE — SOLUTION IV 0.9% NACL 1000ML E8000

## (undated) RX ORDER — DEXAMETHASONE SODIUM PHOSPHATE 4 MG/ML
INJECTION, SOLUTION INTRA-ARTICULAR; INTRALESIONAL; INTRAMUSCULAR; INTRAVENOUS; SOFT TISSUE
Status: DISPENSED
Start: 2025-07-18

## (undated) RX ORDER — FENTANYL CITRATE-0.9 % NACL/PF 10 MCG/ML
PLASTIC BAG, INJECTION (ML) INTRAVENOUS
Status: DISPENSED
Start: 2025-07-18

## (undated) RX ORDER — PROPOFOL 10 MG/ML
INJECTION, EMULSION INTRAVENOUS
Status: DISPENSED
Start: 2025-07-18

## (undated) RX ORDER — LIDOCAINE HYDROCHLORIDE 10 MG/ML
INJECTION, SOLUTION EPIDURAL; INFILTRATION; INTRACAUDAL; PERINEURAL
Status: DISPENSED
Start: 2025-07-18

## (undated) RX ORDER — ONDANSETRON 2 MG/ML
INJECTION INTRAMUSCULAR; INTRAVENOUS
Status: DISPENSED
Start: 2025-07-18